# Patient Record
Sex: FEMALE | Race: WHITE | NOT HISPANIC OR LATINO | Employment: OTHER | URBAN - METROPOLITAN AREA
[De-identification: names, ages, dates, MRNs, and addresses within clinical notes are randomized per-mention and may not be internally consistent; named-entity substitution may affect disease eponyms.]

---

## 2017-01-31 ENCOUNTER — LAB (OUTPATIENT)
Dept: LAB | Facility: HOSPITAL | Age: 72
End: 2017-01-31

## 2017-01-31 ENCOUNTER — OFFICE VISIT (OUTPATIENT)
Dept: CARDIOLOGY | Facility: CLINIC | Age: 72
End: 2017-01-31

## 2017-01-31 VITALS
WEIGHT: 293 LBS | SYSTOLIC BLOOD PRESSURE: 132 MMHG | HEART RATE: 101 BPM | DIASTOLIC BLOOD PRESSURE: 90 MMHG | BODY MASS INDEX: 48.82 KG/M2 | HEIGHT: 65 IN

## 2017-01-31 DIAGNOSIS — I48.19 PERSISTENT ATRIAL FIBRILLATION (HCC): Primary | ICD-10-CM

## 2017-01-31 DIAGNOSIS — I48.19 PERSISTENT ATRIAL FIBRILLATION (HCC): ICD-10-CM

## 2017-01-31 LAB
ANION GAP SERPL CALCULATED.3IONS-SCNC: 16.2 MMOL/L
BUN BLD-MCNC: 17 MG/DL (ref 8–23)
BUN/CREAT SERPL: 17.2 (ref 7–25)
CALCIUM SPEC-SCNC: 9 MG/DL (ref 8.6–10.5)
CHLORIDE SERPL-SCNC: 101 MMOL/L (ref 98–107)
CO2 SERPL-SCNC: 23.8 MMOL/L (ref 22–29)
CREAT BLD-MCNC: 0.99 MG/DL (ref 0.57–1)
DEPRECATED RDW RBC AUTO: 47.7 FL (ref 37–54)
ERYTHROCYTE [DISTWIDTH] IN BLOOD BY AUTOMATED COUNT: 14.1 % (ref 11.7–13)
GFR SERPL CREATININE-BSD FRML MDRD: 55 ML/MIN/1.73
GLUCOSE BLD-MCNC: 87 MG/DL (ref 65–99)
HCT VFR BLD AUTO: 45.8 % (ref 35.6–45.5)
HGB BLD-MCNC: 14.6 G/DL (ref 11.9–15.5)
MCH RBC QN AUTO: 30 PG (ref 26.9–32)
MCHC RBC AUTO-ENTMCNC: 31.9 G/DL (ref 32.4–36.3)
MCV RBC AUTO: 94 FL (ref 80.5–98.2)
PLATELET # BLD AUTO: 270 10*3/MM3 (ref 140–500)
PMV BLD AUTO: 10.9 FL (ref 6–12)
POTASSIUM BLD-SCNC: 3.8 MMOL/L (ref 3.5–5.2)
RBC # BLD AUTO: 4.87 10*6/MM3 (ref 3.9–5.2)
SODIUM BLD-SCNC: 141 MMOL/L (ref 136–145)
WBC NRBC COR # BLD: 10.67 10*3/MM3 (ref 4.5–10.7)

## 2017-01-31 PROCEDURE — 36415 COLL VENOUS BLD VENIPUNCTURE: CPT

## 2017-01-31 PROCEDURE — 99214 OFFICE O/P EST MOD 30 MIN: CPT | Performed by: INTERNAL MEDICINE

## 2017-01-31 PROCEDURE — 80048 BASIC METABOLIC PNL TOTAL CA: CPT

## 2017-01-31 PROCEDURE — 93000 ELECTROCARDIOGRAM COMPLETE: CPT | Performed by: INTERNAL MEDICINE

## 2017-01-31 PROCEDURE — 85027 COMPLETE CBC AUTOMATED: CPT

## 2017-01-31 NOTE — PROGRESS NOTES
"Date of Office Visit: 2017  Encounter Provider: Moose Salcedo MD  Place of Service: [facility] CARDIOLOGY  Patient Name: Unique Talavera  : 1945    Subjective:   Encounter Date:2017    Chief Complaint: shortness of breath  History of Present Illness    Unique Talavera is a 71 y.o. female is a patient seen in follow up for heart failure. She presented initially with decompensated CHF and AF. She has been treated for systolic CHF (last EF 35%) with high dose BBs, she failed to tolerate ACE inhibitors with symptomatic hypotension. She feels that she is swelling more, but her dyspnea is unchanged. She is not checking regular weights. Unique Talavera denies CP, PND, and orthopnea.    Review of Systems: Per HPI otherwise 14 point ROS reviewed and negative.    Family History   Problem Relation Age of Onset   • Heart disease Mother    • Stroke Father        Social History     Social History   • Marital status:      Spouse name: N/A   • Number of children: N/A   • Years of education: N/A     Occupational History   • Not on file.     Social History Main Topics   • Smoking status: Former Smoker   • Smokeless tobacco: Not on file   • Alcohol use Yes      Comment: occassionally    • Drug use: No   • Sexual activity: Not on file     Other Topics Concern   • Not on file     Social History Narrative       Objective:     Vitals:    17 1323   BP: 132/90   Pulse: 101   Weight: 295 lb (134 kg)   Height: 65\" (165.1 cm)       Physical Exam   Constitutional: She is oriented to person, place, and time. She appears well-developed and well-nourished.   Eyes: Conjunctivae are normal. Pupils are equal, round, and reactive to light.   Neck: Neck supple. No JVD present. No thyromegaly present.   Cardiovascular: Normal rate and normal heart sounds.  An irregularly irregular rhythm present.   No murmur heard.  Pulmonary/Chest: Effort normal and breath sounds normal.   Abdominal: Soft. " Bowel sounds are normal. She exhibits no distension. There is no tenderness.   Musculoskeletal: She exhibits no edema.   Neurological: She is alert and oriented to person, place, and time.   Skin: Skin is warm. No rash noted. No erythema.   Psychiatric: She has a normal mood and affect. Her behavior is normal. Judgment normal.   Vitals reviewed.        ECG 12 Lead  Date/Time: 1/31/2017 2:43 PM  Performed by: NORY ANAYA  Authorized by: NORY ANAYA   Interpreted by ED physician  Comparison: compared with previous ECG   Similar to previous ECG  Rhythm: atrial fibrillation          Assessment:      Diagnosis Plan   1. Persistent atrial fibrillation  Basic Metabolic Panel    CBC (No Diff)     Plan:     In summary Unique Talavera presents for atrial fibrillation that is permanent; her CHADS2-VASc risk factors are congestive heart failure, age >65 and female and she is on therapeutic anticoagulation with rivaroxaban. I am pursuing a rate control strategy with BB. Rate control is at goal. She appears to be close to euvolemic today. She is not checking daily weights and I went over this with her, edema is going to be unreliable with her habitus. I am checking renal function today. I have strongly recommended cardiac rehab as well as she is getting close to being bed-bound.    Thank you for allowing me to participate in the care of Unique Talavera. I plan to see her back in follow up in 6 months. Feel free to contact me directly with any further questions or concerns.

## 2017-01-31 NOTE — LETTER
2017     Elvis Diaz MD  [address]  [facility] [address]    Patient: Unique Talavera   YOB: 1945   Date of Visit: 2017       Dear Dr. Emily MD:    Thank you for referring Unique Talavera to me for evaluation. Below are the relevant portions of my assessment and plan of care.    If you have questions, please do not hesitate to call me. I look forward to following Unique along with you.         Sincerely,        Moose Salcedo MD        CC: No Recipients  Moose Salcedo MD  2017  2:47 PM  Signed  Date of Office Visit: 2017  Encounter Provider: Moose Salcedo MD  Place of Service: [facility] CARDIOLOGY  Patient Name: Unique Talavera  : 1945    Subjective:   Encounter Date:2017    Chief Complaint: shortness of breath  History of Present Illness    Unique Talavera is a 71 y.o. female is a patient seen in follow up for heart failure. She presented initially with decompensated CHF and AF. She has been treated for systolic CHF (last EF 35%) with high dose BBs, she failed to tolerate ACE inhibitors with symptomatic hypotension. She feels that she is swelling more, but her dyspnea is unchanged. She is not checking regular weights. Unique Talavera denies CP, PND, and orthopnea.    Review of Systems: Per HPI otherwise 14 point ROS reviewed and negative.    Family History   Problem Relation Age of Onset   • Heart disease Mother    • Stroke Father        Social History     Social History   • Marital status:      Spouse name: N/A   • Number of children: N/A   • Years of education: N/A     Occupational History   • Not on file.     Social History Main Topics   • Smoking status: Former Smoker   • Smokeless tobacco: Not on file   • Alcohol use Yes      Comment: occassionally    • Drug use: No   • Sexual activity: Not on file     Other Topics Concern   • Not on file     Social History Narrative       Objective:     Vitals:    "01/31/17 1323   BP: 132/90   Pulse: 101   Weight: 295 lb (134 kg)   Height: 65\" (165.1 cm)       Physical Exam   Constitutional: She is oriented to person, place, and time. She appears well-developed and well-nourished.   Eyes: Conjunctivae are normal. Pupils are equal, round, and reactive to light.   Neck: Neck supple. No JVD present. No thyromegaly present.   Cardiovascular: Normal rate and normal heart sounds.  An irregularly irregular rhythm present.   No murmur heard.  Pulmonary/Chest: Effort normal and breath sounds normal.   Abdominal: Soft. Bowel sounds are normal. She exhibits no distension. There is no tenderness.   Musculoskeletal: She exhibits no edema.   Neurological: She is alert and oriented to person, place, and time.   Skin: Skin is warm. No rash noted. No erythema.   Psychiatric: She has a normal mood and affect. Her behavior is normal. Judgment normal.   Vitals reviewed.        ECG 12 Lead  Date/Time: 1/31/2017 2:43 PM  Performed by: NORY ANAYA  Authorized by: NORY ANAYA   Interpreted by ED physician  Comparison: compared with previous ECG   Similar to previous ECG  Rhythm: atrial fibrillation          Assessment:      Diagnosis Plan   1. Persistent atrial fibrillation  Basic Metabolic Panel    CBC (No Diff)     Plan:     In summary Unique Talavera presents for atrial fibrillation that is permanent; her CHADS2-VASc risk factors are congestive heart failure, age >65 and female and she is on therapeutic anticoagulation with rivaroxaban. I am pursuing a rate control strategy with BB. Rate control is at goal. She appears to be close to euvolemic today. She is not checking daily weights and I went over this with her, edema is going to be unreliable with her habitus. I am checking renal function today. I have strongly recommended cardiac rehab as well as she is getting close to being bed-bound.    Thank you for allowing me to participate in the care of Unique Talavera. I plan to see her back in " follow up in 6 months. Feel free to contact me directly with any further questions or concerns.

## 2017-01-31 NOTE — MR AVS SNAPSHOT
Unique Talavera   1/31/2017 1:20 PM   Office Visit    Dept Phone:  [phone]   Encounter #:  [id number]    Provider:  Moose Salcedo MD   Department:  [facility] CARDIOLOGY                Your Full Care Plan              Your Updated Medication List          This list is accurate as of: 1/31/17  2:22 PM.  Always use your most recent med list.                * furosemide 40 MG tablet   Commonly known as:  LASIX   Take 1 tablet by mouth Daily. Take twice daily on Monday, Wednesday and Friday       * furosemide 40 MG tablet   Commonly known as:  LASIX   TAKE ONE TABLET BY MOUTH DAILY       metoprolol succinate  MG 24 hr tablet   Commonly known as:  TOPROL-XL   Take 1 tablet by mouth 2 (two) times a day.       rivaroxaban 20 MG tablet   Commonly known as:  XARELTO   Take 1 tablet by mouth daily with dinner.       * Notice:  This list has 2 medication(s) that are the same as other medications prescribed for you. Read the directions carefully, and ask your doctor or other care provider to review them with you.            You Were Diagnosed With        Codes Comments    Persistent atrial fibrillation    -  Primary ICD-10-CM: I48.1  ICD-9-CM: 427.31       Instructions     None    Patient Instructions History      Upcoming Appointments     Visit Type Date Time Department    FOLLOW UP 1/31/2017  1:20 PM MARY STEWART [place]      [employer] Signup     Our records indicate that you have an active [employer] account.    You can view your After Visit Summary by going to [employer] and logging in with your [employer] username and password.  If you don't have a [employer] username and password but a parent or guardian has access to your record, the parent or guardian should login with their own [employer] username and password and access your record to view the After Visit Summary.    If you have questions, you can email [email or web address] or call  "[phone] to talk to our MyChart staff.  Remember, MyChart is NOT to be used for urgent needs.  For medical emergencies, dial 911.               Other Info from Your Visit           Allergies     No Known Allergies      Reason for Visit     Atrial Fibrillation 6 mnth follow up      Vital Signs     Blood Pressure Pulse Height Weight Body Mass Index Smoking Status    132/90 101 65\" (165.1 cm) 295 lb (134 kg) 49.09 kg/m2 Former Smoker      Problems and Diagnoses Noted     Atrial fibrillation (irregular heartbeat)        "

## 2017-02-02 ENCOUNTER — TELEPHONE (OUTPATIENT)
Dept: CARDIOLOGY | Facility: CLINIC | Age: 72
End: 2017-02-02

## 2017-02-02 NOTE — TELEPHONE ENCOUNTER
Pt called because she would like you to call her and go over her Echo results with her again.........Dona

## 2017-02-06 NOTE — TELEPHONE ENCOUNTER
I called pt but she has a lot of questions as to why her EF went from 15% to 35% that she would like to discuss. Also she had a BMP and CBC should would like the results of. Can you please call and speak with pt. Thanks......Dona

## 2017-02-06 NOTE — TELEPHONE ENCOUNTER
Her echo showed an ef of 35% but that was in august   She needs appt to see me this spring and i will reassess her

## 2017-02-06 NOTE — TELEPHONE ENCOUNTER
DEBI PT------ DEBI did not call pt yet to go over Echo. He did it at her appt the other day but she did not write it down........Dona

## 2017-02-07 DIAGNOSIS — I48.19 PERSISTENT ATRIAL FIBRILLATION (HCC): ICD-10-CM

## 2017-02-07 DIAGNOSIS — I50.41 ACUTE COMBINED SYSTOLIC AND DIASTOLIC HEART FAILURE (HCC): Primary | ICD-10-CM

## 2017-02-07 NOTE — TELEPHONE ENCOUNTER
Pt needs to be set up to see Kayla BUENO. She has a lot of questions for Conrad and he is booked till may. Can please get this scheduled. Thanks.....Dona

## 2017-02-07 NOTE — TELEPHONE ENCOUNTER
There's only on echo in the chart  Just get her an appt in the late spring w me or earlier w KV

## 2017-02-07 NOTE — TELEPHONE ENCOUNTER
PT has been scheduled for 8/4/17 pt wants to know if she will be having another before the 6month f/u? Due to the past echo showed a 35% increase from the 15% from the echo before.    Ricki

## 2017-02-07 NOTE — TELEPHONE ENCOUNTER
Called Pt and she stated that she does not need an appointment until July for her 6mth follow up. She would just like to have a phone call to have some questions answered.    Ricki

## 2017-02-16 ENCOUNTER — TELEPHONE (OUTPATIENT)
Dept: CARDIOLOGY | Facility: CLINIC | Age: 72
End: 2017-02-16

## 2017-02-16 NOTE — TELEPHONE ENCOUNTER
Pt is scheduled to have dental implant surgery Thursday 2/23. She takes xarelto and needs to know if she should stop it and if so how many days prior...Susan

## 2017-05-22 RX ORDER — FUROSEMIDE 40 MG/1
TABLET ORAL
Qty: 30 TABLET | Refills: 3 | Status: SHIPPED | OUTPATIENT
Start: 2017-05-22 | End: 2017-09-17 | Stop reason: SDUPTHER

## 2017-06-12 RX ORDER — RIVAROXABAN 20 MG/1
TABLET, FILM COATED ORAL
Qty: 30 TABLET | Refills: 10 | Status: SHIPPED | OUTPATIENT
Start: 2017-06-12 | End: 2017-06-12 | Stop reason: SDUPTHER

## 2017-08-04 ENCOUNTER — OFFICE VISIT (OUTPATIENT)
Dept: CARDIOLOGY | Facility: CLINIC | Age: 72
End: 2017-08-04

## 2017-08-04 ENCOUNTER — HOSPITAL ENCOUNTER (OUTPATIENT)
Dept: CARDIOLOGY | Facility: HOSPITAL | Age: 72
Discharge: HOME | End: 2017-08-04
Attending: INTERNAL MEDICINE | Admitting: INTERNAL MEDICINE

## 2017-08-04 ENCOUNTER — LAB (OUTPATIENT)
Dept: LAB | Facility: HOSPITAL | Age: 72
End: 2017-08-04

## 2017-08-04 ENCOUNTER — TELEPHONE (OUTPATIENT)
Dept: CARDIOLOGY | Facility: CLINIC | Age: 72
End: 2017-08-04

## 2017-08-04 VITALS
DIASTOLIC BLOOD PRESSURE: 88 MMHG | WEIGHT: 293 LBS | HEIGHT: 65 IN | SYSTOLIC BLOOD PRESSURE: 130 MMHG | BODY MASS INDEX: 48.82 KG/M2 | HEART RATE: 94 BPM

## 2017-08-04 VITALS
HEIGHT: 65 IN | SYSTOLIC BLOOD PRESSURE: 124 MMHG | WEIGHT: 293 LBS | DIASTOLIC BLOOD PRESSURE: 84 MMHG | HEART RATE: 84 BPM | BODY MASS INDEX: 48.82 KG/M2

## 2017-08-04 DIAGNOSIS — I42.8 NICM (NONISCHEMIC CARDIOMYOPATHY) (HCC): Primary | ICD-10-CM

## 2017-08-04 DIAGNOSIS — I50.41 ACUTE COMBINED SYSTOLIC AND DIASTOLIC HEART FAILURE (HCC): ICD-10-CM

## 2017-08-04 DIAGNOSIS — I48.19 PERSISTENT ATRIAL FIBRILLATION (HCC): ICD-10-CM

## 2017-08-04 DIAGNOSIS — E66.01 MORBID OBESITY, UNSPECIFIED OBESITY TYPE (HCC): ICD-10-CM

## 2017-08-04 DIAGNOSIS — I42.8 NICM (NONISCHEMIC CARDIOMYOPATHY) (HCC): ICD-10-CM

## 2017-08-04 LAB
ANION GAP SERPL CALCULATED.3IONS-SCNC: 14.2 MMOL/L
BH CV ECHO MEAS - ACS: 2.5 CM
BH CV ECHO MEAS - AI DEC SLOPE: 144.8 CM/SEC^2
BH CV ECHO MEAS - AI MAX PG: 39.1 MMHG
BH CV ECHO MEAS - AI MAX VEL: 312.7 CM/SEC
BH CV ECHO MEAS - AI P1/2T: 632.8 MSEC
BH CV ECHO MEAS - AO MAX PG (FULL): 7.4 MMHG
BH CV ECHO MEAS - AO MAX PG: 9.7 MMHG
BH CV ECHO MEAS - AO MEAN PG (FULL): 4 MMHG
BH CV ECHO MEAS - AO MEAN PG: 5.7 MMHG
BH CV ECHO MEAS - AO ROOT AREA (BSA CORRECTED): 1.3
BH CV ECHO MEAS - AO ROOT AREA: 6.8 CM^2
BH CV ECHO MEAS - AO ROOT DIAM: 2.9 CM
BH CV ECHO MEAS - AO V2 MAX: 156.1 CM/SEC
BH CV ECHO MEAS - AO V2 MEAN: 112.4 CM/SEC
BH CV ECHO MEAS - AO V2 VTI: 34.8 CM
BH CV ECHO MEAS - BSA(HAYCOCK): 2.6 M^2
BH CV ECHO MEAS - BSA: 2.3 M^2
BH CV ECHO MEAS - BZI_BMI: 49.1 KILOGRAMS/M^2
BH CV ECHO MEAS - BZI_METRIC_HEIGHT: 165.1 CM
BH CV ECHO MEAS - BZI_METRIC_WEIGHT: 133.8 KG
BH CV ECHO MEAS - CONTRAST EF (2CH): 46.8 ML/M^2
BH CV ECHO MEAS - CONTRAST EF 4CH: 36.9 ML/M^2
BH CV ECHO MEAS - EDV(MOD-SP2): 126 ML
BH CV ECHO MEAS - EDV(MOD-SP4): 103 ML
BH CV ECHO MEAS - EDV(TEICH): 146.5 ML
BH CV ECHO MEAS - EF(CUBED): 50.7 %
BH CV ECHO MEAS - EF(MOD-SP2): 46.8 %
BH CV ECHO MEAS - EF(MOD-SP4): 36.9 %
BH CV ECHO MEAS - EF(TEICH): 42.3 %
BH CV ECHO MEAS - ESV(MOD-SP2): 67 ML
BH CV ECHO MEAS - ESV(MOD-SP4): 65 ML
BH CV ECHO MEAS - ESV(TEICH): 84.6 ML
BH CV ECHO MEAS - FS: 21 %
BH CV ECHO MEAS - IVS/LVPW: 0.86
BH CV ECHO MEAS - IVSD: 0.83 CM
BH CV ECHO MEAS - LAT PEAK E' VEL: 10 CM/SEC
BH CV ECHO MEAS - LV DIASTOLIC VOL/BSA (35-75): 44.1 ML/M^2
BH CV ECHO MEAS - LV MASS(C)D: 184.7 GRAMS
BH CV ECHO MEAS - LV MASS(C)DI: 79.1 GRAMS/M^2
BH CV ECHO MEAS - LV MAX PG: 2.4 MMHG
BH CV ECHO MEAS - LV MEAN PG: 1.7 MMHG
BH CV ECHO MEAS - LV SYSTOLIC VOL/BSA (12-30): 27.9 ML/M^2
BH CV ECHO MEAS - LV V1 MAX: 77.1 CM/SEC
BH CV ECHO MEAS - LV V1 MEAN: 63.1 CM/SEC
BH CV ECHO MEAS - LV V1 VTI: 15.8 CM
BH CV ECHO MEAS - LVIDD: 5.5 CM
BH CV ECHO MEAS - LVIDS: 4.3 CM
BH CV ECHO MEAS - LVLD AP2: 7.1 CM
BH CV ECHO MEAS - LVLD AP4: 7.3 CM
BH CV ECHO MEAS - LVLS AP2: 6.2 CM
BH CV ECHO MEAS - LVLS AP4: 6.6 CM
BH CV ECHO MEAS - LVPWD: 0.97 CM
BH CV ECHO MEAS - MED PEAK E' VEL: 9 CM/SEC
BH CV ECHO MEAS - MR MAX PG: 73.5 MMHG
BH CV ECHO MEAS - MR MAX VEL: 428.6 CM/SEC
BH CV ECHO MEAS - MV DEC SLOPE: 572.5 CM/SEC^2
BH CV ECHO MEAS - MV DEC TIME: 0.17 SEC
BH CV ECHO MEAS - MV E MAX VEL: 96.7 CM/SEC
BH CV ECHO MEAS - MV MAX PG: 4.7 MMHG
BH CV ECHO MEAS - MV MEAN PG: 2.1 MMHG
BH CV ECHO MEAS - MV P1/2T MAX VEL: 99.4 CM/SEC
BH CV ECHO MEAS - MV P1/2T: 50.9 MSEC
BH CV ECHO MEAS - MV V2 MAX: 108.6 CM/SEC
BH CV ECHO MEAS - MV V2 MEAN: 66 CM/SEC
BH CV ECHO MEAS - MV V2 VTI: 21.4 CM
BH CV ECHO MEAS - MVA P1/2T LCG: 2.2 CM^2
BH CV ECHO MEAS - MVA(P1/2T): 4.3 CM^2
BH CV ECHO MEAS - PA ACC TIME: 0.11 SEC
BH CV ECHO MEAS - PA MAX PG (FULL): 2.9 MMHG
BH CV ECHO MEAS - PA MAX PG: 3.9 MMHG
BH CV ECHO MEAS - PA PR(ACCEL): 31.5 MMHG
BH CV ECHO MEAS - PA V2 MAX: 98.8 CM/SEC
BH CV ECHO MEAS - RAP SYSTOLE: 8 MMHG
BH CV ECHO MEAS - RV MAX PG: 1 MMHG
BH CV ECHO MEAS - RV MEAN PG: 0.43 MMHG
BH CV ECHO MEAS - RV V1 MAX: 50.4 CM/SEC
BH CV ECHO MEAS - RV V1 MEAN: 27.8 CM/SEC
BH CV ECHO MEAS - RV V1 VTI: 7.1 CM
BH CV ECHO MEAS - RVSP: 37 MMHG
BH CV ECHO MEAS - SI(AO): 102 ML/M^2
BH CV ECHO MEAS - SI(CUBED): 35.9 ML/M^2
BH CV ECHO MEAS - SI(MOD-SP2): 25.3 ML/M^2
BH CV ECHO MEAS - SI(MOD-SP4): 16.3 ML/M^2
BH CV ECHO MEAS - SI(TEICH): 26.5 ML/M^2
BH CV ECHO MEAS - SUP REN AO DIAM: 2.2 CM
BH CV ECHO MEAS - SV(AO): 238.1 ML
BH CV ECHO MEAS - SV(CUBED): 83.7 ML
BH CV ECHO MEAS - SV(MOD-SP2): 59 ML
BH CV ECHO MEAS - SV(MOD-SP4): 38 ML
BH CV ECHO MEAS - SV(TEICH): 61.9 ML
BH CV ECHO MEAS - TAPSE (>1.6): 1.4 CM2
BH CV ECHO MEAS - TR MAX VEL: 270.1 CM/SEC
BH CV XLRA - RV BASE: 3.6 CM
BH CV XLRA - TDI S': 9 CM/SEC
BUN BLD-MCNC: 21 MG/DL (ref 8–23)
BUN/CREAT SERPL: 17.8 (ref 7–25)
CALCIUM SPEC-SCNC: 9.8 MG/DL (ref 8.6–10.5)
CHLORIDE SERPL-SCNC: 103 MMOL/L (ref 98–107)
CO2 SERPL-SCNC: 24.8 MMOL/L (ref 22–29)
CREAT BLD-MCNC: 1.18 MG/DL (ref 0.57–1)
E/E' RATIO: 10
GFR SERPL CREATININE-BSD FRML MDRD: 45 ML/MIN/1.73
GLUCOSE BLD-MCNC: 97 MG/DL (ref 65–99)
LEFT ATRIUM VOLUME INDEX: 46 ML/M2
POTASSIUM BLD-SCNC: 4.4 MMOL/L (ref 3.5–5.2)
SODIUM BLD-SCNC: 142 MMOL/L (ref 136–145)

## 2017-08-04 PROCEDURE — 93000 ELECTROCARDIOGRAM COMPLETE: CPT | Performed by: INTERNAL MEDICINE

## 2017-08-04 PROCEDURE — C8929 TTE W OR WO FOL WCON,DOPPLER: HCPCS

## 2017-08-04 PROCEDURE — 99213 OFFICE O/P EST LOW 20 MIN: CPT | Performed by: INTERNAL MEDICINE

## 2017-08-04 PROCEDURE — 93306 TTE W/DOPPLER COMPLETE: CPT | Performed by: INTERNAL MEDICINE

## 2017-08-04 PROCEDURE — 25010000002 PERFLUTREN (DEFINITY) 8.476 MG IN SODIUM CHLORIDE 10 ML INJECTION: Performed by: INTERNAL MEDICINE

## 2017-08-04 PROCEDURE — 36415 COLL VENOUS BLD VENIPUNCTURE: CPT

## 2017-08-04 PROCEDURE — 80048 BASIC METABOLIC PNL TOTAL CA: CPT

## 2017-08-04 RX ADMIN — PERFLUTREN 1.5 ML: 6.52 INJECTION, SUSPENSION INTRAVENOUS at 09:49

## 2017-08-04 NOTE — PROGRESS NOTES
"Date of Office Visit: 2017  Encounter Provider: Lloyd Martines MD  Place of Service: [facility] CARDIOLOGY  Patient Name: Unique Talavera  : 1945    Subjective:     Encounter Date:2017      Patient ID: Unique Talavera is a 71 y.o. female who has a cc of perm AF and LV dysfunction and obesity.     She feels rotten. \"Holding rotten\"       No anginal chest pain, Fatigue -- no desire to do anything. Effort to exist.     Lives by herself.     Lots of  Castell[place] -- even in the house.   No soa,   No fainting,  No orthostasis.   Lots of edema.   Exercise tolerance: poor; walks w a cane-- weakness.     There have been no hospital admission since the last visit.     There have been no bleeding events.       Past Medical History:   Diagnosis Date   • Afib    • Arthritis    • Atrial fibrillation status post cardioversion 04/15/2016   • Cardiomyopathy    • Chronic systolic congestive heart failure    • Depression    • Hypertension    • Leukocytosis    • Obesity    • Vertigo        Social History     Social History   • Marital status:      Spouse name: N/A   • Number of children: N/A   • Years of education: N/A     Occupational History   • Not on file.     Social History Main Topics   • Smoking status: Former Smoker   • Smokeless tobacco: Not on file   • Alcohol use Yes      Comment: occassionally    • Drug use: No   • Sexual activity: Not on file     Other Topics Concern   • Not on file     Social History Narrative       Review of Systems   Constitution: Negative for fever and night sweats.   HENT: Negative for ear pain and stridor.    Eyes: Negative for discharge and visual halos.   Cardiovascular: Negative for cyanosis.   Respiratory: Negative for hemoptysis and sputum production.    Hematologic/Lymphatic: Negative for adenopathy.   Skin: Negative for nail changes and unusual hair distribution.   Musculoskeletal: Positive for joint pain and muscle weakness. Negative for gout and " "joint swelling.   Gastrointestinal: Negative for bowel incontinence and flatus.   Genitourinary: Negative for dysuria and flank pain.   Neurological: Negative for seizures and tremors.   Psychiatric/Behavioral: Negative for altered mental status. The patient is not nervous/anxious.             Objective:     Vitals:    08/04/17 1000   BP: 130/88   Pulse: 94   Weight: 295 lb (134 kg)   Height: 65\" (165.1 cm)         Physical Exam   Constitutional: She is oriented to person, place, and time.   HENT:   Head: Normocephalic and atraumatic.   Eyes: Right eye exhibits no discharge. Left eye exhibits no discharge.   Neck: No JVD present. No thyromegaly present.   Cardiovascular: Normal rate.  An irregularly irregular rhythm present. Exam reveals no gallop and no friction rub.    No murmur heard.  Pulmonary/Chest: Effort normal and breath sounds normal. She has no rales.   Abdominal: Soft. Bowel sounds are normal. There is no tenderness.   Musculoskeletal: Normal range of motion. She exhibits no edema or deformity.   Neurological: She is alert and oriented to person, place, and time. She exhibits normal muscle tone.   Skin: Skin is warm and dry. No erythema.   Psychiatric: She has a normal mood and affect. Her behavior is normal. Thought content normal.         ECG 12 Lead  Date/Time: 8/4/2017 10:20 AM  Performed by: VICENTE GREEN  Authorized by: VICENTE GREEN   Comparison: compared with previous ECG   Similar to previous ECG  Rhythm: atrial fibrillation  Clinical impression: abnormal ECG            Lab Review:       Assessment:          Diagnosis Plan   1. NICM (nonischemic cardiomyopathy)     2. Persistent atrial fibrillation     3. Morbid obesity, unspecified obesity type            Plan:       Her exam shows no edema or HF  Her ECG shows good rate control   Her echo shows EF 36% and certainly no worse and    We disc weight loss as this is the key to improve her chief complaint.     Will check labs                 "         I spent 20 minutes or more w pt and chart.

## 2017-08-04 NOTE — TELEPHONE ENCOUNTER
----- Message from Lloyd Martines MD sent at 8/4/2017  1:23 PM EDT -----  Please call the patient regarding her normal lab tests

## 2017-09-18 RX ORDER — FUROSEMIDE 40 MG/1
TABLET ORAL
Qty: 30 TABLET | Refills: 2 | Status: SHIPPED | OUTPATIENT
Start: 2017-09-18 | End: 2017-12-17 | Stop reason: SDUPTHER

## 2017-09-21 RX ORDER — FUROSEMIDE 40 MG/1
40 TABLET ORAL DAILY
Qty: 45 TABLET | Refills: 3 | Status: SHIPPED | OUTPATIENT
Start: 2017-09-21 | End: 2018-03-05

## 2017-09-22 DIAGNOSIS — I50.41 ACUTE COMBINED SYSTOLIC AND DIASTOLIC HEART FAILURE (HCC): ICD-10-CM

## 2017-09-22 DIAGNOSIS — E66.01 MORBID OBESITY, UNSPECIFIED OBESITY TYPE (HCC): ICD-10-CM

## 2017-09-22 RX ORDER — METOPROLOL SUCCINATE 100 MG/1
100 TABLET, EXTENDED RELEASE ORAL 2 TIMES DAILY
Qty: 180 TABLET | Refills: 3 | Status: SHIPPED | OUTPATIENT
Start: 2017-09-22 | End: 2018-09-17 | Stop reason: SDUPTHER

## 2017-12-18 RX ORDER — FUROSEMIDE 40 MG/1
TABLET ORAL
Qty: 30 TABLET | Refills: 1 | Status: SHIPPED | OUTPATIENT
Start: 2017-12-18 | End: 2018-02-14 | Stop reason: SDUPTHER

## 2018-02-14 RX ORDER — FUROSEMIDE 40 MG/1
TABLET ORAL
Qty: 30 TABLET | Refills: 0 | Status: SHIPPED | OUTPATIENT
Start: 2018-02-14 | End: 2018-03-05

## 2018-03-05 ENCOUNTER — OFFICE VISIT (OUTPATIENT)
Dept: CARDIOLOGY | Facility: CLINIC | Age: 73
End: 2018-03-05

## 2018-03-05 VITALS — SYSTOLIC BLOOD PRESSURE: 142 MMHG | DIASTOLIC BLOOD PRESSURE: 88 MMHG | HEART RATE: 83 BPM | HEIGHT: 65 IN

## 2018-03-05 DIAGNOSIS — I48.21 PERMANENT ATRIAL FIBRILLATION (HCC): ICD-10-CM

## 2018-03-05 DIAGNOSIS — I42.8 NICM (NONISCHEMIC CARDIOMYOPATHY) (HCC): Primary | ICD-10-CM

## 2018-03-05 DIAGNOSIS — R42 DIZZINESS: ICD-10-CM

## 2018-03-05 DIAGNOSIS — E66.01 MORBID OBESITY (HCC): ICD-10-CM

## 2018-03-05 PROCEDURE — 99214 OFFICE O/P EST MOD 30 MIN: CPT | Performed by: NURSE PRACTITIONER

## 2018-03-05 PROCEDURE — 93000 ELECTROCARDIOGRAM COMPLETE: CPT | Performed by: NURSE PRACTITIONER

## 2018-03-05 RX ORDER — FUROSEMIDE 40 MG/1
40 TABLET ORAL DAILY
COMMUNITY
End: 2018-11-26

## 2018-03-05 NOTE — PROGRESS NOTES
"Date of Office Visit: 2018  Encounter Provider: SADAF Quintero  Place of Service: [facility] CARDIOLOGY  Patient Name: Unique Talavera  :1945    Chief Complaint   Patient presents with   • Atrial Fibrillation   :     HPI: Unique Talavera is a 72 y.o. female who is a patient of Dr. Khan with a history of permanent AF, NICM, HTN and morbid obesity. She was last seen by him in 2017. She had an echo at that time which showed moderately decreased LV systolic function, EF 36.9%, moderately dilated LV with global hypokinesis, moderately dilated LA with mild MR and AI. She presents today for routine follow up.    She is accompanied by her son today. She says she feels okay. Unchanged from her last visit. She does not have chest pain, shortness of breath at rest, palpitations, PND, orthopnea or syncope. She says she has times that she feels \"disconnected.\" She complains of some intermittent dizziness with position changes.     She did not want to be weighed today. She says there is just not sense. She says she is not depressed but she just isn't going to fight anymore. She has fought her weight all of her life.       Past Medical History:   Diagnosis Date   • Afib    • Arthritis    • Atrial fibrillation status post cardioversion 04/15/2016   • Cardiomyopathy    • Chronic systolic congestive heart failure    • Depression    • Hypertension    • Leukocytosis    • Obesity    • Vertigo        Past Surgical History:   Procedure Laterality Date   • CARDIAC CATHETERIZATION N/A 2016    Procedure: Left Heart Cath;  Surgeon: Jostin Arce MD;  Location: [facility] CATH INVASIVE LOCATION;  Service:    • CARDIAC CATHETERIZATION N/A 2016    Procedure: Right Heart Cath;  Surgeon: Jostin Arce MD;  Location: [facility] CATH INVASIVE LOCATION;  Service:    •  SECTION     •  SECTION     • CORONARY ANGIOPLASTY     • KNEE SURGERY     • WRIST SURGERY   "       Social History     Social History   • Marital status:      Spouse name: N/A   • Number of children: N/A   • Years of education: N/A     Occupational History   • Not on file.     Social History Main Topics   • Smoking status: Former Smoker   • Smokeless tobacco: Not on file   • Alcohol use Yes      Comment: occassionally    • Drug use: No   • Sexual activity: Not on file     Other Topics Concern   • Not on file     Social History Narrative       Family History   Problem Relation Age of Onset   • Heart disease Mother    • Stroke Father        Review of Systems   Constitution: Positive for malaise/fatigue. Negative for chills, fever, weight gain and weight loss.   HENT: Negative for ear pain, hearing loss, nosebleeds and sore throat.    Eyes: Negative for double vision, pain and visual disturbance.   Cardiovascular: Negative for chest pain, dyspnea on exertion, irregular heartbeat, leg swelling, near-syncope, orthopnea, palpitations, paroxysmal nocturnal dyspnea and syncope.   Respiratory: Negative for cough, shortness of breath, sleep disturbances due to breathing, snoring and wheezing.    Endocrine: Negative for cold intolerance, heat intolerance and polyuria.   Hematologic/Lymphatic: Negative.    Skin: Negative for itching and rash.   Musculoskeletal: Negative for joint pain, joint swelling and myalgias.   Gastrointestinal: Negative for abdominal pain, diarrhea, melena, nausea and vomiting.   Genitourinary: Negative for frequency, hematuria and hesitancy.   Neurological: Positive for dizziness. Negative for excessive daytime sleepiness, headaches, light-headedness, numbness, paresthesias and seizures.   Psychiatric/Behavioral: Negative for altered mental status and depression.   Allergic/Immunologic: Negative.    All other systems reviewed and are negative.      No Known Allergies      Current Outpatient Prescriptions:   •  furosemide (LASIX) 40 MG tablet, Take 40 mg by mouth Daily., Disp: , Rfl:   •   "metoprolol succinate XL (TOPROL-XL) 100 MG 24 hr tablet, Take 1 tablet by mouth 2 (Two) Times a Day., Disp: 180 tablet, Rfl: 3  •  rivaroxaban (XARELTO) 20 MG tablet, Take 1 tablet by mouth Daily With Dinner., Disp: 30 tablet, Rfl: 2     Objective:     Vitals:    03/05/18 0846   BP: 142/88   Pulse: 83   Height: 165.1 cm (65\")     There is no height or weight on file to calculate BMI.    PHYSICAL EXAM:    Vitals Reviewed.   General Appearance: No acute distress, obese female.   Eyes: Conjunctiva and lids: No erythema, swelling, or discharge. Sclera non-icteric.   HENT: Atraumatic, normocephalic. External eyes, ears, and nose normal.   Respiratory: No signs of respiratory distress. Respiration rhythm and depth normal.   Clear to auscultation. No rales, crackles, rhonchi, or wheezing auscultated.   Cardiovascular:  Jugular Venous Pressure: Normal  Heart Rate and Rhythm: Irregularly, irregular.  Heart Sounds: Normal S1 and S2. No S3 or S4 noted.  Murmurs: No murmurs noted. No rubs, thrills, or gallops.   Arterial Pulses:  Posterior tibialis and dorsalis pedis pulses normal.   Lower Extremities: No edema noted.  Gastrointestinal:  Abdomen soft, non-distended, non-tender.   Musculoskeletal: Normal movement of extremities  Skin and Nails: General appearance normal. No pallor, cyanosis, diaphoresis. Skin temperature normal. No clubbing of fingernails.   Psychiatric: Patient alert and oriented to person, place, and time. Speech and behavior appropriate. Normal mood and affect.       ECG 12 Lead  Date/Time: 3/5/2018 8:58 AM  Performed by: SHANEKA NANCE  Authorized by: SHANEKA NANCE   Comparison: compared with previous ECG from 8/4/2017  Similar to previous ECG  Rhythm: atrial fibrillation  BPM: 83  Clinical impression: abnormal ECG              Assessment:       Diagnosis Plan   1. NICM (nonischemic cardiomyopathy)  ECG 12 Lead   2. Permanent atrial fibrillation  ECG 12 Lead   3. Morbid obesity     4. Dizziness          "   Plan:       1. NICM, stable. No HF by exam today.     2. Atrial Fibrillation and Atrial Flutter  Assessment  • The patient has permanent atrial fibrillation  • The patient's CHADS2-VASc score is 3  • A [id number] score of 2 or more is considered a high risk for a thromboembolic event  • Rivaroxaban prescribed  • Permanent AF, rate controlled. Continue BB and R-ban.    3. Morbid obesity. Discussed weight with her in detail. She requested not to be weighed today. Discussed the impact on her heart and overall health and that she could feel better. It's a marathon not a sprint.     4. Dizziness, unchanged, orthostatic related to position changes.    5. Hx of depression-she says she is not depressed. I told her I thought she was suffering from some depression. Recommended that she follow up with her PCP, she is going to think about it.     Return in 6 months to see Dr. Martines.     As always, it has been a pleasure to participate in your patient's care.      Sincerely,         SADAF Maravilla

## 2018-03-14 RX ORDER — FUROSEMIDE 40 MG/1
TABLET ORAL
Qty: 30 TABLET | Refills: 0 | Status: SHIPPED | OUTPATIENT
Start: 2018-03-14 | End: 2018-04-10 | Stop reason: SDUPTHER

## 2018-04-10 RX ORDER — FUROSEMIDE 40 MG/1
TABLET ORAL
Qty: 30 TABLET | Refills: 0 | Status: SHIPPED | OUTPATIENT
Start: 2018-04-10 | End: 2018-05-07 | Stop reason: SDUPTHER

## 2018-05-08 RX ORDER — FUROSEMIDE 40 MG/1
TABLET ORAL
Qty: 30 TABLET | Refills: 5 | Status: SHIPPED | OUTPATIENT
Start: 2018-05-08 | End: 2018-11-05 | Stop reason: SDUPTHER

## 2018-09-17 DIAGNOSIS — E66.01 MORBID OBESITY, UNSPECIFIED OBESITY TYPE (HCC): ICD-10-CM

## 2018-09-17 DIAGNOSIS — I50.41 ACUTE COMBINED SYSTOLIC AND DIASTOLIC HEART FAILURE (HCC): ICD-10-CM

## 2018-09-17 RX ORDER — METOPROLOL SUCCINATE 100 MG/1
TABLET, EXTENDED RELEASE ORAL
Qty: 180 TABLET | Refills: 0 | Status: SHIPPED | OUTPATIENT
Start: 2018-09-17 | End: 2018-12-14 | Stop reason: SDUPTHER

## 2018-10-15 ENCOUNTER — OFFICE VISIT (OUTPATIENT)
Dept: CARDIOLOGY | Facility: CLINIC | Age: 73
End: 2018-10-15

## 2018-10-15 VITALS
SYSTOLIC BLOOD PRESSURE: 142 MMHG | WEIGHT: 293 LBS | HEIGHT: 65 IN | DIASTOLIC BLOOD PRESSURE: 82 MMHG | HEART RATE: 97 BPM | BODY MASS INDEX: 48.82 KG/M2

## 2018-10-15 DIAGNOSIS — I48.21 PERMANENT ATRIAL FIBRILLATION (HCC): Primary | ICD-10-CM

## 2018-10-15 DIAGNOSIS — I42.8 NICM (NONISCHEMIC CARDIOMYOPATHY) (HCC): ICD-10-CM

## 2018-10-15 DIAGNOSIS — E66.01 MORBID OBESITY (HCC): ICD-10-CM

## 2018-10-15 PROCEDURE — 93000 ELECTROCARDIOGRAM COMPLETE: CPT | Performed by: INTERNAL MEDICINE

## 2018-10-15 PROCEDURE — 99213 OFFICE O/P EST LOW 20 MIN: CPT | Performed by: INTERNAL MEDICINE

## 2018-10-15 NOTE — PROGRESS NOTES
Date of Office Visit: 10/15/2018  Encounter Provider: Lloyd Martines MD  Place of Service: [facility] CARDIOLOGY  Patient Name: Unique Talavera  : 1945    Subjective:     Encounter Date:10/15/2018      Patient ID: Unique Talavera is a 73 y.o. female who has a cc of NICM and morbid obesity and persistent AF     Since the last 6 months, she reports no change in symptoms       No anginal chest pain,   Severe krishnan, walks with a cane.   No soa,   No fainting,  No orthostasis.   No edema.   Exercise tolerance: extremely poor     There have been no hospital admission since the last visit.     There have been no bleeding events.       Past Medical History:   Diagnosis Date   • Afib (CMS/[facility])    • Arthritis    • Atrial fibrillation status post cardioversion (CMS/[facility]) 04/15/2016   • Cardiomyopathy (CMS/HCC)    • Chronic systolic congestive heart failure (CMS/HCC)    • Depression    • Hypertension    • Leukocytosis    • Obesity    • Vertigo        Social History     Social History   • Marital status:      Spouse name: N/A   • Number of children: N/A   • Years of education: N/A     Occupational History   • Not on file.     Social History Main Topics   • Smoking status: Former Smoker   • Smokeless tobacco: Not on file   • Alcohol use Yes      Comment: occassionally    • Drug use: No   • Sexual activity: Not on file     Other Topics Concern   • Not on file     Social History Narrative   • No narrative on file       Review of Systems   Constitution: Negative for fever and night sweats.   HENT: Negative for ear pain and stridor.    Eyes: Negative for discharge and visual halos.   Cardiovascular: Negative for cyanosis.   Respiratory: Negative for hemoptysis and sputum production.    Hematologic/Lymphatic: Negative for adenopathy.   Skin: Negative for nail changes and unusual hair distribution.   Musculoskeletal: Positive for back pain, joint pain, myalgias and neck pain. Negative for gout and  "joint swelling.   Gastrointestinal: Negative for bowel incontinence and flatus.   Genitourinary: Negative for dysuria and flank pain.   Neurological: Negative for seizures and tremors.   Psychiatric/Behavioral: Negative for altered mental status. The patient is not nervous/anxious.             Objective:     Vitals:    10/15/18 0936   BP: 142/82   Pulse: 97   Weight: 134 kg (295 lb)   Height: 165.1 cm (65\")         Physical Exam   Constitutional: She is oriented to person, place, and time.   HENT:   Head: Normocephalic and atraumatic.   Eyes: Right eye exhibits no discharge. Left eye exhibits no discharge.   Neck: No JVD present. No thyromegaly present.   Cardiovascular: Normal rate.  An irregularly irregular rhythm present. Exam reveals no gallop and no friction rub.    No murmur heard.  Pulmonary/Chest: Effort normal and breath sounds normal. She has no rales.   Abdominal: Soft. Bowel sounds are normal. There is no tenderness.   Musculoskeletal: Normal range of motion. She exhibits no edema or deformity.   Neurological: She is alert and oriented to person, place, and time. She exhibits normal muscle tone.   Skin: Skin is warm and dry. No erythema.   Psychiatric: She has a normal mood and affect. Her behavior is normal. Thought content normal.         ECG 12 Lead  Date/Time: 10/15/2018 10:04 AM  Performed by: VICENTE GREEN  Authorized by: VICENTE GREEN   Comparison: compared with previous ECG   Rhythm: atrial fibrillation  Comments: NSST-T abn   LBBB aberration w AF   Single PVC             Lab Review:       Assessment:          Diagnosis Plan   1. Permanent atrial fibrillation (CMS/HCC)     2. NICM (nonischemic cardiomyopathy) (CMS/HCC)     3. Morbid obesity (CMS/HCC)            Plan:       Atrial Fibrillation and Atrial Flutter  Assessment  • The patient has permanent atrial fibrillation  • This is non-valvular in etiology  • The patient's CHADS2-VASc score is 3  • A [id number] score of 2 or more is " considered a high risk for a thromboembolic event    Plan  • Continue in atrial fibrillation with rate control  • Continue rivaroxaban for antithrombotic therapy, bleeding issues discussed  • Continue beta blocker for rate control    Obv the main problem -- is her morbid obesity  I was jamarcus about this   She needs to lose weight

## 2018-11-05 RX ORDER — FUROSEMIDE 40 MG/1
TABLET ORAL
Qty: 30 TABLET | Refills: 4 | Status: SHIPPED | OUTPATIENT
Start: 2018-11-05 | End: 2019-04-06 | Stop reason: SDUPTHER

## 2018-12-14 DIAGNOSIS — I50.41 ACUTE COMBINED SYSTOLIC AND DIASTOLIC HEART FAILURE (HCC): ICD-10-CM

## 2018-12-14 DIAGNOSIS — E66.01 MORBID OBESITY, UNSPECIFIED OBESITY TYPE (HCC): ICD-10-CM

## 2018-12-14 RX ORDER — METOPROLOL SUCCINATE 100 MG/1
TABLET, EXTENDED RELEASE ORAL
Qty: 180 TABLET | Refills: 0 | Status: SHIPPED | OUTPATIENT
Start: 2018-12-14 | End: 2019-03-13 | Stop reason: SDUPTHER

## 2019-03-13 DIAGNOSIS — I50.41 ACUTE COMBINED SYSTOLIC AND DIASTOLIC HEART FAILURE (HCC): ICD-10-CM

## 2019-03-13 DIAGNOSIS — E66.01 MORBID OBESITY, UNSPECIFIED OBESITY TYPE (HCC): ICD-10-CM

## 2019-03-13 RX ORDER — METOPROLOL SUCCINATE 100 MG/1
TABLET, EXTENDED RELEASE ORAL
Qty: 180 TABLET | Refills: 0 | Status: SHIPPED | OUTPATIENT
Start: 2019-03-13 | End: 2019-07-10 | Stop reason: SDUPTHER

## 2019-04-08 RX ORDER — FUROSEMIDE 40 MG/1
TABLET ORAL
Qty: 30 TABLET | Refills: 3 | Status: SHIPPED | OUTPATIENT
Start: 2019-04-08 | End: 2019-08-12 | Stop reason: SDUPTHER

## 2019-05-15 ENCOUNTER — OFFICE VISIT (OUTPATIENT)
Dept: CARDIOLOGY | Facility: CLINIC | Age: 74
End: 2019-05-15

## 2019-05-15 ENCOUNTER — HOSPITAL ENCOUNTER (OUTPATIENT)
Dept: CARDIOLOGY | Facility: HOSPITAL | Age: 74
Discharge: HOME | End: 2019-05-15
Admitting: NURSE PRACTITIONER

## 2019-05-15 VITALS
BODY MASS INDEX: 41.6 KG/M2 | DIASTOLIC BLOOD PRESSURE: 88 MMHG | HEART RATE: 95 BPM | SYSTOLIC BLOOD PRESSURE: 134 MMHG | HEIGHT: 65 IN

## 2019-05-15 DIAGNOSIS — I48.19 PERSISTENT ATRIAL FIBRILLATION (HCC): Primary | ICD-10-CM

## 2019-05-15 DIAGNOSIS — I42.8 NICM (NONISCHEMIC CARDIOMYOPATHY) (HCC): ICD-10-CM

## 2019-05-15 DIAGNOSIS — R40.0 DAYTIME SLEEPINESS: ICD-10-CM

## 2019-05-15 DIAGNOSIS — E66.01 MORBID OBESITY (HCC): ICD-10-CM

## 2019-05-15 DIAGNOSIS — I48.19 PERSISTENT ATRIAL FIBRILLATION (HCC): ICD-10-CM

## 2019-05-15 LAB
ALBUMIN SERPL-MCNC: 4.1 G/DL (ref 3.5–5.2)
ALBUMIN/GLOB SERPL: 1 G/DL
ALP SERPL-CCNC: 107 U/L (ref 39–117)
ALT SERPL W P-5'-P-CCNC: 16 U/L (ref 1–33)
ANION GAP SERPL CALCULATED.3IONS-SCNC: 12.1 MMOL/L
AST SERPL-CCNC: 17 U/L (ref 1–32)
BASOPHILS # BLD AUTO: 0.06 10*3/MM3 (ref 0–0.2)
BASOPHILS NFR BLD AUTO: 0.6 % (ref 0–1.5)
BILIRUB SERPL-MCNC: 0.5 MG/DL (ref 0.2–1.2)
BUN BLD-MCNC: 21 MG/DL (ref 8–23)
BUN/CREAT SERPL: 21.9 (ref 7–25)
CALCIUM SPEC-SCNC: 9.5 MG/DL (ref 8.6–10.5)
CHLORIDE SERPL-SCNC: 99 MMOL/L (ref 98–107)
CO2 SERPL-SCNC: 24.9 MMOL/L (ref 22–29)
CREAT BLD-MCNC: 0.96 MG/DL (ref 0.57–1)
DEPRECATED RDW RBC AUTO: 51.2 FL (ref 37–54)
EOSINOPHIL # BLD AUTO: 0.15 10*3/MM3 (ref 0–0.4)
EOSINOPHIL NFR BLD AUTO: 1.6 % (ref 0.3–6.2)
ERYTHROCYTE [DISTWIDTH] IN BLOOD BY AUTOMATED COUNT: 14.5 % (ref 12.3–15.4)
GFR SERPL CREATININE-BSD FRML MDRD: 57 ML/MIN/1.73
GLOBULIN UR ELPH-MCNC: 4 GM/DL
GLUCOSE BLD-MCNC: 104 MG/DL (ref 65–99)
HCT VFR BLD AUTO: 46.9 % (ref 34–46.6)
HGB BLD-MCNC: 14.2 G/DL (ref 12–15.9)
IMM GRANULOCYTES # BLD AUTO: 0.06 10*3/MM3 (ref 0–0.05)
IMM GRANULOCYTES NFR BLD AUTO: 0.6 % (ref 0–0.5)
LYMPHOCYTES # BLD AUTO: 1.91 10*3/MM3 (ref 0.7–3.1)
LYMPHOCYTES NFR BLD AUTO: 20 % (ref 19.6–45.3)
MCH RBC QN AUTO: 29 PG (ref 26.6–33)
MCHC RBC AUTO-ENTMCNC: 30.3 G/DL (ref 31.5–35.7)
MCV RBC AUTO: 95.7 FL (ref 79–97)
MONOCYTES # BLD AUTO: 0.66 10*3/MM3 (ref 0.1–0.9)
MONOCYTES NFR BLD AUTO: 6.9 % (ref 5–12)
NEUTROPHILS # BLD AUTO: 6.72 10*3/MM3 (ref 1.7–7)
NEUTROPHILS NFR BLD AUTO: 70.3 % (ref 42.7–76)
NRBC BLD AUTO-RTO: 0 /100 WBC (ref 0–0.2)
PLATELET # BLD AUTO: 248 10*3/MM3 (ref 140–450)
PMV BLD AUTO: 10.1 FL (ref 6–12)
POTASSIUM BLD-SCNC: 4.5 MMOL/L (ref 3.5–5.2)
PROT SERPL-MCNC: 8.1 G/DL (ref 6–8.5)
RBC # BLD AUTO: 4.9 10*6/MM3 (ref 3.77–5.28)
SODIUM BLD-SCNC: 136 MMOL/L (ref 136–145)
WBC NRBC COR # BLD: 9.56 10*3/MM3 (ref 3.4–10.8)

## 2019-05-15 PROCEDURE — 93000 ELECTROCARDIOGRAM COMPLETE: CPT | Performed by: NURSE PRACTITIONER

## 2019-05-15 PROCEDURE — 36415 COLL VENOUS BLD VENIPUNCTURE: CPT

## 2019-05-15 PROCEDURE — 80053 COMPREHEN METABOLIC PANEL: CPT | Performed by: NURSE PRACTITIONER

## 2019-05-15 PROCEDURE — 99214 OFFICE O/P EST MOD 30 MIN: CPT | Performed by: NURSE PRACTITIONER

## 2019-05-15 PROCEDURE — 85025 COMPLETE CBC W/AUTO DIFF WBC: CPT | Performed by: NURSE PRACTITIONER

## 2019-05-15 NOTE — PROGRESS NOTES
Date of Office Visit: 05/15/2019  Encounter Provider: SADAF Quintero  Place of Service: [facility] CARDIOLOGY  Patient Name: Unique Talavera  :1945    Chief Complaint   Patient presents with   • Atrial Fibrillation   :     HPI: Unique Talavera is a 73 y.o. female who is a patient of Dr. Khan (previously a patient of Dr. rome) with a history of nonischemic cardiomyopathy, permanent A. fib, hypertension and morbid obesity.  She presents today for routine follow-up.    Today she reports that she is doing okay.  She denies any chest pain, significant dyspnea, PND or orthopnea.  She does feel her A. fib whenever her heart rate gets up.  She said this really only happens whenever she occasionally forgets to take a dose of her metoprolol.  She also notes that she has dependent edema which of course improves whenever her feet are elevated.  She did have one episode of dizziness a week or so ago when she was working outside and bending over and standing up.  She she did fall that day but she went to the chiropractor and he adjusted her and she now feels better.  She did not have any other injuries that caused bruising or bleeding.    She is on rivaroxaban for anticoagulation and has not had any bleeding issues.    She is morbidly obese and has been for years, she refused to be weighed today.    The last lab work that I have available was from 2017 she does not think that she has had any drawn since that time as she really does not see her PCP very often.    She did ask about having a sleep study.  She said this was recommended several years ago but she did not want to do it but she has talked to some friends of hers who have had really good luck with CPAP and feel better so she would like to at least be evaluated.       Past Medical History:   Diagnosis Date   • Afib (CMS/[facility])    • Arthritis    • Atrial fibrillation status post cardioversion (CMS/[facility]) 04/15/2016   •  Cardiomyopathy (CMS/HCC)    • Chronic systolic congestive heart failure (CMS/HCC)    • Depression    • Dizziness    • Hypertension    • Leukocytosis    • NICM (nonischemic cardiomyopathy) (CMS/HCC)    • Obesity    • Permanent atrial fibrillation (CMS/HCC)    • Vertigo        Past Surgical History:   Procedure Laterality Date   • CARDIAC CATHETERIZATION N/A 2016    Procedure: Left Heart Cath;  Surgeon: Jostin Arce MD;  Location:  [facility];  Service:    • CARDIAC CATHETERIZATION N/A 2016    Procedure: Right Heart Cath;  Surgeon: Jostin Arce MD;  Location:  [facility];  Service:    •  SECTION     •  SECTION     • CORONARY ANGIOPLASTY     • KNEE SURGERY     • WRIST SURGERY         Social History     Socioeconomic History   • Marital status:      Spouse name: Not on file   • Number of children: Not on file   • Years of education: Not on file   • Highest education level: Not on file   Tobacco Use   • Smoking status: Former Smoker   Substance and Sexual Activity   • Alcohol use: Yes     Comment: occassionally    • Drug use: No       Family History   Problem Relation Age of Onset   • Heart disease Mother    • Stroke Father        Review of Systems   Constitution: Negative for chills, fever and malaise/fatigue.   Cardiovascular: Negative for chest pain, dyspnea on exertion, leg swelling, near-syncope, orthopnea, palpitations, paroxysmal nocturnal dyspnea and syncope.   Respiratory: Negative for cough and shortness of breath.    Hematologic/Lymphatic: Negative.    Musculoskeletal: Positive for arthritis and falls (once working outside and bending over-went to chiropractor-better now). Negative for joint pain, joint swelling and myalgias.   Gastrointestinal: Negative for abdominal pain, diarrhea, melena, nausea and vomiting.   Genitourinary: Negative for frequency and hematuria.   Neurological: Negative for light-headedness, numbness,  "paresthesias and seizures.   Allergic/Immunologic: Negative.    All other systems reviewed and are negative.      No Known Allergies      Current Outpatient Medications:   •  furosemide (LASIX) 40 MG tablet, TAKE ONE TABLET BY MOUTH DAILY, Disp: 30 tablet, Rfl: 3  •  metoprolol succinate XL (TOPROL-XL) 100 MG 24 hr tablet, TAKE ONE TABLET BY MOUTH TWICE A DAY, Disp: 180 tablet, Rfl: 0  •  rivaroxaban (XARELTO) 20 MG tablet, Take 1 tablet by mouth Daily With Dinner., Disp: 90 tablet, Rfl: 3      Objective:     Vitals:    05/15/19 0949   BP: 134/88   Pulse: 95   Height: 165.1 cm (65\")     Body mass index is 41.6 kg/m².    PHYSICAL EXAM:    Vitals Reviewed.   General Appearance: No acute distress, morbidly obese.  Eyes: Conjunctiva and lids: No erythema, swelling, or discharge. Sclera non-icteric.   HENT: Atraumatic, normocephalic. External eyes, ears, and nose normal.   Respiratory: No signs of respiratory distress. Respiration rhythm and depth normal.   Clear to auscultation. No rales, crackles, rhonchi, or wheezing auscultated.   Cardiovascular:  Heart Rate and Rhythm: Normal, Heart Sounds: Normal S1 and S2.   Murmurs: No murmurs noted. No rubs, thrills, or gallops.   Lower Extremities: Obese but no significant edema this morning.  Gastrointestinal:  Abdomen obese, soft, nontender  Musculoskeletal: Moves all extremities, ambulates with a cane.s  Skin: Warm and dry  Psychiatric: Patient alert and oriented to person, place, and time. Speech and behavior appropriate. Normal mood and affect.       ECG 12 Lead  Date/Time: 5/15/2019 9:57 AM  Performed by: Nazanin Paige APRN  Authorized by: Nazanin Paige APRN   Comparison: compared with previous ECG from 10/15/2018  Similar to previous ECG  Rhythm: atrial fibrillation  BPM: 95  QRS axis: normal    Clinical impression: abnormal EKG              Assessment:       Diagnosis Plan   1. Persistent atrial fibrillation (CMS/HCC)  Ambulatory Referral to Sleep Medicine    " Comprehensive Metabolic Panel    CBC & Differential    CBC Auto Differential   2. NICM (nonischemic cardiomyopathy) (CMS/[facility])  Comprehensive Metabolic Panel    CBC & Differential    CBC Auto Differential   3. Morbid obesity (CMS/HCC)  Ambulatory Referral to Sleep Medicine   4. Daytime sleepiness  Ambulatory Referral to Sleep Medicine          Plan:       1. Atrial Fibrillation and Atrial Flutter  Assessment  • The patient has permanent atrial fibrillation  • The patient's CHADS2-VASc score is 4  • A [id number] score of 2 or more is considered a high risk for a thromboembolic event  • Rivaroxaban prescribed  • Permanent A. fib, heart rate controlled.    She is on rivaroxaban for anticoagulation.  She has not had any labs for quite some time and is agreeable to have them done in the office today.  We will check a CMP and CBC and call her with results when available.    2. NICM, no heart failure by exam today.  Last echo was in August 2017, her EF was 36.9% at that time.  She is on a diuretic and metoprolol succinate.  She is not on an ACE inhibitor or ARB, I did discuss potentially adding a new medication with her history of cardiomyopathy however she does not want to do this.  She says as long as she is doing okay she really does not want to take another medication and risk having side effects.    3.  Morbidly obese.  Her son was with her today at her visit.  We discussed importance of weight loss not just for her heart but for her overall health.  She says she has tried every diet but she has not been successful.  She becomes very agitated and anxious.  I just tried to encourage her that even small changes would be beneficial.    4.  Daytime sleepiness.  She does say also when she had her cataract surgery recently that when she fell asleep they did note that her oxygen saturations dropped.  She has requested a referral to sleep medicine and I agree with this and have made a referral.    We will call her with labs  when available and have her follow-up with Dr. Martines in 6 months.    As always, it has been a pleasure to participate in your patient's care.      Sincerely,         SADAF Maravilla

## 2019-06-03 ENCOUNTER — APPOINTMENT (OUTPATIENT)
Dept: SLEEP MEDICINE | Facility: HOSPITAL | Age: 74
End: 2019-06-03

## 2019-07-10 DIAGNOSIS — E66.01 MORBID OBESITY, UNSPECIFIED OBESITY TYPE (HCC): ICD-10-CM

## 2019-07-10 DIAGNOSIS — I50.41 ACUTE COMBINED SYSTOLIC AND DIASTOLIC HEART FAILURE (HCC): ICD-10-CM

## 2019-07-10 RX ORDER — METOPROLOL SUCCINATE 100 MG/1
TABLET, EXTENDED RELEASE ORAL
Qty: 60 TABLET | Refills: 0 | Status: SHIPPED | OUTPATIENT
Start: 2019-07-10 | End: 2019-08-12 | Stop reason: SDUPTHER

## 2019-07-16 ENCOUNTER — TELEPHONE (OUTPATIENT)
Dept: CARDIOLOGY | Facility: CLINIC | Age: 74
End: 2019-07-16

## 2019-07-16 NOTE — TELEPHONE ENCOUNTER
Pt is scheduled for a foot surgery with Dr. Nannette Valenzuela at [facility]. She is wanting to know how long to hold xarelto prior to procedure. Pt has NO history of stroke or valve replacement...Susan

## 2019-08-12 DIAGNOSIS — I50.41 ACUTE COMBINED SYSTOLIC AND DIASTOLIC HEART FAILURE (HCC): ICD-10-CM

## 2019-08-12 DIAGNOSIS — E66.01 MORBID OBESITY, UNSPECIFIED OBESITY TYPE (HCC): ICD-10-CM

## 2019-08-12 RX ORDER — METOPROLOL SUCCINATE 100 MG/1
TABLET, EXTENDED RELEASE ORAL
Qty: 60 TABLET | Refills: 0 | Status: SHIPPED | OUTPATIENT
Start: 2019-08-12 | End: 2019-09-11 | Stop reason: SDUPTHER

## 2019-08-12 RX ORDER — FUROSEMIDE 40 MG/1
TABLET ORAL
Qty: 30 TABLET | Refills: 2 | Status: ON HOLD | OUTPATIENT
Start: 2019-08-12 | End: 2019-10-29 | Stop reason: SDUPTHER

## 2019-09-11 DIAGNOSIS — I50.41 ACUTE COMBINED SYSTOLIC AND DIASTOLIC HEART FAILURE (HCC): ICD-10-CM

## 2019-09-11 DIAGNOSIS — E66.01 MORBID OBESITY, UNSPECIFIED OBESITY TYPE (HCC): ICD-10-CM

## 2019-09-11 RX ORDER — METOPROLOL SUCCINATE 100 MG/1
TABLET, EXTENDED RELEASE ORAL
Qty: 60 TABLET | Refills: 0 | Status: SHIPPED | OUTPATIENT
Start: 2019-09-11 | End: 2019-10-12 | Stop reason: SDUPTHER

## 2019-10-12 DIAGNOSIS — E66.01 MORBID OBESITY, UNSPECIFIED OBESITY TYPE (HCC): ICD-10-CM

## 2019-10-12 DIAGNOSIS — I50.41 ACUTE COMBINED SYSTOLIC AND DIASTOLIC HEART FAILURE (HCC): ICD-10-CM

## 2019-10-14 RX ORDER — METOPROLOL SUCCINATE 100 MG/1
TABLET, EXTENDED RELEASE ORAL
Qty: 60 TABLET | Refills: 0 | Status: SHIPPED | OUTPATIENT
Start: 2019-10-14 | End: 2019-11-12 | Stop reason: SDUPTHER

## 2019-10-25 ENCOUNTER — APPOINTMENT (OUTPATIENT)
Dept: GENERAL RADIOLOGY | Facility: HOSPITAL | Age: 74
End: 2019-10-25

## 2019-10-25 ENCOUNTER — HOSPITAL ENCOUNTER (INPATIENT)
Facility: HOSPITAL | Age: 74
LOS: 4 days | Discharge: HOME | End: 2019-10-29
Attending: EMERGENCY MEDICINE | Admitting: INTERNAL MEDICINE

## 2019-10-25 ENCOUNTER — TELEPHONE (OUTPATIENT)
Dept: CARDIOLOGY | Facility: CLINIC | Age: 74
End: 2019-10-25

## 2019-10-25 DIAGNOSIS — R06.00 DYSPNEA, UNSPECIFIED TYPE: ICD-10-CM

## 2019-10-25 DIAGNOSIS — I48.91 ATRIAL FIBRILLATION, UNSPECIFIED TYPE (HCC): ICD-10-CM

## 2019-10-25 DIAGNOSIS — Z86.79 HISTORY OF ATRIAL FIBRILLATION: ICD-10-CM

## 2019-10-25 DIAGNOSIS — Z86.79 HISTORY OF CHF (CONGESTIVE HEART FAILURE): ICD-10-CM

## 2019-10-25 DIAGNOSIS — I50.9 ACUTE ON CHRONIC CONGESTIVE HEART FAILURE, UNSPECIFIED HEART FAILURE TYPE (HCC): Primary | ICD-10-CM

## 2019-10-25 LAB
ALBUMIN SERPL-MCNC: 4.1 G/DL (ref 3.5–5.2)
ALBUMIN/GLOB SERPL: 1.1 G/DL
ALP SERPL-CCNC: 110 U/L (ref 39–117)
ALT SERPL W P-5'-P-CCNC: 17 U/L (ref 1–33)
ANION GAP SERPL CALCULATED.3IONS-SCNC: 10.2 MMOL/L (ref 5–15)
AST SERPL-CCNC: 18 U/L (ref 1–32)
BASOPHILS # BLD AUTO: 0.06 10*3/MM3 (ref 0–0.2)
BASOPHILS NFR BLD AUTO: 0.5 % (ref 0–1.5)
BILIRUB SERPL-MCNC: 1.1 MG/DL (ref 0.2–1.2)
BUN BLD-MCNC: 16 MG/DL (ref 8–23)
BUN/CREAT SERPL: 19.3 (ref 7–25)
CALCIUM SPEC-SCNC: 8.9 MG/DL (ref 8.6–10.5)
CHLORIDE SERPL-SCNC: 103 MMOL/L (ref 98–107)
CO2 SERPL-SCNC: 25.8 MMOL/L (ref 22–29)
CREAT BLD-MCNC: 0.83 MG/DL (ref 0.57–1)
DEPRECATED RDW RBC AUTO: 42.1 FL (ref 37–54)
EOSINOPHIL # BLD AUTO: 0.11 10*3/MM3 (ref 0–0.4)
EOSINOPHIL NFR BLD AUTO: 0.9 % (ref 0.3–6.2)
ERYTHROCYTE [DISTWIDTH] IN BLOOD BY AUTOMATED COUNT: 13.3 % (ref 12.3–15.4)
GFR SERPL CREATININE-BSD FRML MDRD: 67 ML/MIN/1.73
GLOBULIN UR ELPH-MCNC: 3.8 GM/DL
GLUCOSE BLD-MCNC: 126 MG/DL (ref 65–99)
HCT VFR BLD AUTO: 39.4 % (ref 34–46.6)
HGB BLD-MCNC: 13 G/DL (ref 12–15.9)
IMM GRANULOCYTES # BLD AUTO: 0.06 10*3/MM3 (ref 0–0.05)
IMM GRANULOCYTES NFR BLD AUTO: 0.5 % (ref 0–0.5)
LYMPHOCYTES # BLD AUTO: 1.25 10*3/MM3 (ref 0.7–3.1)
LYMPHOCYTES NFR BLD AUTO: 10.6 % (ref 19.6–45.3)
MAGNESIUM SERPL-MCNC: 2 MG/DL (ref 1.6–2.4)
MCH RBC QN AUTO: 28.9 PG (ref 26.6–33)
MCHC RBC AUTO-ENTMCNC: 33 G/DL (ref 31.5–35.7)
MCV RBC AUTO: 87.6 FL (ref 79–97)
MONOCYTES # BLD AUTO: 0.84 10*3/MM3 (ref 0.1–0.9)
MONOCYTES NFR BLD AUTO: 7.1 % (ref 5–12)
NEUTROPHILS # BLD AUTO: 9.43 10*3/MM3 (ref 1.7–7)
NEUTROPHILS NFR BLD AUTO: 80.4 % (ref 42.7–76)
NRBC BLD AUTO-RTO: 0 /100 WBC (ref 0–0.2)
NT-PROBNP SERPL-MCNC: 2959 PG/ML (ref 5–1800)
PLATELET # BLD AUTO: 258 10*3/MM3 (ref 140–450)
PMV BLD AUTO: 10.4 FL (ref 6–12)
POTASSIUM BLD-SCNC: 3.7 MMOL/L (ref 3.5–5.2)
PROT SERPL-MCNC: 7.9 G/DL (ref 6–8.5)
RBC # BLD AUTO: 4.5 10*6/MM3 (ref 3.77–5.28)
SODIUM BLD-SCNC: 139 MMOL/L (ref 136–145)
TROPONIN T SERPL-MCNC: <0.01 NG/ML (ref 0–0.03)
WBC NRBC COR # BLD: 11.75 10*3/MM3 (ref 3.4–10.8)

## 2019-10-25 PROCEDURE — 93005 ELECTROCARDIOGRAM TRACING: CPT | Performed by: EMERGENCY MEDICINE

## 2019-10-25 PROCEDURE — 84484 ASSAY OF TROPONIN QUANT: CPT | Performed by: EMERGENCY MEDICINE

## 2019-10-25 PROCEDURE — 25010000002 FUROSEMIDE PER 20 MG: Performed by: NURSE PRACTITIONER

## 2019-10-25 PROCEDURE — 99284 EMERGENCY DEPT VISIT MOD MDM: CPT

## 2019-10-25 PROCEDURE — 93010 ELECTROCARDIOGRAM REPORT: CPT | Performed by: INTERNAL MEDICINE

## 2019-10-25 PROCEDURE — 80053 COMPREHEN METABOLIC PANEL: CPT | Performed by: EMERGENCY MEDICINE

## 2019-10-25 PROCEDURE — 99222 1ST HOSP IP/OBS MODERATE 55: CPT | Performed by: NURSE PRACTITIONER

## 2019-10-25 PROCEDURE — 83735 ASSAY OF MAGNESIUM: CPT | Performed by: EMERGENCY MEDICINE

## 2019-10-25 PROCEDURE — 71045 X-RAY EXAM CHEST 1 VIEW: CPT

## 2019-10-25 PROCEDURE — 83880 ASSAY OF NATRIURETIC PEPTIDE: CPT | Performed by: EMERGENCY MEDICINE

## 2019-10-25 PROCEDURE — 85025 COMPLETE CBC W/AUTO DIFF WBC: CPT | Performed by: EMERGENCY MEDICINE

## 2019-10-25 PROCEDURE — 25010000002 FUROSEMIDE PER 20 MG: Performed by: EMERGENCY MEDICINE

## 2019-10-25 RX ORDER — SODIUM CHLORIDE 0.9 % (FLUSH) 0.9 %
10 SYRINGE (ML) INJECTION AS NEEDED
Status: DISCONTINUED | OUTPATIENT
Start: 2019-10-25 | End: 2019-10-29 | Stop reason: HOSPADM

## 2019-10-25 RX ORDER — SODIUM CHLORIDE 0.9 % (FLUSH) 0.9 %
10 SYRINGE (ML) INJECTION EVERY 12 HOURS SCHEDULED
Status: DISCONTINUED | OUTPATIENT
Start: 2019-10-25 | End: 2019-10-29 | Stop reason: HOSPADM

## 2019-10-25 RX ORDER — METOPROLOL SUCCINATE 100 MG/1
100 TABLET, EXTENDED RELEASE ORAL 2 TIMES DAILY
Status: DISCONTINUED | OUTPATIENT
Start: 2019-10-25 | End: 2019-10-29 | Stop reason: HOSPADM

## 2019-10-25 RX ORDER — IBUPROFEN 800 MG/1
800 TABLET ORAL EVERY 4 HOURS PRN
Status: DISCONTINUED | OUTPATIENT
Start: 2019-10-25 | End: 2019-10-27

## 2019-10-25 RX ORDER — FUROSEMIDE 10 MG/ML
40 INJECTION INTRAMUSCULAR; INTRAVENOUS EVERY 12 HOURS
Status: DISCONTINUED | OUTPATIENT
Start: 2019-10-25 | End: 2019-10-25 | Stop reason: SDUPTHER

## 2019-10-25 RX ORDER — IBUPROFEN 200 MG
800 TABLET ORAL EVERY 4 HOURS PRN
COMMUNITY
End: 2019-10-29 | Stop reason: HOSPADM

## 2019-10-25 RX ORDER — FUROSEMIDE 10 MG/ML
80 INJECTION INTRAMUSCULAR; INTRAVENOUS ONCE
Status: COMPLETED | OUTPATIENT
Start: 2019-10-25 | End: 2019-10-25

## 2019-10-25 RX ORDER — FUROSEMIDE 10 MG/ML
40 INJECTION INTRAMUSCULAR; INTRAVENOUS EVERY 12 HOURS
Status: COMPLETED | OUTPATIENT
Start: 2019-10-25 | End: 2019-10-26

## 2019-10-25 RX ORDER — FUROSEMIDE 40 MG/1
40 TABLET ORAL DAILY
Status: DISCONTINUED | OUTPATIENT
Start: 2019-10-26 | End: 2019-10-25

## 2019-10-25 RX ORDER — METOPROLOL SUCCINATE 100 MG/1
100 TABLET, EXTENDED RELEASE ORAL EVERY 12 HOURS SCHEDULED
Status: DISCONTINUED | OUTPATIENT
Start: 2019-10-25 | End: 2019-10-25 | Stop reason: SDUPTHER

## 2019-10-25 RX ADMIN — FUROSEMIDE 80 MG: 10 INJECTION, SOLUTION INTRAMUSCULAR; INTRAVENOUS at 14:26

## 2019-10-25 RX ADMIN — RIVAROXABAN 20 MG: 20 TABLET, FILM COATED ORAL at 20:34

## 2019-10-25 RX ADMIN — SODIUM CHLORIDE, PRESERVATIVE FREE 10 ML: 5 INJECTION INTRAVENOUS at 20:35

## 2019-10-25 RX ADMIN — IBUPROFEN 800 MG: 800 TABLET, FILM COATED ORAL at 22:44

## 2019-10-25 RX ADMIN — METOPROLOL SUCCINATE 100 MG: 100 TABLET, FILM COATED, EXTENDED RELEASE ORAL at 20:33

## 2019-10-25 RX ADMIN — METOPROLOL TARTRATE 5 MG: 5 INJECTION, SOLUTION INTRAVENOUS at 13:26

## 2019-10-25 RX ADMIN — FUROSEMIDE 40 MG: 40 INJECTION, SOLUTION INTRAMUSCULAR; INTRAVENOUS at 20:44

## 2019-10-25 NOTE — TELEPHONE ENCOUNTER
"10/25/19  10:43 AM  Unique Talavera  1945  Home Phone [phone]       Unique Talavera is a patient of Dr Martines.  She is calling in this morning very SOA, fatigued, and just feeling bad.  She explains she has edema to her feet and ankles, but thinks it is from a recent surgery.  She also added that she has \"gained a lot of wt\" lately but doesn't know how much and thinks it could be related to inactivity.      Patient has a hx of CHF, I explained the importance of daily wt to her.  She said she is very SOA (it is audible) , can not tolerated any activity, and was wheezing through the night.  She adds that she slept in the recliner, and has not been sleeping well for the last 3 weeks.    Patient has a hx of afib- she said that her heart is not racing now, but she stated she had \"a fast heart rate for the last 3weeks.\"  But she doesn't know how fast, nor does she check her hr or bp at home.    Sending the patient to the ER for further evaluation of her symptoms  Thanks  Griselda Spaulding RN  Triage nurse      "

## 2019-10-26 PROBLEM — I50.23 ACUTE ON CHRONIC SYSTOLIC CONGESTIVE HEART FAILURE (HCC): Status: ACTIVE | Noted: 2019-10-25

## 2019-10-26 LAB
ANION GAP SERPL CALCULATED.3IONS-SCNC: 11.4 MMOL/L (ref 5–15)
BUN BLD-MCNC: 19 MG/DL (ref 8–23)
BUN/CREAT SERPL: 19.2 (ref 7–25)
CALCIUM SPEC-SCNC: 8.3 MG/DL (ref 8.6–10.5)
CHLORIDE SERPL-SCNC: 99 MMOL/L (ref 98–107)
CO2 SERPL-SCNC: 29.6 MMOL/L (ref 22–29)
CREAT BLD-MCNC: 0.99 MG/DL (ref 0.57–1)
DEPRECATED RDW RBC AUTO: 43.4 FL (ref 37–54)
ERYTHROCYTE [DISTWIDTH] IN BLOOD BY AUTOMATED COUNT: 13.5 % (ref 12.3–15.4)
GFR SERPL CREATININE-BSD FRML MDRD: 55 ML/MIN/1.73
GLUCOSE BLD-MCNC: 95 MG/DL (ref 65–99)
HCT VFR BLD AUTO: 36.9 % (ref 34–46.6)
HGB BLD-MCNC: 12 G/DL (ref 12–15.9)
MCH RBC QN AUTO: 28.8 PG (ref 26.6–33)
MCHC RBC AUTO-ENTMCNC: 32.5 G/DL (ref 31.5–35.7)
MCV RBC AUTO: 88.5 FL (ref 79–97)
PLATELET # BLD AUTO: 219 10*3/MM3 (ref 140–450)
PMV BLD AUTO: 10 FL (ref 6–12)
POTASSIUM BLD-SCNC: 3.3 MMOL/L (ref 3.5–5.2)
RBC # BLD AUTO: 4.17 10*6/MM3 (ref 3.77–5.28)
SODIUM BLD-SCNC: 140 MMOL/L (ref 136–145)
WBC NRBC COR # BLD: 10.79 10*3/MM3 (ref 3.4–10.8)

## 2019-10-26 PROCEDURE — 93010 ELECTROCARDIOGRAM REPORT: CPT | Performed by: INTERNAL MEDICINE

## 2019-10-26 PROCEDURE — 25010000002 FUROSEMIDE PER 20 MG: Performed by: NURSE PRACTITIONER

## 2019-10-26 PROCEDURE — 25010000002 FUROSEMIDE PER 20 MG: Performed by: INTERNAL MEDICINE

## 2019-10-26 PROCEDURE — 93005 ELECTROCARDIOGRAM TRACING: CPT | Performed by: INTERNAL MEDICINE

## 2019-10-26 PROCEDURE — 85027 COMPLETE CBC AUTOMATED: CPT | Performed by: INTERNAL MEDICINE

## 2019-10-26 PROCEDURE — 99232 SBSQ HOSP IP/OBS MODERATE 35: CPT | Performed by: INTERNAL MEDICINE

## 2019-10-26 PROCEDURE — 80048 BASIC METABOLIC PNL TOTAL CA: CPT | Performed by: INTERNAL MEDICINE

## 2019-10-26 RX ORDER — POTASSIUM CHLORIDE 750 MG/1
40 CAPSULE, EXTENDED RELEASE ORAL AS NEEDED
Status: DISCONTINUED | OUTPATIENT
Start: 2019-10-26 | End: 2019-10-29 | Stop reason: HOSPADM

## 2019-10-26 RX ORDER — POTASSIUM CHLORIDE 1.5 G/1.77G
40 POWDER, FOR SOLUTION ORAL AS NEEDED
Status: DISCONTINUED | OUTPATIENT
Start: 2019-10-26 | End: 2019-10-29 | Stop reason: HOSPADM

## 2019-10-26 RX ORDER — FUROSEMIDE 10 MG/ML
40 INJECTION INTRAMUSCULAR; INTRAVENOUS EVERY 12 HOURS
Status: COMPLETED | OUTPATIENT
Start: 2019-10-26 | End: 2019-10-27

## 2019-10-26 RX ORDER — POTASSIUM CHLORIDE 7.45 MG/ML
10 INJECTION INTRAVENOUS
Status: DISCONTINUED | OUTPATIENT
Start: 2019-10-26 | End: 2019-10-29 | Stop reason: HOSPADM

## 2019-10-26 RX ADMIN — SODIUM CHLORIDE, PRESERVATIVE FREE 10 ML: 5 INJECTION INTRAVENOUS at 20:34

## 2019-10-26 RX ADMIN — METOPROLOL SUCCINATE 100 MG: 100 TABLET, FILM COATED, EXTENDED RELEASE ORAL at 20:34

## 2019-10-26 RX ADMIN — POTASSIUM CHLORIDE 40 MEQ: 750 CAPSULE, EXTENDED RELEASE ORAL at 14:32

## 2019-10-26 RX ADMIN — RIVAROXABAN 20 MG: 20 TABLET, FILM COATED ORAL at 17:02

## 2019-10-26 RX ADMIN — FUROSEMIDE 40 MG: 40 INJECTION, SOLUTION INTRAMUSCULAR; INTRAVENOUS at 08:47

## 2019-10-26 RX ADMIN — SODIUM CHLORIDE, PRESERVATIVE FREE 10 ML: 5 INJECTION INTRAVENOUS at 08:48

## 2019-10-26 RX ADMIN — METOPROLOL SUCCINATE 100 MG: 100 TABLET, FILM COATED, EXTENDED RELEASE ORAL at 08:46

## 2019-10-26 RX ADMIN — FUROSEMIDE 40 MG: 40 INJECTION, SOLUTION INTRAMUSCULAR; INTRAVENOUS at 16:25

## 2019-10-27 PROBLEM — Z66 DNR (DO NOT RESUSCITATE): Status: ACTIVE | Noted: 2019-10-27

## 2019-10-27 LAB
ANION GAP SERPL CALCULATED.3IONS-SCNC: 10.8 MMOL/L (ref 5–15)
BUN BLD-MCNC: 27 MG/DL (ref 8–23)
BUN/CREAT SERPL: 24.8 (ref 7–25)
CALCIUM SPEC-SCNC: 8.2 MG/DL (ref 8.6–10.5)
CHLORIDE SERPL-SCNC: 101 MMOL/L (ref 98–107)
CO2 SERPL-SCNC: 29.2 MMOL/L (ref 22–29)
CREAT BLD-MCNC: 1.09 MG/DL (ref 0.57–1)
GFR SERPL CREATININE-BSD FRML MDRD: 49 ML/MIN/1.73
GLUCOSE BLD-MCNC: 98 MG/DL (ref 65–99)
POTASSIUM BLD-SCNC: 3.8 MMOL/L (ref 3.5–5.2)
SODIUM BLD-SCNC: 141 MMOL/L (ref 136–145)

## 2019-10-27 PROCEDURE — 97162 PT EVAL MOD COMPLEX 30 MIN: CPT

## 2019-10-27 PROCEDURE — 25010000002 FUROSEMIDE PER 20 MG: Performed by: INTERNAL MEDICINE

## 2019-10-27 PROCEDURE — 99232 SBSQ HOSP IP/OBS MODERATE 35: CPT | Performed by: INTERNAL MEDICINE

## 2019-10-27 PROCEDURE — 97110 THERAPEUTIC EXERCISES: CPT

## 2019-10-27 PROCEDURE — 80048 BASIC METABOLIC PNL TOTAL CA: CPT | Performed by: INTERNAL MEDICINE

## 2019-10-27 RX ORDER — ACETAMINOPHEN 325 MG/1
650 TABLET ORAL EVERY 6 HOURS PRN
Status: DISCONTINUED | OUTPATIENT
Start: 2019-10-27 | End: 2019-10-29 | Stop reason: HOSPADM

## 2019-10-27 RX ORDER — FUROSEMIDE 10 MG/ML
40 INJECTION INTRAMUSCULAR; INTRAVENOUS
Status: DISCONTINUED | OUTPATIENT
Start: 2019-10-27 | End: 2019-10-28

## 2019-10-27 RX ADMIN — FUROSEMIDE 40 MG: 40 INJECTION, SOLUTION INTRAMUSCULAR; INTRAVENOUS at 05:45

## 2019-10-27 RX ADMIN — SODIUM CHLORIDE, PRESERVATIVE FREE 10 ML: 5 INJECTION INTRAVENOUS at 08:41

## 2019-10-27 RX ADMIN — FUROSEMIDE 40 MG: 40 INJECTION, SOLUTION INTRAMUSCULAR; INTRAVENOUS at 17:48

## 2019-10-27 RX ADMIN — METOPROLOL SUCCINATE 100 MG: 100 TABLET, FILM COATED, EXTENDED RELEASE ORAL at 08:41

## 2019-10-27 RX ADMIN — METOPROLOL SUCCINATE 100 MG: 100 TABLET, FILM COATED, EXTENDED RELEASE ORAL at 20:47

## 2019-10-27 RX ADMIN — SODIUM CHLORIDE, PRESERVATIVE FREE 10 ML: 5 INJECTION INTRAVENOUS at 20:48

## 2019-10-27 RX ADMIN — RIVAROXABAN 20 MG: 20 TABLET, FILM COATED ORAL at 17:48

## 2019-10-28 LAB
ANION GAP SERPL CALCULATED.3IONS-SCNC: 12.5 MMOL/L (ref 5–15)
BUN BLD-MCNC: 29 MG/DL (ref 8–23)
BUN/CREAT SERPL: 28.4 (ref 7–25)
CALCIUM SPEC-SCNC: 8.6 MG/DL (ref 8.6–10.5)
CHLORIDE SERPL-SCNC: 97 MMOL/L (ref 98–107)
CO2 SERPL-SCNC: 30.5 MMOL/L (ref 22–29)
CREAT BLD-MCNC: 1.02 MG/DL (ref 0.57–1)
GFR SERPL CREATININE-BSD FRML MDRD: 53 ML/MIN/1.73
GLUCOSE BLD-MCNC: 98 MG/DL (ref 65–99)
MAGNESIUM SERPL-MCNC: 2.2 MG/DL (ref 1.6–2.4)
POTASSIUM BLD-SCNC: 3.9 MMOL/L (ref 3.5–5.2)
SODIUM BLD-SCNC: 140 MMOL/L (ref 136–145)

## 2019-10-28 PROCEDURE — 99232 SBSQ HOSP IP/OBS MODERATE 35: CPT | Performed by: NURSE PRACTITIONER

## 2019-10-28 PROCEDURE — 25010000002 FUROSEMIDE PER 20 MG: Performed by: INTERNAL MEDICINE

## 2019-10-28 PROCEDURE — 83735 ASSAY OF MAGNESIUM: CPT | Performed by: INTERNAL MEDICINE

## 2019-10-28 PROCEDURE — 80048 BASIC METABOLIC PNL TOTAL CA: CPT | Performed by: INTERNAL MEDICINE

## 2019-10-28 RX ORDER — FUROSEMIDE 40 MG/1
40 TABLET ORAL
Status: DISCONTINUED | OUTPATIENT
Start: 2019-10-28 | End: 2019-10-29 | Stop reason: HOSPADM

## 2019-10-28 RX ADMIN — FUROSEMIDE 40 MG: 40 INJECTION, SOLUTION INTRAMUSCULAR; INTRAVENOUS at 08:53

## 2019-10-28 RX ADMIN — METOPROLOL SUCCINATE 100 MG: 100 TABLET, FILM COATED, EXTENDED RELEASE ORAL at 20:11

## 2019-10-28 RX ADMIN — SODIUM CHLORIDE, PRESERVATIVE FREE 10 ML: 5 INJECTION INTRAVENOUS at 08:57

## 2019-10-28 RX ADMIN — METOPROLOL SUCCINATE 100 MG: 100 TABLET, FILM COATED, EXTENDED RELEASE ORAL at 08:53

## 2019-10-28 RX ADMIN — FUROSEMIDE 40 MG: 40 TABLET ORAL at 17:01

## 2019-10-28 RX ADMIN — RIVAROXABAN 20 MG: 20 TABLET, FILM COATED ORAL at 17:01

## 2019-10-28 RX ADMIN — SODIUM CHLORIDE, PRESERVATIVE FREE 10 ML: 5 INJECTION INTRAVENOUS at 20:12

## 2019-10-29 VITALS
DIASTOLIC BLOOD PRESSURE: 92 MMHG | HEIGHT: 65 IN | HEART RATE: 75 BPM | SYSTOLIC BLOOD PRESSURE: 134 MMHG | BODY MASS INDEX: 48.82 KG/M2 | TEMPERATURE: 97.6 F | WEIGHT: 293 LBS | RESPIRATION RATE: 18 BRPM | OXYGEN SATURATION: 90 %

## 2019-10-29 LAB
ANION GAP SERPL CALCULATED.3IONS-SCNC: 9.4 MMOL/L (ref 5–15)
BUN BLD-MCNC: 28 MG/DL (ref 8–23)
BUN/CREAT SERPL: 28 (ref 7–25)
CALCIUM SPEC-SCNC: 8.4 MG/DL (ref 8.6–10.5)
CHLORIDE SERPL-SCNC: 100 MMOL/L (ref 98–107)
CO2 SERPL-SCNC: 30.6 MMOL/L (ref 22–29)
CREAT BLD-MCNC: 1 MG/DL (ref 0.57–1)
GFR SERPL CREATININE-BSD FRML MDRD: 54 ML/MIN/1.73
GLUCOSE BLD-MCNC: 106 MG/DL (ref 65–99)
MAGNESIUM SERPL-MCNC: 2.6 MG/DL (ref 1.6–2.4)
POTASSIUM BLD-SCNC: 4 MMOL/L (ref 3.5–5.2)
SODIUM BLD-SCNC: 140 MMOL/L (ref 136–145)

## 2019-10-29 PROCEDURE — 99232 SBSQ HOSP IP/OBS MODERATE 35: CPT | Performed by: NURSE PRACTITIONER

## 2019-10-29 PROCEDURE — 83735 ASSAY OF MAGNESIUM: CPT | Performed by: INTERNAL MEDICINE

## 2019-10-29 PROCEDURE — 80048 BASIC METABOLIC PNL TOTAL CA: CPT | Performed by: INTERNAL MEDICINE

## 2019-10-29 RX ORDER — POTASSIUM CHLORIDE 1500 MG/1
20 TABLET, FILM COATED, EXTENDED RELEASE ORAL DAILY
Qty: 30 TABLET | Refills: 0 | Status: SHIPPED | OUTPATIENT
Start: 2019-10-29 | End: 2021-09-08 | Stop reason: SDUPTHER

## 2019-10-29 RX ORDER — FUROSEMIDE 40 MG/1
40 TABLET ORAL 2 TIMES DAILY
Qty: 60 TABLET | Refills: 1 | Status: SHIPPED | OUTPATIENT
Start: 2019-10-29 | End: 2020-02-17 | Stop reason: SDUPTHER

## 2019-10-29 RX ADMIN — METOPROLOL SUCCINATE 100 MG: 100 TABLET, FILM COATED, EXTENDED RELEASE ORAL at 09:34

## 2019-10-29 RX ADMIN — FUROSEMIDE 40 MG: 40 TABLET ORAL at 09:34

## 2019-10-29 RX ADMIN — SODIUM CHLORIDE, PRESERVATIVE FREE 10 ML: 5 INJECTION INTRAVENOUS at 09:35

## 2019-10-30 ENCOUNTER — READMISSION MANAGEMENT (OUTPATIENT)
Dept: CALL CENTER | Facility: HOSPITAL | Age: 74
End: 2019-10-30

## 2019-10-31 ENCOUNTER — READMISSION MANAGEMENT (OUTPATIENT)
Dept: CALL CENTER | Facility: HOSPITAL | Age: 74
End: 2019-10-31

## 2019-10-31 NOTE — OUTREACH NOTE
Prep Survey      Responses   Facility patient discharged from?  [facility]   Is patient eligible?  Yes   Discharge diagnosis  Acute on chronic systolic congestive heart failure    Does the patient have one of the following disease processes/diagnoses(primary or secondary)?  CHF   Does the patient have Home health ordered?  No   Is there a DME ordered?  No   Prep survey completed?  Yes          Amelia Pulido RN

## 2019-10-31 NOTE — OUTREACH NOTE
CHF Week 1 Survey      Responses   Facility patient discharged from?  [facility]   Does the patient have one of the following disease processes/diagnoses(primary or secondary)?  CHF   Is there a successful TCM telephone encounter documented?  No   CHF Week 1 attempt successful?  Yes   Call start time  1523   Call end time  1526   Discharge diagnosis  Acute on chronic systolic congestive heart failure    Meds reviewed with patient/caregiver?  Yes   Is the patient having any side effects they believe may be caused by any medication additions or changes?  No   Does the patient have all medications ordered at discharge?  Yes   Is the patient taking all medications as directed (includes completed medication regime)?  Yes   Does the patient have a primary care provider?   Yes   Does the patient have an appointment with their PCP within 7 days of discharge?  Yes   Comments regarding PCP  Dr Diaz/ Has an appt on 11/04/2019    Has the patient kept scheduled appointments due by today?  N/A   Has home health visited the patient within 72 hours of discharge?  N/A   Psychosocial issues?  No   Comments  Patient is doing well. She denies fluid retention or SOB.    Did the patient receive a copy of their discharge instructions?  Yes   Nursing interventions  Reviewed instructions with patient   What is the patient's perception of their health status since discharge?  Improving   Nursing interventions  Nurse provided patient education   Is the patient weighing daily?  No   Does the patient have scales?  Yes   Daily weight interventions  Education provided on importance of daily weight   Is the patient able to teach back Heart Failure diet management?  Yes   Is the patient able to teach back Heart Failure Zones?  Yes   Is the patient able to teach back signs and symptoms of worsening condition? (i.e. weight gain, shortness of air, etc.)  Yes   Is the patient/caregiver able to teach back the hierarchy of who to call/visit for  symptoms/problems? PCP, Specialist, Home health nurse, Urgent Care, ED, 911  Yes    CHF Week 1 call completed?  Yes          Sanchez Lopez RN

## 2019-11-07 ENCOUNTER — READMISSION MANAGEMENT (OUTPATIENT)
Dept: CALL CENTER | Facility: HOSPITAL | Age: 74
End: 2019-11-07

## 2019-11-07 NOTE — OUTREACH NOTE
CHF Week 2 Survey      Responses   Facility patient discharged from?  [place]   Does the patient have one of the following disease processes/diagnoses(primary or secondary)?  CHF   Week 2 attempt successful?  Yes   Call start time  1354   Revoke  Decline to participate   Call end time  1352          Rachel Calloway, RN

## 2019-11-12 DIAGNOSIS — E66.01 MORBID OBESITY, UNSPECIFIED OBESITY TYPE (HCC): ICD-10-CM

## 2019-11-12 DIAGNOSIS — I50.41 ACUTE COMBINED SYSTOLIC AND DIASTOLIC HEART FAILURE (HCC): ICD-10-CM

## 2019-11-12 RX ORDER — FUROSEMIDE 40 MG/1
TABLET ORAL
Qty: 30 TABLET | Refills: 1 | Status: SHIPPED | OUTPATIENT
Start: 2019-11-12 | End: 2020-01-17

## 2019-11-12 RX ORDER — METOPROLOL SUCCINATE 100 MG/1
TABLET, EXTENDED RELEASE ORAL
Qty: 60 TABLET | Refills: 0 | Status: SHIPPED | OUTPATIENT
Start: 2019-11-12 | End: 2019-12-16 | Stop reason: SDUPTHER

## 2019-12-04 ENCOUNTER — OFFICE VISIT (OUTPATIENT)
Dept: CARDIOLOGY | Facility: CLINIC | Age: 74
End: 2019-12-04

## 2019-12-04 VITALS
HEART RATE: 88 BPM | HEIGHT: 65 IN | BODY MASS INDEX: 48.82 KG/M2 | SYSTOLIC BLOOD PRESSURE: 126 MMHG | DIASTOLIC BLOOD PRESSURE: 82 MMHG | WEIGHT: 293 LBS

## 2019-12-04 DIAGNOSIS — I48.19 PERSISTENT ATRIAL FIBRILLATION (HCC): ICD-10-CM

## 2019-12-04 DIAGNOSIS — I50.23 ACUTE ON CHRONIC SYSTOLIC CONGESTIVE HEART FAILURE (HCC): ICD-10-CM

## 2019-12-04 DIAGNOSIS — E66.01 MORBID OBESITY (HCC): ICD-10-CM

## 2019-12-04 DIAGNOSIS — I42.8 NICM (NONISCHEMIC CARDIOMYOPATHY) (HCC): ICD-10-CM

## 2019-12-04 PROCEDURE — 99214 OFFICE O/P EST MOD 30 MIN: CPT | Performed by: INTERNAL MEDICINE

## 2019-12-04 PROCEDURE — 93000 ELECTROCARDIOGRAM COMPLETE: CPT | Performed by: INTERNAL MEDICINE

## 2019-12-04 NOTE — PROGRESS NOTES
Date of Office Visit: 2019  Encounter Provider: Lloyd Martines MD  Place of Service: [facility] CARDIOLOGY  Patient Name: Unique Talavera  : 1945    Subjective:     Encounter Date:2019      Patient ID: Unique Talavera is a 74 y.o. female who has a cc of  NICM and perm af and EF of 37% -- she was in the hosp with fluid excess in Oct.     She is doing better with her diet and salt intake and has lost weight    She feels better.     She is taking lots of NSAIDs      No anginal chest pain,   Moderat krishnan,   No soa,   No fainting,  No orthostasis.   No edema.   Exercise tolerance: limited --     There have been no hospital admission since the last visit.     There have been no bleeding events.       Past Medical History:   Diagnosis Date   • Afib (CMS/[facility])    • Arthritis    • Atrial fibrillation status post cardioversion (CMS/[facility]) 04/15/2016   • Cardiomyopathy (CMS/[facility])    • Chronic systolic congestive heart failure (CMS/[facility])    • Coronary artery disease    • Depression    • Dizziness    • Hypertension    • Leukocytosis    • NICM (nonischemic cardiomyopathy) (CMS/[facility])    • Obesity    • Permanent atrial fibrillation    • Vertigo        Social History     Socioeconomic History   • Marital status:      Spouse name: Not on file   • Number of children: Not on file   • Years of education: Not on file   • Highest education level: Not on file   Tobacco Use   • Smoking status: Former Smoker   Substance and Sexual Activity   • Alcohol use: Yes     Comment: occassionally    • Drug use: No       Review of Systems   Constitution: Negative for fever and night sweats.   HENT: Negative for ear pain and stridor.    Eyes: Negative for discharge and visual halos.   Cardiovascular: Negative for cyanosis.   Respiratory: Negative for hemoptysis and sputum production.    Hematologic/Lymphatic: Negative for adenopathy.   Skin: Negative for nail changes and unusual hair distribution.  "  Musculoskeletal: Positive for arthritis, back pain and joint pain. Negative for gout and joint swelling.   Gastrointestinal: Negative for bowel incontinence and flatus.   Genitourinary: Negative for dysuria and flank pain.   Neurological: Negative for seizures and tremors.   Psychiatric/Behavioral: Negative for altered mental status. The patient is not nervous/anxious.             Objective:     Vitals:    12/04/19 0847   BP: 126/82   BP Location: Right arm   Patient Position: Sitting   Cuff Size: Large Adult   Pulse: 88   Weight: (!) 140 kg (309 lb)   Height: 165.1 cm (65\")         Physical Exam   Constitutional: She is oriented to person, place, and time.   HENT:   Head: Normocephalic and atraumatic.   Eyes: Right eye exhibits no discharge. Left eye exhibits no discharge.   Neck: No JVD present. No thyromegaly present.   Cardiovascular: Normal rate. An irregularly irregular rhythm present. Exam reveals no gallop and no friction rub.   No murmur heard.  Pulmonary/Chest: Effort normal and breath sounds normal. She has no rales.   Abdominal: Soft. Bowel sounds are normal. There is no tenderness.   Musculoskeletal: Normal range of motion. She exhibits no edema or deformity.   Neurological: She is alert and oriented to person, place, and time. She exhibits normal muscle tone.   Skin: Skin is warm and dry. No erythema.   Psychiatric: She has a normal mood and affect. Her behavior is normal. Thought content normal.         ECG 12 Lead  Date/Time: 12/4/2019 9:41 AM  Performed by: Lloyd Martines MD  Authorized by: Lloyd Martines MD   Comparison: compared with previous ECG   Similar to previous ECG  Rhythm: atrial fibrillation    Clinical impression: abnormal EKG            Lab Review:       Assessment:          Diagnosis Plan   1. Persistent atrial fibrillation     2. NICM (nonischemic cardiomyopathy) (CMS/HCC)     3. Acute on chronic systolic congestive heart failure (CMS/HCC)     4. Morbid obesity (CMS/HCC)          "   Plan:       She is taking a ton of NSAIDs -- I rec stopping that NOW.    Her exam is ok -- no heart failure.     Obv she should be on an ACE or ARB but she would have to stop the NSAIDs.--    We have disc this before. She will call me in a few weeks to let me know if she could off the NSIADs and then we will plan to try the ACE-ARB     She will try the acetaminophen and see if she can get by.  The arthritis pain is her biggest problem.     For the AF -- she is rate controlled on BB and she is r-ban.

## 2019-12-16 DIAGNOSIS — I50.41 ACUTE COMBINED SYSTOLIC AND DIASTOLIC HEART FAILURE (HCC): ICD-10-CM

## 2019-12-16 DIAGNOSIS — E66.01 MORBID OBESITY, UNSPECIFIED OBESITY TYPE (HCC): ICD-10-CM

## 2019-12-16 RX ORDER — METOPROLOL SUCCINATE 100 MG/1
TABLET, EXTENDED RELEASE ORAL
Qty: 60 TABLET | Refills: 0 | Status: SHIPPED | OUTPATIENT
Start: 2019-12-16 | End: 2020-01-13

## 2020-01-13 DIAGNOSIS — I50.41 ACUTE COMBINED SYSTOLIC AND DIASTOLIC HEART FAILURE (HCC): ICD-10-CM

## 2020-01-13 DIAGNOSIS — E66.01 MORBID OBESITY, UNSPECIFIED OBESITY TYPE (HCC): ICD-10-CM

## 2020-01-13 RX ORDER — METOPROLOL SUCCINATE 100 MG/1
TABLET, EXTENDED RELEASE ORAL
Qty: 60 TABLET | Refills: 0 | Status: SHIPPED | OUTPATIENT
Start: 2020-01-13 | End: 2020-01-17 | Stop reason: SDUPTHER

## 2020-01-14 ENCOUNTER — TELEPHONE (OUTPATIENT)
Dept: CARDIOLOGY | Facility: CLINIC | Age: 75
End: 2020-01-14

## 2020-01-14 NOTE — TELEPHONE ENCOUNTER
I spoke to patient and she said she is not able to come in for a b/p check.  She said it is hard for her to get a ride.  I asked patient if she has a b/p cuff and she said no.

## 2020-01-14 NOTE — TELEPHONE ENCOUNTER
Ok -- I can' start her on ACE or ARB if she can't get labs or a BP.     Tell her this: it's good to be off the ibuprofen. Perhaps she could get to dr mckeon --who could try a low dose of lisonopril

## 2020-01-14 NOTE — TELEPHONE ENCOUNTER
Phone# [phone]    Patient called, informing me that she is now off of Ibuprofen.  Patient said she was supposed to call once she stopped Ibuprofen to discuss going on an ACE or ARB.  Patient has a lot of questions regarding why you want to put her on this medication and if it is necessary.  What medication would you like to prescribe for pt?  I can have a triage nurse call patient back if you would like.  Karen

## 2020-01-15 NOTE — TELEPHONE ENCOUNTER
"Spoke with patient and she isn't able to get to Dr. Campos's office either.  She asked if you could send the medication, as you asked her to call back when she was off the ibuprofen.  She also would like to know the reason that you want her on this medication, as she doesn't think that she has \"high blood pressure.\"    If you would like to discuss with patient she can be reached at [phone].    Thanks!    "

## 2020-01-17 DIAGNOSIS — E66.01 MORBID OBESITY, UNSPECIFIED OBESITY TYPE (HCC): ICD-10-CM

## 2020-01-17 DIAGNOSIS — I50.41 ACUTE COMBINED SYSTOLIC AND DIASTOLIC HEART FAILURE (HCC): ICD-10-CM

## 2020-01-17 RX ORDER — FUROSEMIDE 40 MG/1
40 TABLET ORAL DAILY
Qty: 30 TABLET | Refills: 0 | Status: SHIPPED | OUTPATIENT
Start: 2020-01-17 | End: 2020-02-17 | Stop reason: SDUPTHER

## 2020-01-17 RX ORDER — METOPROLOL SUCCINATE 100 MG/1
100 TABLET, EXTENDED RELEASE ORAL 2 TIMES DAILY
Qty: 60 TABLET | Refills: 0 | Status: SHIPPED | OUTPATIENT
Start: 2020-01-17 | End: 2020-01-28 | Stop reason: SDUPTHER

## 2020-01-17 RX ORDER — FUROSEMIDE 40 MG/1
TABLET ORAL
Qty: 30 TABLET | Refills: 0 | Status: SHIPPED | OUTPATIENT
Start: 2020-01-17 | End: 2020-01-17 | Stop reason: SDUPTHER

## 2020-01-23 ENCOUNTER — TELEPHONE (OUTPATIENT)
Dept: CARDIOLOGY | Facility: CLINIC | Age: 75
End: 2020-01-23

## 2020-01-23 NOTE — TELEPHONE ENCOUNTER
# [phone]    Patient left a voicemail that she needs a 90 day RX for Metoprolol 100mg and to please call her.  I called pt and left a voicemail for her to call back.  Karen

## 2020-01-27 NOTE — TELEPHONE ENCOUNTER
I tried to call patient and it sounded like someone picked up the phone, then we were disconnected.  I will try to call patient back tomorrow.  Karen

## 2020-01-28 DIAGNOSIS — I50.41 ACUTE COMBINED SYSTOLIC AND DIASTOLIC HEART FAILURE (HCC): ICD-10-CM

## 2020-01-28 DIAGNOSIS — E66.01 MORBID OBESITY, UNSPECIFIED OBESITY TYPE (HCC): ICD-10-CM

## 2020-01-28 RX ORDER — METOPROLOL SUCCINATE 100 MG/1
100 TABLET, EXTENDED RELEASE ORAL 2 TIMES DAILY
Qty: 180 TABLET | Refills: 1 | Status: SHIPPED | OUTPATIENT
Start: 2020-01-28 | End: 2020-07-13

## 2020-02-17 RX ORDER — FUROSEMIDE 40 MG/1
TABLET ORAL
Qty: 90 TABLET | Refills: 1 | Status: SHIPPED | OUTPATIENT
Start: 2020-02-17 | End: 2020-11-11

## 2020-06-10 ENCOUNTER — OFFICE VISIT (OUTPATIENT)
Dept: CARDIOLOGY | Facility: CLINIC | Age: 75
End: 2020-06-10

## 2020-06-10 VITALS — WEIGHT: 293 LBS | BODY MASS INDEX: 48.82 KG/M2 | HEIGHT: 65 IN

## 2020-06-10 DIAGNOSIS — E66.01 MORBID OBESITY (HCC): ICD-10-CM

## 2020-06-10 DIAGNOSIS — I48.19 PERSISTENT ATRIAL FIBRILLATION (HCC): ICD-10-CM

## 2020-06-10 DIAGNOSIS — I42.8 NICM (NONISCHEMIC CARDIOMYOPATHY) (HCC): Primary | ICD-10-CM

## 2020-06-10 PROCEDURE — 99442 PR PHYS/QHP TELEPHONE EVALUATION 11-20 MIN: CPT | Performed by: NURSE PRACTITIONER

## 2020-06-10 NOTE — PROGRESS NOTES
"Date of Office Visit: 06/10/2020  Encounter Provider: SADAF Quintero  Place of Service: [facility] CARDIOLOGY  Patient Name: Unique Talavera  :1945    Chief Complaint   Patient presents with   • Atrial Fibrillation   • Cardiomyopathy   • Follow-up   :     HPI: Unique Talavera is a 74 y.o. female who is a patient followed by Dr. Martines,  She has NICM, permanent A. Fib, HTN and morbid obesity.     She saw Dr. Martines in 2019.    She was scheduled to come into office today for a routine follow up visit but secondary to COVID 19 pandemic we are doing a telehealth visit per patient's preference.    This patient has consented to a telehealth visit via audio/telephone. The visit was scheduled as a telehealth telephone visit to comply with patient safety concerns in accordance with CDC recommendations.  All vitals recorded within this visit are reported by the patient.  I spent  13 minutes in total including but not limited to the 8 minutes spent in direct conversation with this patient.     Today she reports that she is doing about the same as she was when she came into the office in December.    She denies any chest pain or significant dyspnea.    She reports her weight is being the same as it was when she was here, have discussed with her about getting a blood pressure cuff in the past to check her blood pressure she has not done this.    She asked about whether she could take extra furosemide, we have discussed this in the past that if she felt like she had extra fluid that she could take an extra dose and when she does that she takes her potassium.  Otherwise she takes a \"natural substance\" that has potassium in it.    She still takes a fair amount of ibuprofen for her chronic back pain.  This is been discussed as well with her being on rivaroxaban and her cardiovascular history.    When I asked her why she wanted to know if she could take extra diuretic she says will " just sometimes she feels like she needs it she does not really admit to having edema or increasing shortness of breath or weight gain.  I again discussed the reasons and symptoms to watch for that would warrant taking extra diuretic.    She reports that she is compliant with her rivaroxaban and has not had any bleeding issues.    She says that she saw Dr. Campos not too long ago and he thought she was doing okay, he did not do any lab work.       Past Medical History:   Diagnosis Date   • Acute on chronic congestive heart failure (CMS/[facility])    • Afib (CMS/[facility])    • Arthritis    • Atrial fibrillation status post cardioversion (CMS/[facility]) 04/15/2016   • Cardiomyopathy (CMS/[facility])    • Chronic systolic congestive heart failure (CMS/[facility])    • Coronary artery disease    • Daytime sleepiness    • Depression    • Dizziness    • Hypertension    • Leukocytosis    • NICM (nonischemic cardiomyopathy) (CMS/[facility])    • Obesity     morbid   • Permanent atrial fibrillation (CMS/[facility])    • Persistent atrial fibrillation (CMS/[facility])    • Vertigo        Past Surgical History:   Procedure Laterality Date   • CARDIAC CATHETERIZATION N/A 2016    Procedure: Left Heart Cath;  Surgeon: Jostin Arce MD;  Location: [facility] INVASIVE LOCATION;  Service:    • CARDIAC CATHETERIZATION N/A 2016    Procedure: Right Heart Cath;  Surgeon: Jostin Arce MD;  Location: [facility] INVASIVE LOCATION;  Service:    •  SECTION     •  SECTION     • KNEE SURGERY     • WRIST SURGERY         Social History     Socioeconomic History   • Marital status:      Spouse name: Not on file   • Number of children: Not on file   • Years of education: Not on file   • Highest education level: Not on file   Tobacco Use   • Smoking status: Former Smoker   Substance and Sexual Activity   • Alcohol use: Yes     Comment: occassionally /caffeine use   • Drug use: No       Family History   Problem Relation Age of Onset   • Heart  "disease Mother    • Stroke Father        Review of Systems   Constitution: Negative for chills, fever and malaise/fatigue.   Cardiovascular: Negative for chest pain, dyspnea on exertion, leg swelling, near-syncope, orthopnea, palpitations, paroxysmal nocturnal dyspnea and syncope.   Respiratory: Negative for cough and shortness of breath.    Hematologic/Lymphatic: Negative.    Musculoskeletal: Negative for joint pain, joint swelling and myalgias.   Gastrointestinal: Negative for abdominal pain, diarrhea, melena, nausea and vomiting.   Genitourinary: Negative for frequency and hematuria.   Neurological: Negative for light-headedness, numbness, paresthesias and seizures.   Allergic/Immunologic: Negative.    All other systems reviewed and are negative.      No Known Allergies      Current Outpatient Medications:   •  furosemide (LASIX) 40 MG tablet, TAKE ONE TABLET BY MOUTH DAILY, Disp: 90 tablet, Rfl: 1  •  metoprolol succinate XL (TOPROL-XL) 100 MG 24 hr tablet, Take 1 tablet by mouth 2 (Two) Times a Day., Disp: 180 tablet, Rfl: 1  •  potassium chloride (K-TAB) 20 MEQ tablet controlled-release ER tablet, Take 1 tablet by mouth Daily., Disp: 30 tablet, Rfl: 0  •  rivaroxaban (XARELTO) 20 MG tablet, Take 1 tablet by mouth Daily With Dinner., Disp: 28 tablet, Rfl: 0      Objective:     Vitals:    06/10/20 0919   Weight: (!) 140 kg (309 lb)   Height: 165.1 cm (65\")     Body mass index is 51.42 kg/m².    PHYSICAL EXAM: Telehealth audio visit.     Vitals Reviewed---no b/p only weight   Respiratory: No signs of respiratory distress via audio visit.     Psychiatric: Patient alert and oriented to person, place, and time. Speech and behavior appropriate. Normal mood and affect.           Assessment:       Diagnosis Plan   1. NICM (nonischemic cardiomyopathy) (CMS/HCC)     2. Persistent atrial fibrillation (CMS/HCC)     3. Morbid obesity (CMS/HCC)            Plan:       1. NICM, she reports no change in her overall health since " when she was in the office in December.    2.  Persistent A. fib, she is on metoprolol and anticoagulation.  She does not report any palpitations or feelings of her heart rate, her heart rate has been controlled in the past although she does not measure this or her blood pressure at home.    3.  Morbid obesity, we have discussed the importance of weight loss and taking care of herself many times in the past.    Follow-up with us in the office in 6 months.    As always, it has been a pleasure to participate in your patient's care.      Sincerely,         SADAF Maravilla

## 2020-07-13 DIAGNOSIS — I50.41 ACUTE COMBINED SYSTOLIC AND DIASTOLIC HEART FAILURE (HCC): ICD-10-CM

## 2020-07-13 DIAGNOSIS — E66.01 MORBID OBESITY, UNSPECIFIED OBESITY TYPE (HCC): ICD-10-CM

## 2020-07-13 RX ORDER — METOPROLOL SUCCINATE 100 MG/1
TABLET, EXTENDED RELEASE ORAL
Qty: 180 TABLET | Refills: 0 | Status: SHIPPED | OUTPATIENT
Start: 2020-07-13 | End: 2020-10-16

## 2020-10-16 DIAGNOSIS — I50.41 ACUTE COMBINED SYSTOLIC AND DIASTOLIC HEART FAILURE (HCC): ICD-10-CM

## 2020-10-16 DIAGNOSIS — E66.01 MORBID OBESITY, UNSPECIFIED OBESITY TYPE (HCC): ICD-10-CM

## 2020-10-16 RX ORDER — METOPROLOL SUCCINATE 100 MG/1
TABLET, EXTENDED RELEASE ORAL
Qty: 180 TABLET | Refills: 3 | Status: SHIPPED | OUTPATIENT
Start: 2020-10-16 | End: 2021-11-02 | Stop reason: SDUPTHER

## 2020-11-11 RX ORDER — FUROSEMIDE 40 MG/1
TABLET ORAL
Qty: 90 TABLET | Refills: 0 | Status: SHIPPED | OUTPATIENT
Start: 2020-11-11 | End: 2021-02-12

## 2021-02-03 ENCOUNTER — HOSPITAL ENCOUNTER (OUTPATIENT)
Dept: CARDIOLOGY | Facility: HOSPITAL | Age: 76
Discharge: HOME | End: 2021-02-03
Admitting: NURSE PRACTITIONER

## 2021-02-03 ENCOUNTER — OFFICE VISIT (OUTPATIENT)
Dept: CARDIOLOGY | Facility: CLINIC | Age: 76
End: 2021-02-03

## 2021-02-03 VITALS
WEIGHT: 293 LBS | BODY MASS INDEX: 48.82 KG/M2 | HEART RATE: 90 BPM | DIASTOLIC BLOOD PRESSURE: 86 MMHG | RESPIRATION RATE: 22 BRPM | HEIGHT: 65 IN | SYSTOLIC BLOOD PRESSURE: 134 MMHG

## 2021-02-03 DIAGNOSIS — I10 ESSENTIAL HYPERTENSION: ICD-10-CM

## 2021-02-03 DIAGNOSIS — E66.01 MORBID OBESITY (HCC): ICD-10-CM

## 2021-02-03 DIAGNOSIS — I42.8 NICM (NONISCHEMIC CARDIOMYOPATHY) (HCC): ICD-10-CM

## 2021-02-03 DIAGNOSIS — I48.19 PERSISTENT ATRIAL FIBRILLATION (HCC): ICD-10-CM

## 2021-02-03 LAB
ALBUMIN SERPL-MCNC: 3.9 G/DL (ref 3.5–5.2)
ALBUMIN/GLOB SERPL: 1.1 G/DL
ALP SERPL-CCNC: 96 U/L (ref 39–117)
ALT SERPL W P-5'-P-CCNC: 12 U/L (ref 1–33)
ANION GAP SERPL CALCULATED.3IONS-SCNC: 11.9 MMOL/L (ref 5–15)
AST SERPL-CCNC: 24 U/L (ref 1–32)
BASOPHILS # BLD AUTO: 0.07 10*3/MM3 (ref 0–0.2)
BASOPHILS NFR BLD AUTO: 0.9 % (ref 0–1.5)
BILIRUB SERPL-MCNC: 0.4 MG/DL (ref 0–1.2)
BUN SERPL-MCNC: 22 MG/DL (ref 8–23)
BUN/CREAT SERPL: 23.2 (ref 7–25)
CALCIUM SPEC-SCNC: 9.2 MG/DL (ref 8.6–10.5)
CHLORIDE SERPL-SCNC: 104 MMOL/L (ref 98–107)
CO2 SERPL-SCNC: 22.1 MMOL/L (ref 22–29)
CREAT SERPL-MCNC: 0.95 MG/DL (ref 0.57–1)
DEPRECATED RDW RBC AUTO: 43.4 FL (ref 37–54)
EOSINOPHIL # BLD AUTO: 0.16 10*3/MM3 (ref 0–0.4)
EOSINOPHIL NFR BLD AUTO: 2 % (ref 0.3–6.2)
ERYTHROCYTE [DISTWIDTH] IN BLOOD BY AUTOMATED COUNT: 12.7 % (ref 12.3–15.4)
GFR SERPL CREATININE-BSD FRML MDRD: 57 ML/MIN/1.73
GLOBULIN UR ELPH-MCNC: 3.4 GM/DL
GLUCOSE SERPL-MCNC: 94 MG/DL (ref 65–99)
HCT VFR BLD AUTO: 45 % (ref 34–46.6)
HGB BLD-MCNC: 14.1 G/DL (ref 12–15.9)
IMM GRANULOCYTES # BLD AUTO: 0.05 10*3/MM3 (ref 0–0.05)
IMM GRANULOCYTES NFR BLD AUTO: 0.6 % (ref 0–0.5)
LYMPHOCYTES # BLD AUTO: 1.47 10*3/MM3 (ref 0.7–3.1)
LYMPHOCYTES NFR BLD AUTO: 18.6 % (ref 19.6–45.3)
MCH RBC QN AUTO: 29.1 PG (ref 26.6–33)
MCHC RBC AUTO-ENTMCNC: 31.3 G/DL (ref 31.5–35.7)
MCV RBC AUTO: 93 FL (ref 79–97)
MONOCYTES # BLD AUTO: 0.66 10*3/MM3 (ref 0.1–0.9)
MONOCYTES NFR BLD AUTO: 8.4 % (ref 5–12)
NEUTROPHILS NFR BLD AUTO: 5.49 10*3/MM3 (ref 1.7–7)
NEUTROPHILS NFR BLD AUTO: 69.5 % (ref 42.7–76)
NRBC BLD AUTO-RTO: 0 /100 WBC (ref 0–0.2)
PLATELET # BLD AUTO: 227 10*3/MM3 (ref 140–450)
PMV BLD AUTO: 10.5 FL (ref 6–12)
POTASSIUM SERPL-SCNC: 4.8 MMOL/L (ref 3.5–5.2)
PROT SERPL-MCNC: 7.3 G/DL (ref 6–8.5)
RBC # BLD AUTO: 4.84 10*6/MM3 (ref 3.77–5.28)
SODIUM SERPL-SCNC: 138 MMOL/L (ref 136–145)
WBC # BLD AUTO: 7.9 10*3/MM3 (ref 3.4–10.8)

## 2021-02-03 PROCEDURE — 36415 COLL VENOUS BLD VENIPUNCTURE: CPT

## 2021-02-03 PROCEDURE — 99213 OFFICE O/P EST LOW 20 MIN: CPT | Performed by: NURSE PRACTITIONER

## 2021-02-03 PROCEDURE — 80053 COMPREHEN METABOLIC PANEL: CPT | Performed by: NURSE PRACTITIONER

## 2021-02-03 PROCEDURE — 85025 COMPLETE CBC W/AUTO DIFF WBC: CPT | Performed by: NURSE PRACTITIONER

## 2021-02-03 PROCEDURE — 93000 ELECTROCARDIOGRAM COMPLETE: CPT | Performed by: NURSE PRACTITIONER

## 2021-02-03 NOTE — PROGRESS NOTES
Date of Office Visit: 2021  Encounter Provider: SADAF Quintero  Place of Service: [facility] CARDIOLOGY  Patient Name: Unique Talavera  :1945    Chief Complaint   Patient presents with   • Atrial Fibrillation     6 month follow up PAF/Cardiomyopathy   :     HPI: Unique Talavera is a 75 y.o. female who is a patient followed by Dr. Martines.  She has NICM, had A. fib, hypertension and morbid obesity.    She presents today for routine follow-up.    We did a telehealth visit in 2020 secondary to Covid.    Overall she says she is doing okay---she has not had any hospitalizations.     She denies chest pain, has chronic dyspnea with exertion--initially said it was worse that it had been but after discussing and talking about adjusting---adding new medications she says really unchanged, no PND or orthopnea. She is always very hesitant to try anything new---she worries about side effects.     She had some intermittent LE edema---we have discussed taking extra furosemide which she does from time to time.     She recently has been trying to watch her sodium intake---she was reading on one of her meals and it had 1100 mg in one meal---her family goes to the grocery for her so she eats a lot of processed foods.     She has not lost any weight---says I just have no luck with that.     She can feel her irregular heart beat sometimes---says occasionally feels rapid but not sustained.     Her primary complaint is sleeping issues---says she sometimes does not go to bed until really late and then wakes up multiple times---she does sometimes nap during the day---we discussed trying to adopt better sleeping habits.     She had not had any lab work in over a year---last ones in our system 10/2019---she is agreeable to getting labs today if they can be drawn in our office---we are able to do that so will check CBC and CMP.    She is on rivaroxaban for AC--no bleeding issues.     She is not  very active---walks with cane and came her in ---she really does not leave her house--she does go outside when the weather is nice.     She does have chronic back pain---has decrease her NSAID amount and is now taking CBD---which she says helps---we have discussed in the past and discussed again---the interaction with NSAIDS and AC.        Past Medical History:   Diagnosis Date   • Acute on chronic congestive heart failure (CMS/[facility])    • Afib (CMS/[facility])    • Arthritis    • Atrial fibrillation status post cardioversion (CMS/[facility]) 04/15/2016   • Cardiomyopathy (CMS/[facility])    • Chronic systolic congestive heart failure (CMS/[facility])    • Coronary artery disease    • Daytime sleepiness    • Depression    • Dizziness    • Hypertension    • Leukocytosis    • NICM (nonischemic cardiomyopathy) (CMS/[facility])    • Obesity     morbid   • Permanent atrial fibrillation (CMS/[facility])    • Persistent atrial fibrillation (CMS/[facility])    • Vertigo        Past Surgical History:   Procedure Laterality Date   • CARDIAC CATHETERIZATION N/A 2016    Procedure: Left Heart Cath;  Surgeon: Jostin Arce MD;  Location: [facility] INVASIVE LOCATION;  Service:    • CARDIAC CATHETERIZATION N/A 2016    Procedure: Right Heart Cath;  Surgeon: Jostin Arce MD;  Location: [facility] INVASIVE LOCATION;  Service:    •  SECTION     •  SECTION     • KNEE SURGERY     • WRIST SURGERY         Social History     Socioeconomic History   • Marital status:      Spouse name: Not on file   • Number of children: Not on file   • Years of education: Not on file   • Highest education level: Not on file   Tobacco Use   • Smoking status: Former Smoker   Substance and Sexual Activity   • Alcohol use: Yes     Comment: occassionally /caffeine use   • Drug use: No       Family History   Problem Relation Age of Onset   • Heart disease Mother    • Stroke Father        Review of Systems   Constitution: Negative for chills, fever and  malaise/fatigue.   Cardiovascular: Positive for dyspnea on exertion (chronic) and leg swelling (intermittent). Negative for chest pain, near-syncope, orthopnea, palpitations, paroxysmal nocturnal dyspnea and syncope.   Respiratory: Negative for cough and shortness of breath.    Hematologic/Lymphatic: Negative.    Musculoskeletal: Positive for arthritis and back pain. Negative for joint pain, joint swelling and myalgias.   Gastrointestinal: Negative for abdominal pain, diarrhea, melena, nausea and vomiting.   Genitourinary: Negative for frequency and hematuria.   Neurological: Negative for light-headedness, numbness, paresthesias and seizures.   Psychiatric/Behavioral: The patient has insomnia.    Allergic/Immunologic: Negative.    All other systems reviewed and are negative.      No Known Allergies      Current Outpatient Medications:   •  furosemide (LASIX) 40 MG tablet, TAKE ONE TABLET BY MOUTH DAILY, Disp: 90 tablet, Rfl: 0  •  metoprolol succinate XL (TOPROL-XL) 100 MG 24 hr tablet, TAKE ONE TABLET BY MOUTH TWICE A DAY, Disp: 180 tablet, Rfl: 3  •  potassium chloride (K-TAB) 20 MEQ tablet controlled-release ER tablet, Take 1 tablet by mouth Daily., Disp: 30 tablet, Rfl: 0  •  rivaroxaban (XARELTO) 20 MG tablet, Take 1 tablet by mouth Daily With Dinner., Disp: 28 tablet, Rfl: 0      Objective:     There were no vitals filed for this visit.  There is no height or weight on file to calculate BMI.    PHYSICAL EXAM:    Vitals Reviewed.   General Appearance: No acute distress, obese female.  Eyes: Conjunctiva and lids: No erythema, swelling, or discharge. Sclera non-icteric.   HENT: Atraumatic, normocephalic. External eyes, ears, and nose normal.   Respiratory: No signs of respiratory distress. Respiration rhythm and depth normal.   Clear to auscultation. No rales, crackles, rhonchi, or wheezing auscultated.   Cardiovascular:  Heart Rate and Rhythm: Irregularly, irregular.  Heart Sounds: S1 and S2.   Murmurs: No  murmurs noted. No rubs, thrills, or gallops.   Lower Extremities: No edema noted.  Gastrointestinal:  Abdomen obese, nontender.  Musculoskeletal: Moves all extremities.   Skin and Nails: General appearance normal. No pallor, cyanosis, diaphoresis. Skin temperature normal. No clubbing of fingernails.   Psychiatric: Patient alert and oriented to person, place, and time. Speech and behavior appropriate. Normal mood and affect.       ECG 12 Lead    Date/Time: 2/3/2021 9:31 AM  Performed by: Nazanin Paige APRN  Authorized by: Nazanin Paige APRN   Comparison: compared with previous ECG   Similar to previous ECG  Rhythm: atrial fibrillation  BPM: 104                Assessment:       Diagnosis Plan   1. NICM (nonischemic cardiomyopathy) (CMS/HCC)     2. Persistent atrial fibrillation (CMS/HCC)     3. Morbid obesity (CMS/HCC)     4. Essential hypertension            Plan:       1. NICM---she is on metoprolol and diuretic---we have discussed adding ACE/ARB---she in not open to medication changes at this time. Volume status is stable and she is euvolemic by exam today. She is a DNR and really just wants to maintain at this time. I am actually surprised that she has not had any hospitalizations since 10/2019. We discussed watching sodium/dietary intake.     2.  Permanent A. Fib--overall I think her HR is okay---we talked about checking a monitor but she wants to avoid that---she occasionally feels like her HR is elevated but it does not last long---just brief---she does not check b/p or HR at home. Metoprolol and r-ban.     3.  Morbid obesity---we discussed importance of weight loss and some regular activity.     4. HTN, controlled.     She did agree to have labs done today since we were able to draw them in the office---CBC/CMP done, will call with results when available. Will have her follow up with Dr. Martines in 6 months.     As always, it has been a pleasure to participate in your patient's care.      Sincerely,          Nazanin Paige, APRN

## 2021-02-12 RX ORDER — FUROSEMIDE 40 MG/1
TABLET ORAL
Qty: 90 TABLET | Refills: 0 | Status: SHIPPED | OUTPATIENT
Start: 2021-02-12 | End: 2021-05-03

## 2021-03-02 DIAGNOSIS — Z23 IMMUNIZATION DUE: ICD-10-CM

## 2021-05-03 RX ORDER — FUROSEMIDE 40 MG/1
TABLET ORAL
Qty: 90 TABLET | Refills: 0 | Status: SHIPPED | OUTPATIENT
Start: 2021-05-03 | End: 2021-07-29

## 2021-07-29 RX ORDER — FUROSEMIDE 40 MG/1
TABLET ORAL
Qty: 90 TABLET | Refills: 0 | Status: SHIPPED | OUTPATIENT
Start: 2021-07-29 | End: 2021-09-08 | Stop reason: SDUPTHER

## 2021-09-08 ENCOUNTER — OFFICE VISIT (OUTPATIENT)
Dept: CARDIOLOGY | Facility: CLINIC | Age: 76
End: 2021-09-08

## 2021-09-08 VITALS — BODY MASS INDEX: 48.82 KG/M2 | HEIGHT: 65 IN | WEIGHT: 293 LBS

## 2021-09-08 DIAGNOSIS — I42.8 NICM (NONISCHEMIC CARDIOMYOPATHY) (HCC): Primary | ICD-10-CM

## 2021-09-08 DIAGNOSIS — E66.01 MORBID OBESITY (HCC): ICD-10-CM

## 2021-09-08 DIAGNOSIS — I48.19 PERSISTENT ATRIAL FIBRILLATION (HCC): ICD-10-CM

## 2021-09-08 DIAGNOSIS — I10 ESSENTIAL HYPERTENSION: ICD-10-CM

## 2021-09-08 PROCEDURE — 99441 PR PHYS/QHP TELEPHONE EVALUATION 5-10 MIN: CPT | Performed by: NURSE PRACTITIONER

## 2021-09-08 RX ORDER — FUROSEMIDE 40 MG/1
TABLET ORAL
Qty: 180 TABLET | Refills: 3 | Status: SHIPPED | OUTPATIENT
Start: 2021-09-08 | End: 2021-10-29

## 2021-09-08 RX ORDER — POTASSIUM CHLORIDE 1500 MG/1
TABLET, FILM COATED, EXTENDED RELEASE ORAL
Qty: 135 TABLET | Refills: 3 | Status: SHIPPED | OUTPATIENT
Start: 2021-09-08 | End: 2022-02-14

## 2021-09-08 NOTE — PROGRESS NOTES
Date of Office Visit: 2021  Encounter Provider: SADAF Quintero  Place of Service: [facility] CARDIOLOGY  Patient Name: Unique Talavera  :1945    Chief Complaint   Patient presents with   • NICM     6 month f/u    • persistent AFIB   • permanent AFIB   :     HPI: Unique Talavera is a 76 y.o. female who followed by Dr. Martines.  She has NICM, afib, HTN and morbid obesity/    She was scheduled to come into office today for a routine follow up visit but secondary to COVID 19 and mobility issues, she requested a telehealth visit via telephone.      This patient has consented to a telehealth visit via audio/telephone. The visit was scheduled as a telehealth telephone visit to comply with patient safety concerns in accordance with CDC recommendations.  All vitals recorded within this visit are reported by the patient.  I spent 10 minutes in total including but not limited to the 6minutes spent in direct conversation with this patient.     She reports that overall she is doing well.  She has not had any recent hospitalizations.  She denies chest pain, she has chronic dyspnea with exertion but says this is unchanged.  She says that she does still have intermittent lower extremity edema and takes an extra dose of Lasix and a half of a potassium a couple of days a week.    She reports that she continues to work on weight loss.    She has some occasional palpitations but overall really does not feel her heart racing.    She remains on rivaroxaban for anticoagulation, she denies any bleeding issues.    She continues to have chronic back pain but says she takes 800 mg ibuprofen daily and this seems to work okay, we have discussed the interaction with her anticoagulation in the past and we discussed again today, she monitors for bleeding.     Past Medical History:   Diagnosis Date   • Acute on chronic congestive heart failure (CMS/HCC)    • Afib (CMS/HCC)    • Arthritis    • Atrial  fibrillation status post cardioversion (CMS/[facility]) 04/15/2016   • Cardiomyopathy (CMS/[facility])    • Chronic systolic congestive heart failure (CMS/[facility])    • Coronary artery disease    • Daytime sleepiness    • Depression    • Dizziness    • Hypertension    • Leukocytosis    • NICM (nonischemic cardiomyopathy) (CMS/[facility])    • Obesity     morbid   • Permanent atrial fibrillation (CMS/[facility])    • Persistent atrial fibrillation (CMS/[facility])    • Vertigo        Past Surgical History:   Procedure Laterality Date   • CARDIAC CATHETERIZATION N/A 2016    Procedure: Left Heart Cath;  Surgeon: Jostin Arce MD;  Location: [facility] CATH INVASIVE LOCATION;  Service:    • CARDIAC CATHETERIZATION N/A 2016    Procedure: Right Heart Cath;  Surgeon: Jostin Arce MD;  Location:  [facility] CATH INVASIVE LOCATION;  Service:    •  SECTION     •  SECTION     • KNEE SURGERY     • WRIST SURGERY         Social History     Socioeconomic History   • Marital status:      Spouse name: Not on file   • Number of children: Not on file   • Years of education: Not on file   • Highest education level: Not on file   Tobacco Use   • Smoking status: Former Smoker   • Smokeless tobacco: Never Used   Vaping Use   • Vaping Use: Never used   Substance and Sexual Activity   • Alcohol use: Yes     Comment: occassionally /caffeine use   • Drug use: No   • Sexual activity: Defer       Family History   Problem Relation Age of Onset   • Heart disease Mother    • Stroke Father        Review of Systems   Constitutional: Negative for chills, fever and malaise/fatigue.   Cardiovascular: Positive for leg swelling and palpitations. Negative for chest pain, dyspnea on exertion, near-syncope, orthopnea, paroxysmal nocturnal dyspnea and syncope.   Respiratory: Negative for cough and shortness of breath.    Hematologic/Lymphatic: Negative.    Musculoskeletal: Positive for back pain. Negative for joint pain, joint swelling and myalgias.  "  Gastrointestinal: Negative for abdominal pain, diarrhea, melena, nausea and vomiting.   Genitourinary: Negative for frequency and hematuria.   Neurological: Negative for light-headedness, numbness, paresthesias and seizures.   Allergic/Immunologic: Negative.    All other systems reviewed and are negative.      No Known Allergies      Current Outpatient Medications:   •  furosemide (LASIX) 40 MG tablet, TAKE ONE TABLET BY MOUTH DAILY, Disp: 90 tablet, Rfl: 0  •  metoprolol succinate XL (TOPROL-XL) 100 MG 24 hr tablet, TAKE ONE TABLET BY MOUTH TWICE A DAY, Disp: 180 tablet, Rfl: 3  •  potassium chloride (K-TAB) 20 MEQ tablet controlled-release ER tablet, Take 1 tablet by mouth Daily., Disp: 30 tablet, Rfl: 0  •  rivaroxaban (XARELTO) 20 MG tablet, Take 1 tablet by mouth Daily With Dinner., Disp: 28 tablet, Rfl: 0      Objective:     Vitals:    09/08/21 0914   Weight: (!) 145 kg (319 lb)   Height: 165.1 cm (65\")     Body mass index is 53.08 kg/m².    PHYSICAL EXAM: Telehealth visit via telephone    Vitals Reviewed.  Did not have any blood pressure or heart rate reading.  Her weight was 319 and she says this is been stable.  Respiratory: She was not labored or in respiratory distress during our conversation over the phone, she denied any shortness of breath.  Psychiatric: She was alert, oriented x3 and her speech was clear and appropriate.      Assessment:       Diagnosis Plan   1. NICM (nonischemic cardiomyopathy) (CMS/HCC)     2. Persistent atrial fibrillation (CMS/HCC)     3. Morbid obesity (CMS/HCC)     4. Essential hypertension            Plan:       1.  Nonischemic cardiomyopathy, she takes an extra dose of Lasix a couple times a week, she has been doing this consistently, she says she still has some lower extremity edema but her breathing is doing fine.    2.  Persistent A. fib, she has some occasional palpitations but denies that she feels her heart racing although we did not get a heart rate today.  He " remains on rivaroxaban for anticoagulation and metoprolol.    3.  Morbid obesity, she says she continues to work on weight loss.    4.  Hypertension, she was not able to check her blood pressure today.    We will have her follow-up with Dr. Martines in the office in 6 months.    As always, it has been a pleasure to participate in your patient's care.      Sincerely,         SADAF Maravilla

## 2021-10-29 RX ORDER — FUROSEMIDE 40 MG/1
TABLET ORAL
Qty: 90 TABLET | Refills: 1 | Status: SHIPPED | OUTPATIENT
Start: 2021-10-29 | End: 2022-02-19 | Stop reason: HOSPADM

## 2021-11-02 DIAGNOSIS — I50.41 ACUTE COMBINED SYSTOLIC AND DIASTOLIC HEART FAILURE (HCC): ICD-10-CM

## 2021-11-02 DIAGNOSIS — E66.01 MORBID OBESITY, UNSPECIFIED OBESITY TYPE (HCC): ICD-10-CM

## 2021-11-02 RX ORDER — METOPROLOL SUCCINATE 100 MG/1
100 TABLET, EXTENDED RELEASE ORAL 2 TIMES DAILY
Qty: 180 TABLET | Refills: 3 | Status: SHIPPED | OUTPATIENT
Start: 2021-11-02 | End: 2022-11-02 | Stop reason: SDUPTHER

## 2022-02-14 ENCOUNTER — HOSPITAL ENCOUNTER (INPATIENT)
Facility: HOSPITAL | Age: 77
LOS: 5 days | Discharge: HOME | End: 2022-02-19
Attending: EMERGENCY MEDICINE | Admitting: INTERNAL MEDICINE

## 2022-02-14 ENCOUNTER — APPOINTMENT (OUTPATIENT)
Dept: GENERAL RADIOLOGY | Facility: HOSPITAL | Age: 77
End: 2022-02-14

## 2022-02-14 DIAGNOSIS — E66.01 MORBID OBESITY: ICD-10-CM

## 2022-02-14 DIAGNOSIS — R09.02 HYPOXIA: ICD-10-CM

## 2022-02-14 DIAGNOSIS — I48.91 ATRIAL FIBRILLATION, UNSPECIFIED TYPE: ICD-10-CM

## 2022-02-14 DIAGNOSIS — I50.22 CHRONIC SYSTOLIC CHF (CONGESTIVE HEART FAILURE): ICD-10-CM

## 2022-02-14 DIAGNOSIS — I50.9 ACUTE ON CHRONIC CONGESTIVE HEART FAILURE, UNSPECIFIED HEART FAILURE TYPE: Primary | ICD-10-CM

## 2022-02-14 LAB
ALBUMIN SERPL-MCNC: 4 G/DL (ref 3.5–5.2)
ALBUMIN/GLOB SERPL: 1 G/DL
ALP SERPL-CCNC: 112 U/L (ref 39–117)
ALT SERPL W P-5'-P-CCNC: 11 U/L (ref 1–33)
ANION GAP SERPL CALCULATED.3IONS-SCNC: 15 MMOL/L (ref 5–15)
AST SERPL-CCNC: 27 U/L (ref 1–32)
BASOPHILS # BLD AUTO: 0.06 10*3/MM3 (ref 0–0.2)
BASOPHILS NFR BLD AUTO: 0.7 % (ref 0–1.5)
BILIRUB SERPL-MCNC: 0.7 MG/DL (ref 0–1.2)
BUN SERPL-MCNC: 17 MG/DL (ref 8–23)
BUN/CREAT SERPL: 15.6 (ref 7–25)
CALCIUM SPEC-SCNC: 8.7 MG/DL (ref 8.6–10.5)
CHLORIDE SERPL-SCNC: 105 MMOL/L (ref 98–107)
CO2 SERPL-SCNC: 20 MMOL/L (ref 22–29)
CREAT SERPL-MCNC: 1.09 MG/DL (ref 0.57–1)
DEPRECATED RDW RBC AUTO: 44.3 FL (ref 37–54)
EOSINOPHIL # BLD AUTO: 0.11 10*3/MM3 (ref 0–0.4)
EOSINOPHIL NFR BLD AUTO: 1.2 % (ref 0.3–6.2)
ERYTHROCYTE [DISTWIDTH] IN BLOOD BY AUTOMATED COUNT: 13 % (ref 12.3–15.4)
GFR SERPL CREATININE-BSD FRML MDRD: 49 ML/MIN/1.73
GLOBULIN UR ELPH-MCNC: 3.9 GM/DL
GLUCOSE SERPL-MCNC: 101 MG/DL (ref 65–99)
HCT VFR BLD AUTO: 45 % (ref 34–46.6)
HGB BLD-MCNC: 14.5 G/DL (ref 12–15.9)
IMM GRANULOCYTES # BLD AUTO: 0.05 10*3/MM3 (ref 0–0.05)
IMM GRANULOCYTES NFR BLD AUTO: 0.6 % (ref 0–0.5)
LYMPHOCYTES # BLD AUTO: 1.45 10*3/MM3 (ref 0.7–3.1)
LYMPHOCYTES NFR BLD AUTO: 16 % (ref 19.6–45.3)
MCH RBC QN AUTO: 29.8 PG (ref 26.6–33)
MCHC RBC AUTO-ENTMCNC: 32.2 G/DL (ref 31.5–35.7)
MCV RBC AUTO: 92.6 FL (ref 79–97)
MONOCYTES # BLD AUTO: 0.68 10*3/MM3 (ref 0.1–0.9)
MONOCYTES NFR BLD AUTO: 7.5 % (ref 5–12)
NEUTROPHILS NFR BLD AUTO: 6.73 10*3/MM3 (ref 1.7–7)
NEUTROPHILS NFR BLD AUTO: 74 % (ref 42.7–76)
NRBC BLD AUTO-RTO: 0 /100 WBC (ref 0–0.2)
NT-PROBNP SERPL-MCNC: 2372 PG/ML (ref 0–1800)
PLATELET # BLD AUTO: 224 10*3/MM3 (ref 140–450)
PMV BLD AUTO: 10.4 FL (ref 6–12)
POTASSIUM SERPL-SCNC: 4.4 MMOL/L (ref 3.5–5.2)
PROT SERPL-MCNC: 7.9 G/DL (ref 6–8.5)
QT INTERVAL: 417 MS
RBC # BLD AUTO: 4.86 10*6/MM3 (ref 3.77–5.28)
SARS-COV-2 RNA PNL SPEC NAA+PROBE: NOT DETECTED
SODIUM SERPL-SCNC: 140 MMOL/L (ref 136–145)
TROPONIN T SERPL-MCNC: <0.01 NG/ML (ref 0–0.03)
TROPONIN T SERPL-MCNC: <0.01 NG/ML (ref 0–0.03)
WBC NRBC COR # BLD: 9.08 10*3/MM3 (ref 3.4–10.8)

## 2022-02-14 PROCEDURE — 93005 ELECTROCARDIOGRAM TRACING: CPT | Performed by: INTERNAL MEDICINE

## 2022-02-14 PROCEDURE — 93005 ELECTROCARDIOGRAM TRACING: CPT | Performed by: EMERGENCY MEDICINE

## 2022-02-14 PROCEDURE — 83880 ASSAY OF NATRIURETIC PEPTIDE: CPT | Performed by: PHYSICIAN ASSISTANT

## 2022-02-14 PROCEDURE — 25010000002 FUROSEMIDE PER 20 MG: Performed by: PHYSICIAN ASSISTANT

## 2022-02-14 PROCEDURE — 93010 ELECTROCARDIOGRAM REPORT: CPT | Performed by: INTERNAL MEDICINE

## 2022-02-14 PROCEDURE — 84484 ASSAY OF TROPONIN QUANT: CPT | Performed by: INTERNAL MEDICINE

## 2022-02-14 PROCEDURE — 71045 X-RAY EXAM CHEST 1 VIEW: CPT

## 2022-02-14 PROCEDURE — 80053 COMPREHEN METABOLIC PANEL: CPT | Performed by: PHYSICIAN ASSISTANT

## 2022-02-14 PROCEDURE — 84484 ASSAY OF TROPONIN QUANT: CPT | Performed by: PHYSICIAN ASSISTANT

## 2022-02-14 PROCEDURE — 99284 EMERGENCY DEPT VISIT MOD MDM: CPT

## 2022-02-14 PROCEDURE — 93005 ELECTROCARDIOGRAM TRACING: CPT | Performed by: PHYSICIAN ASSISTANT

## 2022-02-14 PROCEDURE — 87635 SARS-COV-2 COVID-19 AMP PRB: CPT | Performed by: PHYSICIAN ASSISTANT

## 2022-02-14 PROCEDURE — 85025 COMPLETE CBC W/AUTO DIFF WBC: CPT | Performed by: PHYSICIAN ASSISTANT

## 2022-02-14 PROCEDURE — 25010000002 FUROSEMIDE PER 20 MG: Performed by: INTERNAL MEDICINE

## 2022-02-14 RX ORDER — ONDANSETRON 2 MG/ML
4 INJECTION INTRAMUSCULAR; INTRAVENOUS EVERY 6 HOURS PRN
Status: DISCONTINUED | OUTPATIENT
Start: 2022-02-14 | End: 2022-02-19 | Stop reason: HOSPADM

## 2022-02-14 RX ORDER — FUROSEMIDE 10 MG/ML
40 INJECTION INTRAMUSCULAR; INTRAVENOUS EVERY 12 HOURS
Status: DISCONTINUED | OUTPATIENT
Start: 2022-02-14 | End: 2022-02-17

## 2022-02-14 RX ORDER — ACETAMINOPHEN 325 MG/1
650 TABLET ORAL EVERY 4 HOURS PRN
Status: DISCONTINUED | OUTPATIENT
Start: 2022-02-14 | End: 2022-02-19 | Stop reason: HOSPADM

## 2022-02-14 RX ORDER — SODIUM CHLORIDE 0.9 % (FLUSH) 0.9 %
10 SYRINGE (ML) INJECTION AS NEEDED
Status: DISCONTINUED | OUTPATIENT
Start: 2022-02-14 | End: 2022-02-19 | Stop reason: HOSPADM

## 2022-02-14 RX ORDER — METOPROLOL SUCCINATE 100 MG/1
100 TABLET, EXTENDED RELEASE ORAL 2 TIMES DAILY
Status: DISCONTINUED | OUTPATIENT
Start: 2022-02-14 | End: 2022-02-19 | Stop reason: HOSPADM

## 2022-02-14 RX ORDER — FUROSEMIDE 10 MG/ML
80 INJECTION INTRAMUSCULAR; INTRAVENOUS ONCE
Status: COMPLETED | OUTPATIENT
Start: 2022-02-14 | End: 2022-02-14

## 2022-02-14 RX ORDER — UREA 10 %
3 LOTION (ML) TOPICAL NIGHTLY PRN
Status: DISCONTINUED | OUTPATIENT
Start: 2022-02-14 | End: 2022-02-19 | Stop reason: HOSPADM

## 2022-02-14 RX ORDER — NITROGLYCERIN 0.4 MG/1
0.4 TABLET SUBLINGUAL
Status: DISCONTINUED | OUTPATIENT
Start: 2022-02-14 | End: 2022-02-19 | Stop reason: HOSPADM

## 2022-02-14 RX ORDER — ONDANSETRON 4 MG/1
4 TABLET, FILM COATED ORAL EVERY 6 HOURS PRN
Status: DISCONTINUED | OUTPATIENT
Start: 2022-02-14 | End: 2022-02-19 | Stop reason: HOSPADM

## 2022-02-14 RX ADMIN — FUROSEMIDE 80 MG: 10 INJECTION, SOLUTION INTRAMUSCULAR; INTRAVENOUS at 11:04

## 2022-02-14 RX ADMIN — RIVAROXABAN 20 MG: 20 TABLET, FILM COATED ORAL at 21:37

## 2022-02-14 RX ADMIN — FUROSEMIDE 40 MG: 10 INJECTION, SOLUTION INTRAMUSCULAR; INTRAVENOUS at 21:37

## 2022-02-14 RX ADMIN — METOPROLOL SUCCINATE 100 MG: 100 TABLET, EXTENDED RELEASE ORAL at 21:37

## 2022-02-14 NOTE — ED TRIAGE NOTES
Pt complains of increased generalized edema for the last several months. States that she has had increased SOA over the last few days. Has a hx of CHF. Patient masked at arrival and triage staff wore all appropriate PPE during entire encounter with patient.

## 2022-02-14 NOTE — ED PROVIDER NOTES
EMERGENCY DEPARTMENT ENCOUNTER    Room Number:  E665/1  Date of encounter:  2/14/2022  PCP: Elvis Diaz MD  Historian: Patient      I used full protective equipment while examining this patient.  This includes face mask, gloves and protective eyewear.  I washed my hands before entering the room and immediately upon leaving the room      HPI:  Chief Complaint: Shortness of breath  A complete HPI/ROS/PMH/PSH/SH/FH are unobtainable due to: Nothing    Context: Unique Talavera is a 76 y.o. female who presents to the ED c/o 2-week history of progressively worsening, exertional shortness of breath.  Patient has a history of morbid obesity, atrial fibrillation, nonischemic cardiomyopathy, CHF.  Patient follows with Dr. Martines in cardiology.  Patient does admit to mild chest pain from time to time.  Patient has been compliant with her Lasix 40 mg once a day and states she occasionally takes second dose which has not been helping.  She states that she is very weak and cannot lift her legs off the bed.  She is unsure about any weight gain.  Upon entering the room patient's sats were 86% while walking.  Patient is on Xarelto for atrial fibrillation.    Review of Medical Records  I reviewed echocardiogram from 8/4/2017.  Patient had an EF of 37%.      PAST MEDICAL HISTORY  Active Ambulatory Problems     Diagnosis Date Noted   • Persistent atrial fibrillation (HCC) 04/11/2016   • Arthritis 04/22/2016   • Morbid obesity ([facility]) 04/22/2016   • NICM (nonischemic cardiomyopathy) ([facility]) 07/20/2016   • Vertigo 07/21/2016   • Daytime sleepiness 05/15/2019   • Acute on chronic systolic congestive heart failure ([facility]) 10/25/2019   • DNR (do not resuscitate) 10/27/2019   • Essential hypertension 02/03/2021     Resolved Ambulatory Problems     Diagnosis Date Noted   • Vertigo 07/20/2016   • Dizziness 07/20/2016   • CVA (cerebral infarction) 07/20/2016   • Noncompliance 07/20/2016   • Leukocytosis 07/20/2016     Past Medical History:    Diagnosis Date   • Acute on chronic congestive heart failure (HCC)    • Afib (HCC)    • Atrial fibrillation status post cardioversion (HCC) 04/15/2016   • Cardiomyopathy (HCC)    • Chronic systolic congestive heart failure (HCC)    • Coronary artery disease    • Depression    • Hypertension    • Obesity    • Permanent atrial fibrillation (HCC)          PAST SURGICAL HISTORY  Past Surgical History:   Procedure Laterality Date   • CARDIAC CATHETERIZATION N/A 2016    Procedure: Left Heart Cath;  Surgeon: Jostin Arce MD;  Location: [facility] CATH INVASIVE LOCATION;  Service:    • CARDIAC CATHETERIZATION N/A 2016    Procedure: Right Heart Cath;  Surgeon: Jostin Arce MD;  Location:  [facility] CATH INVASIVE LOCATION;  Service:    •  SECTION     •  SECTION     • KNEE SURGERY     • WRIST SURGERY           FAMILY HISTORY  Family History   Problem Relation Age of Onset   • Heart disease Mother    • Stroke Father          SOCIAL HISTORY  Social History     Socioeconomic History   • Marital status:    Tobacco Use   • Smoking status: Former Smoker   • Smokeless tobacco: Never Used   Vaping Use   • Vaping Use: Never used   Substance and Sexual Activity   • Alcohol use: Yes     Comment: occassionally /caffeine use   • Drug use: No   • Sexual activity: Defer         ALLERGIES  Patient has no known allergies.        REVIEW OF SYSTEMS  All systems reviewed and negative except for those discussed in HPI.       PHYSICAL EXAM    I have reviewed the triage vital signs and nursing notes.    ED Triage Vitals   Temp Heart Rate Resp BP SpO2   22 1017 22 1015 22 1015 22 1018 22 1015   97.6 °F (36.4 °C) 90 20 151/97 (!) 86 %      Temp src Heart Rate Source Patient Position BP Location FiO2 (%)   22 1017 -- 22 1018 -- --   Tympanic  Sitting         Physical Exam  GENERAL: Alert, chronically ill-appearing, obese, not distressed  HENT: head atraumatic, no  nuchal rigidity  EYES: no scleral icterus, EOMI  CV: regular rhythm, regular rate, no murmur  RESPIRATORY: Mildly tachypneic.  Rales present.  ABDOMEN: soft, nontender  MUSCULOSKELETAL: no deformity, FROM, 2+ pedal edema bilaterally  NEURO: alert, moves all extremities, follows commands  SKIN: warm, dry        LAB RESULTS  Recent Results (from the past 24 hour(s))   Comprehensive Metabolic Panel    Collection Time: 02/14/22 10:39 AM    Specimen: Blood   Result Value Ref Range    Glucose 101 (H) 65 - 99 mg/dL    BUN 17 8 - 23 mg/dL    Creatinine 1.09 (H) 0.57 - 1.00 mg/dL    Sodium 140 136 - 145 mmol/L    Potassium 4.4 3.5 - 5.2 mmol/L    Chloride 105 98 - 107 mmol/L    CO2 20.0 (L) 22.0 - 29.0 mmol/L    Calcium 8.7 8.6 - 10.5 mg/dL    Total Protein 7.9 6.0 - 8.5 g/dL    Albumin 4.00 3.50 - 5.20 g/dL    ALT (SGPT) 11 1 - 33 U/L    AST (SGOT) 27 1 - 32 U/L    Alkaline Phosphatase 112 39 - 117 U/L    Total Bilirubin 0.7 0.0 - 1.2 mg/dL    eGFR Non African Amer 49 (L) >60 mL/min/1.73    Globulin 3.9 gm/dL    A/G Ratio 1.0 g/dL    BUN/Creatinine Ratio 15.6 7.0 - 25.0    Anion Gap 15.0 5.0 - 15.0 mmol/L   BNP    Collection Time: 02/14/22 10:39 AM    Specimen: Blood   Result Value Ref Range    proBNP 2,372.0 (H) 0.0-1,800.0 pg/mL   Troponin    Collection Time: 02/14/22 10:39 AM    Specimen: Blood   Result Value Ref Range    Troponin T <0.010 0.000 - 0.030 ng/mL   CBC Auto Differential    Collection Time: 02/14/22 10:39 AM    Specimen: Blood   Result Value Ref Range    WBC 9.08 3.40 - 10.80 10*3/mm3    RBC 4.86 3.77 - 5.28 10*6/mm3    Hemoglobin 14.5 12.0 - 15.9 g/dL    Hematocrit 45.0 34.0 - 46.6 %    MCV 92.6 79.0 - 97.0 fL    MCH 29.8 26.6 - 33.0 pg    MCHC 32.2 31.5 - 35.7 g/dL    RDW 13.0 12.3 - 15.4 %    RDW-SD 44.3 37.0 - 54.0 fl    MPV 10.4 6.0 - 12.0 fL    Platelets 224 140 - 450 10*3/mm3    Neutrophil % 74.0 42.7 - 76.0 %    Lymphocyte % 16.0 (L) 19.6 - 45.3 %    Monocyte % 7.5 5.0 - 12.0 %    Eosinophil % 1.2 0.3  - 6.2 %    Basophil % 0.7 0.0 - 1.5 %    Immature Grans % 0.6 (H) 0.0 - 0.5 %    Neutrophils, Absolute 6.73 1.70 - 7.00 10*3/mm3    Lymphocytes, Absolute 1.45 0.70 - 3.10 10*3/mm3    Monocytes, Absolute 0.68 0.10 - 0.90 10*3/mm3    Eosinophils, Absolute 0.11 0.00 - 0.40 10*3/mm3    Basophils, Absolute 0.06 0.00 - 0.20 10*3/mm3    Immature Grans, Absolute 0.05 0.00 - 0.05 10*3/mm3    nRBC 0.0 0.0 - 0.2 /100 WBC   COVID-19,[facility]-HOU[facility] CEPHEID/DAMON NP SWAB IN TRANSPORT MEDIA 8-12 HR TAT - Swab, Nasopharynx    Collection Time: 02/14/22 10:54 AM    Specimen: Nasopharynx; Swab   Result Value Ref Range    COVID19 Not Detected Not Detected - Ref. Range   ECG 12 Lead    Collection Time: 02/14/22 11:28 AM   Result Value Ref Range    QT Interval 417 ms       Ordered the above labs and independently reviewed the results.        RADIOLOGY  XR Chest 1 View    Result Date: 2/14/2022  Portal chest x-ray  HISTORY: Generalized edema for several months. Worsening shortness of breath. Previous diagnosis of heart failure.  TECHNIQUE: A single portable chest x-ray images correlated with chest x-ray October 25, 2019.  FINDINGS: There is pronounced enlargement of the cardiac silhouette which appears similar to that observed on the prior x-ray. Central pulmonary vasculature is engorged. No interstitial edema is present. The lungs appear clear and the costophrenic sulci are dry. There is no pneumothorax.      Cardiomegaly with pulmonary vascular engorgement. No pulmonary interstitial edema or pneumonia is evident.  This report was finalized on 2/14/2022 11:08 AM by Dr. Lloyd Armstrong M.D.        I ordered the above noted radiological studies. Reviewed by me and discussed with radiologist.  See dictation for official radiology interpretation.      MEDICATIONS GIVEN IN ER    Medications   sodium chloride 0.9 % flush 10 mL (has no administration in time range)   nitroglycerin (NITROSTAT) SL tablet 0.4 mg (has no administration in time  range)   acetaminophen (TYLENOL) tablet 650 mg (has no administration in time range)   ondansetron (ZOFRAN) tablet 4 mg (has no administration in time range)     Or   ondansetron (ZOFRAN) injection 4 mg (has no administration in time range)   melatonin tablet 3 mg (has no administration in time range)   furosemide (LASIX) injection 40 mg (has no administration in time range)   metoprolol succinate XL (TOPROL-XL) 24 hr tablet 100 mg (has no administration in time range)   rivaroxaban (XARELTO) tablet 20 mg (has no administration in time range)   furosemide (LASIX) injection 80 mg (80 mg Intravenous Given 2/14/22 1104)         PROGRESS, DATA ANALYSIS, CONSULTS, AND MEDICAL DECISION MAKING    All labs have been independently reviewed by me.  All radiology studies have been reviewed by me and discussed with radiologist dictating the report.   EKG's independently viewed and interpreted by me.  Discussion below represents my analysis of pertinent findings related to patient's condition, differential diagnosis, treatment plan and final disposition.    I have discussed case with Dr. Vences, emergency room physician.  He has performed his own bedside examination and agrees with treatment plan.    ED Course as of 02/14/22 2029   Mon Feb 14, 2022   1037 Patient presents with progressive 2-week history of exertional shortness of breath.  Patient has known history of heart failure.  She denies any fevers or chills.  Differential diagnoses include not limited to CHF exacerbation, COPD, pneumonia, PE. [EE]   1101 WBC: 9.08 [EE]   1101 Hemoglobin: 14.5 [EE]   1201 COVID19: Not Detected [EE]   1201 Troponin T: <0.010 [EE]   1445 I called and spoke with Lizeth, nurse practitioner with local cardiology.  They agree on admission.  They request that medicine admit. [EE]   1538 I discussed patient with Dr. Martinez [facility].  She agrees to admit. [EE]      ED Course User Index  [EE] Ángel Ellis PA       AS OF 20:29 EST VITALS:    BP -  155/96  HR - 76  TEMP - 98.7 °F (37.1 °C) (Oral)  O2 SATS - 93%        DIAGNOSIS  Final diagnoses:   Acute on chronic congestive heart failure, unspecified heart failure type (HCC)   Hypoxia   Atrial fibrillation, unspecified type (HCC)         DISPOSITION  Admitted      Dictated utilizing Dragon dictation     Ángel Ellis PA  02/14/22 0035

## 2022-02-14 NOTE — ED NOTES
This RN wearing all appropriate PPE during patient encounter. Hand hygiene performed before and during entering room.       Marcela Acosta, RN  02/14/22 [id number]

## 2022-02-14 NOTE — ED PROVIDER NOTES
MD ATTESTATION NOTE    The LEONORA and I have discussed this patient's history, physical exam, and treatment plan.  I have reviewed the documentation and personally had a face to face interaction with the patient. I affirm the documentation and agree with the treatment and plan.  The attached note describes my personal findings.      I provided a substantive portion of the care of the patient.  I personally performed the physical exam in its entirety, and below are my findings.  For this patient encounter, the patient wore surgical mask, I wore full protective PPE including N95 and eye protection.      Brief HPI: Patient presents with weakness and progressive shortness of breath.  She does have history of cardiomyopathy and congestive heart failure.  She also has atrial fib.  She has not been febrile but was hypoxic on arrival    PHYSICAL EXAM  ED Triage Vitals   Temp Heart Rate Resp BP SpO2   02/14/22 1017 02/14/22 1015 02/14/22 1015 02/14/22 1018 02/14/22 1015   97.6 °F (36.4 °C) 90 20 151/97 (!) 86 %      Temp src Heart Rate Source Patient Position BP Location FiO2 (%)   02/14/22 1017 -- 02/14/22 1018 -- --   Tympanic  Sitting           GENERAL: Awake and alert, morbidly obese  HENT: nares patent  EYES: no scleral icterus  CV: Sinus rhythm  RESPIRATORY: Tachypneic, rales in the bases  ABDOMEN: soft  MUSCULOSKELETAL: no deformity, trace pedal edema bilaterally  NEURO: alert, moves all extremities, follows commands  PSYCH:  calm, cooperative  SKIN: warm, dry    Vital signs and nursing notes reviewed.        Plan: Patient appears to have exacerbation of CHF and will need admission for oxygen support and IV diuresis.  The last echo that I can see is from 2017 where she had an EF of approximately 38%.     Doron Vences MD  02/14/22 1301       Doron Vences MD  02/14/22 1301

## 2022-02-14 NOTE — ED NOTES
This RN wearing all appropriate PPE during patient encounter. Hand hygiene performed before and during entering room.       Marcela Acosta, RN  02/14/22 [id number]

## 2022-02-15 LAB
ANION GAP SERPL CALCULATED.3IONS-SCNC: 10 MMOL/L (ref 5–15)
ANION GAP SERPL CALCULATED.3IONS-SCNC: 7.9 MMOL/L (ref 5–15)
BUN SERPL-MCNC: 15 MG/DL (ref 8–23)
BUN SERPL-MCNC: 15 MG/DL (ref 8–23)
BUN/CREAT SERPL: 15.6 (ref 7–25)
BUN/CREAT SERPL: 17 (ref 7–25)
CALCIUM SPEC-SCNC: 8.1 MG/DL (ref 8.6–10.5)
CALCIUM SPEC-SCNC: 8.6 MG/DL (ref 8.6–10.5)
CHLORIDE SERPL-SCNC: 101 MMOL/L (ref 98–107)
CHLORIDE SERPL-SCNC: 103 MMOL/L (ref 98–107)
CO2 SERPL-SCNC: 28.1 MMOL/L (ref 22–29)
CO2 SERPL-SCNC: 30 MMOL/L (ref 22–29)
CREAT SERPL-MCNC: 0.88 MG/DL (ref 0.57–1)
CREAT SERPL-MCNC: 0.96 MG/DL (ref 0.57–1)
DEPRECATED RDW RBC AUTO: 44.7 FL (ref 37–54)
ERYTHROCYTE [DISTWIDTH] IN BLOOD BY AUTOMATED COUNT: 13.1 % (ref 12.3–15.4)
GFR SERPL CREATININE-BSD FRML MDRD: 56 ML/MIN/1.73
GFR SERPL CREATININE-BSD FRML MDRD: 62 ML/MIN/1.73
GLUCOSE SERPL-MCNC: 102 MG/DL (ref 65–99)
GLUCOSE SERPL-MCNC: 98 MG/DL (ref 65–99)
HCT VFR BLD AUTO: 42.4 % (ref 34–46.6)
HGB BLD-MCNC: 13.3 G/DL (ref 12–15.9)
MCH RBC QN AUTO: 29.5 PG (ref 26.6–33)
MCHC RBC AUTO-ENTMCNC: 31.4 G/DL (ref 31.5–35.7)
MCV RBC AUTO: 94 FL (ref 79–97)
PLATELET # BLD AUTO: 196 10*3/MM3 (ref 140–450)
PMV BLD AUTO: 10.5 FL (ref 6–12)
POTASSIUM SERPL-SCNC: 3.5 MMOL/L (ref 3.5–5.2)
POTASSIUM SERPL-SCNC: 3.8 MMOL/L (ref 3.5–5.2)
QT INTERVAL: 414 MS
RBC # BLD AUTO: 4.51 10*6/MM3 (ref 3.77–5.28)
SODIUM SERPL-SCNC: 137 MMOL/L (ref 136–145)
SODIUM SERPL-SCNC: 143 MMOL/L (ref 136–145)
WBC NRBC COR # BLD: 8.37 10*3/MM3 (ref 3.4–10.8)

## 2022-02-15 PROCEDURE — 80048 BASIC METABOLIC PNL TOTAL CA: CPT | Performed by: NURSE PRACTITIONER

## 2022-02-15 PROCEDURE — 25010000002 FUROSEMIDE PER 20 MG: Performed by: INTERNAL MEDICINE

## 2022-02-15 PROCEDURE — 36415 COLL VENOUS BLD VENIPUNCTURE: CPT | Performed by: INTERNAL MEDICINE

## 2022-02-15 PROCEDURE — 80048 BASIC METABOLIC PNL TOTAL CA: CPT | Performed by: INTERNAL MEDICINE

## 2022-02-15 PROCEDURE — 99222 1ST HOSP IP/OBS MODERATE 55: CPT | Performed by: NURSE PRACTITIONER

## 2022-02-15 PROCEDURE — 85027 COMPLETE CBC AUTOMATED: CPT | Performed by: INTERNAL MEDICINE

## 2022-02-15 RX ORDER — MAGNESIUM SULFATE HEPTAHYDRATE 40 MG/ML
2 INJECTION, SOLUTION INTRAVENOUS AS NEEDED
Status: DISCONTINUED | OUTPATIENT
Start: 2022-02-15 | End: 2022-02-19 | Stop reason: HOSPADM

## 2022-02-15 RX ORDER — MAGNESIUM SULFATE HEPTAHYDRATE 40 MG/ML
4 INJECTION, SOLUTION INTRAVENOUS AS NEEDED
Status: DISCONTINUED | OUTPATIENT
Start: 2022-02-15 | End: 2022-02-19 | Stop reason: HOSPADM

## 2022-02-15 RX ORDER — POTASSIUM CHLORIDE 750 MG/1
40 TABLET, FILM COATED, EXTENDED RELEASE ORAL AS NEEDED
Status: DISCONTINUED | OUTPATIENT
Start: 2022-02-15 | End: 2022-02-19 | Stop reason: HOSPADM

## 2022-02-15 RX ORDER — POTASSIUM CHLORIDE 1.5 G/1.77G
40 POWDER, FOR SOLUTION ORAL AS NEEDED
Status: DISCONTINUED | OUTPATIENT
Start: 2022-02-15 | End: 2022-02-19 | Stop reason: HOSPADM

## 2022-02-15 RX ADMIN — POTASSIUM CHLORIDE 40 MEQ: 750 TABLET, EXTENDED RELEASE ORAL at 08:48

## 2022-02-15 RX ADMIN — FUROSEMIDE 40 MG: 10 INJECTION, SOLUTION INTRAMUSCULAR; INTRAVENOUS at 07:57

## 2022-02-15 RX ADMIN — FUROSEMIDE 40 MG: 10 INJECTION, SOLUTION INTRAMUSCULAR; INTRAVENOUS at 21:17

## 2022-02-15 RX ADMIN — METOPROLOL SUCCINATE 100 MG: 100 TABLET, EXTENDED RELEASE ORAL at 07:57

## 2022-02-15 RX ADMIN — METOPROLOL SUCCINATE 100 MG: 100 TABLET, EXTENDED RELEASE ORAL at 21:18

## 2022-02-15 RX ADMIN — RIVAROXABAN 20 MG: 20 TABLET, FILM COATED ORAL at 17:45

## 2022-02-15 NOTE — PLAN OF CARE
Goal Outcome Evaluation:  Plan of Care Reviewed With: patient           Outcome Summary: Cont to amol with Lasix.  Pt tolerating well.  Pt got up with Assist standby to BR.  Bm today.  Refused PT/OT.  VSS.  Will cont to monitor.

## 2022-02-15 NOTE — H&P
HISTORY AND PHYSICAL   [facility]        Date of Admission: 2022  Patient Identification:  Name: Unique Talavera  Age: 76 y.o.  Sex: female  :  1945  MRN: [id number]                     Primary Care Physician: Elvis Diaz MD    Chief Complaint:  76 year old female who presented to the emergency room with worsening weakness and shortness of breath; she has also had intermittent chest pain; she was hypoxic on presentation; denies fever or chills; she has a history of chf, cad and a fib and is followed by dr moody;     History of Present Illness:   As above    Past Medical History:  Past Medical History:   Diagnosis Date   • Acute on chronic congestive heart failure (HCC)    • Afib (HCC)    • Arthritis    • Atrial fibrillation status post cardioversion (HCC) 04/15/2016   • Cardiomyopathy (HCC)    • Chronic systolic congestive heart failure (HCC)    • Coronary artery disease    • Daytime sleepiness    • Depression    • Dizziness    • Hypertension    • Leukocytosis    • NICM (nonischemic cardiomyopathy) (HCC)    • Obesity     morbid   • Permanent atrial fibrillation (HCC)    • Persistent atrial fibrillation (HCC)    • Vertigo      Past Surgical History:  Past Surgical History:   Procedure Laterality Date   • CARDIAC CATHETERIZATION N/A 2016    Procedure: Left Heart Cath;  Surgeon: Jostin Arce MD;  Location: [facility] INVASIVE LOCATION;  Service:    • CARDIAC CATHETERIZATION N/A 2016    Procedure: Right Heart Cath;  Surgeon: Jostin Arce MD;  Location: [facility] INVASIVE LOCATION;  Service:    •  SECTION     •  SECTION     • KNEE SURGERY     • WRIST SURGERY        Home Meds:  Medications Prior to Admission   Medication Sig Dispense Refill Last Dose   • furosemide (LASIX) 40 MG tablet TAKE ONE TABLET BY MOUTH DAILY 90 tablet 1 2022 at Unknown time   • metoprolol succinate XL (TOPROL-XL) 100 MG 24 hr tablet Take 1 tablet by mouth 2  (Two) Times a Day. 180 tablet 3 2022 at Unknown time   • rivaroxaban (Xarelto) 20 MG tablet Take 1 tablet by mouth Daily With Dinner. 28 tablet 0 2022 at Unknown time       Allergies:  No Known Allergies  Immunizations:  Immunization History   Administered Date(s) Administered   • COVID-19 (PFIZER) PURPLE CAP 2021     Social History:   Social History     Social History Narrative   • Not on file     Social History     Socioeconomic History   • Marital status:    Tobacco Use   • Smoking status: Former Smoker   • Smokeless tobacco: Never Used   Vaping Use   • Vaping Use: Never used   Substance and Sexual Activity   • Alcohol use: Yes     Comment: occassionally /caffeine use   • Drug use: No   • Sexual activity: Defer       Family History:  Family History   Problem Relation Age of Onset   • Heart disease Mother    • Stroke Father         Review of Systems  See history of present illness and past medical history.  Patient denies headache, dizziness, syncope, falls, trauma, change in vision, change in hearing, change in taste, changes in weight, changes in appetite, focal weakness, numbness, or paresthesia.  Patient denies  palpitations,  cough, sinus pressure, rhinorrhea, epistaxis, hemoptysis, nausea, vomiting,hematemesis, diarrhea, constipation or hematchezia.  Denies cold or heat intolerance, polydipsia, polyuria, polyphagia. Denies hematuria, pyuria, dysuria, hesitancy, frequency or urgency. Denies consumption of raw and under cooked meats foods or change in water source.  Denies fever, chills, sweats, night sweats.  Denies missing any routine medications. Remainder of ROS is negative.    Objective:  T Max 24 hrs: Temp (24hrs), Av.2 °F (36.8 °C), Min:97.6 °F (36.4 °C), Max:98.7 °F (37.1 °C)    Vitals Ranges:   Temp:  [97.6 °F (36.4 °C)-98.7 °F (37.1 °C)] 98.7 °F (37.1 °C)  Heart Rate:  [] 76  Resp:  [20] 20  BP: (151-174)/() 155/96      Exam:  /96 (BP Location: Right arm,  "Patient Position: Lying)   Pulse 76   Temp 98.7 °F (37.1 °C) (Oral)   Resp 20   Ht 165.1 cm (65\")   Wt (!) 147 kg (324 lb 15.3 oz)   SpO2 93%   BMI 54.08 kg/m²     General Appearance:    Alert, cooperative, no distress, appears stated age   Head:    Normocephalic, without obvious abnormality, atraumatic   Eyes:    PERRL, conjunctivae/corneas clear, EOM's intact, both eyes   Ears:    Normal external ear canals, both ears   Nose:   Nares normal, septum midline, mucosa normal, no drainage    or sinus tenderness   Throat:   Lips, mucosa, and tongue normal   Neck:   Supple, symmetrical, trachea midline, no adenopathy;     thyroid:  no enlargement/tenderness/nodules; no carotid    bruit or JVD   Back:     Symmetric, no curvature, ROM normal, no CVA tenderness   Lungs:     Decreased breath sounds bilaterally, respirations unlabored   Chest Wall:    No tenderness or deformity    Heart:    Regular rate and rhythm, S1 and S2 normal, no murmur, rub   or gallop   Abdomen:     Soft, nontender, bowel sounds active all four quadrants,     no masses, no hepatomegaly, no splenomegaly   Extremities:   Extremities normal, atraumatic, no cyanosis or edema   Pulses:   2+ and symmetric all extremities   Skin:   Skin color, texture, turgor normal, no rashes or lesions   Lymph nodes:   Cervical, supraclavicular, and axillary nodes normal   Neurologic:   CNII-XII intact, normal strength, sensation intact throughout      .    Data Review:  Labs in chart were reviewed.  WBC   Date Value Ref Range Status   02/14/2022 9.08 3.40 - 10.80 10*3/mm3 Final     Hemoglobin   Date Value Ref Range Status   02/14/2022 14.5 12.0 - 15.9 g/dL Final     Hematocrit   Date Value Ref Range Status   02/14/2022 45.0 34.0 - 46.6 % Final     Platelets   Date Value Ref Range Status   02/14/2022 224 140 - 450 10*3/mm3 Final     Sodium   Date Value Ref Range Status   02/14/2022 140 136 - 145 mmol/L Final     Potassium   Date Value Ref Range Status   02/14/2022 " 4.4 3.5 - 5.2 mmol/L Final     Comment:     Specimen hemolyzed.  Results may be affected.     Chloride   Date Value Ref Range Status   02/14/2022 105 98 - 107 mmol/L Final     CO2   Date Value Ref Range Status   02/14/2022 20.0 (L) 22.0 - 29.0 mmol/L Final     BUN   Date Value Ref Range Status   02/14/2022 17 8 - 23 mg/dL Final     Creatinine   Date Value Ref Range Status   02/14/2022 1.09 (H) 0.57 - 1.00 mg/dL Final     Glucose   Date Value Ref Range Status   02/14/2022 101 (H) 65 - 99 mg/dL Final     Calcium   Date Value Ref Range Status   02/14/2022 8.7 8.6 - 10.5 mg/dL Final                Imaging Results (All)     Procedure Component Value Units Date/Time    XR Chest 1 View [[id number]] Collected: 02/14/22 1107     Updated: 02/14/22 1111    Narrative:      Portal chest x-ray     HISTORY: Generalized edema for several months. Worsening shortness of  breath. Previous diagnosis of heart failure.     TECHNIQUE: A single portable chest x-ray images correlated with chest  x-ray October 25, 2019.     FINDINGS: There is pronounced enlargement of the cardiac silhouette  which appears similar to that observed on the prior x-ray. Central  pulmonary vasculature is engorged. No interstitial edema is present. The  lungs appear clear and the costophrenic sulci are dry. There is no  pneumothorax.       Impression:      Cardiomegaly with pulmonary vascular engorgement. No  pulmonary interstitial edema or pneumonia is evident.     This report was finalized on 2/14/2022 11:08 AM by Dr. Lloyd Armstrong M.D.           Patient Active Problem List   Diagnosis Code   • Persistent atrial fibrillation ([facility]) I48.19   • Arthritis M19.90   • Morbid obesity ([facility]) E66.01   • NICM (nonischemic cardiomyopathy) ([facility]) I42.8   • Vertigo R42   • Daytime sleepiness R40.0   • Acute on chronic systolic congestive heart failure ([facility]) I50.23   • DNR (do not resuscitate) Z66   • Essential hypertension I10   • Acute on chronic congestive heart failure  ([facility]) I50.9       Assessment:  Active Hospital Problems    Diagnosis  POA   • Acute on chronic systolic congestive heart failure (HCC) [I50.9]  Yes      Resolved Hospital Problems   No resolved problems to display.   atrial fibrillation  Hypertension  Obesity  Acute hypoxic respiratory failure  cad    Plan:  Will continue diuresis  Ask cardiology to see her  Repeat troponin  Monitor on telemetry  DRoryw patient and nurse as well as ED provider    Marcia Martinez MD  2/14/2022  20:19 EST

## 2022-02-15 NOTE — SIGNIFICANT NOTE
02/15/22 1325   OTHER   Discipline physical therapist   Rehab Time/Intention   Session Not Performed patient/family declined evaluation  (pt declines need for PT, asked me not to return, will sign off)

## 2022-02-15 NOTE — PLAN OF CARE
Goal Outcome Evaluation:  Plan of Care Reviewed With: patient           Outcome Summary: Pt admitted to unit and resting in bed.  Pt seems anxious.  Francia saw pt in room and will place the appropriate orders.  VSS.  Will cont to monitor.

## 2022-02-15 NOTE — PROGRESS NOTES
Name: Unique Talavera ADMIT: 2022   : 1945  PCP: Elvis Diaz MD    MRN: [id number] LOS: 1 days   AGE/SEX: 76 y.o. female  ROOM: [facility]     Subjective   Subjective   Resting in bed, she is on room air and reports she feels better compared to yesterday. She reports she has been sleeping in her recliner but reports it is because her arthritis is bad not for breathing related issues.    Review of Systems   Constitutional: Negative for fatigue and fever.   HENT: Negative for sinus pressure and sore throat.    Eyes: Negative for pain and redness.   Respiratory: Positive for shortness of breath. Negative for cough.    Cardiovascular: Positive for leg swelling. Negative for chest pain and palpitations.   Gastrointestinal: Negative for abdominal pain, nausea and vomiting.   Musculoskeletal: Positive for arthralgias.   Skin: Negative for rash and wound.   Psychiatric/Behavioral: Negative for behavioral problems and dysphoric mood.        Objective   Objective   Vital Signs  Temp:  [97.6 °F (36.4 °C)-98.7 °F (37.1 °C)] 97.6 °F (36.4 °C)  Heart Rate:  [65-90] 90  Resp:  [18-20] 18  BP: (107-155)/(54-96) 107/70  SpO2:  [92 %-95 %] 92 %  on  Flow (L/min):  [2] 2;   Device (Oxygen Therapy): room air  Body mass index is 54.08 kg/m².  Physical Exam  Constitutional:       General: She is not in acute distress.     Appearance: Normal appearance. She is obese. She is not ill-appearing.   HENT:      Head: Normocephalic and atraumatic.      Nose: Nose normal.      Mouth/Throat:      Mouth: Mucous membranes are moist.      Pharynx: Oropharynx is clear.   Eyes:      Extraocular Movements: Extraocular movements intact.      Conjunctiva/sclera: Conjunctivae normal.      Pupils: Pupils are equal, round, and reactive to light.   Cardiovascular:      Rate and Rhythm: Normal rate. Rhythm irregular.      Pulses: Normal pulses.   Pulmonary:      Effort: Pulmonary effort is normal. No respiratory distress.      Breath sounds:  Normal breath sounds. No wheezing.   Abdominal:      General: Abdomen is flat. Bowel sounds are normal. There is no distension.      Palpations: Abdomen is soft.   Musculoskeletal:         General: No swelling or tenderness. Normal range of motion.      Cervical back: Normal range of motion and neck supple.      Right lower leg: No edema.      Left lower leg: No edema.      Comments: Patient is morbidly obese, no pitting edema   Skin:     General: Skin is warm and dry.   Neurological:      General: No focal deficit present.      Mental Status: She is alert and oriented to person, place, and time. Mental status is at baseline.   Psychiatric:         Mood and Affect: Mood normal.         Behavior: Behavior normal.         Thought Content: Thought content normal.         Judgment: Judgment normal.         Results Review     I reviewed the patient's new clinical results.  Results from last 7 days   Lab Units 02/15/22  0636 02/14/22  1039   WBC 10*3/mm3 8.37 9.08   HEMOGLOBIN g/dL 13.3 14.5   PLATELETS 10*3/mm3 196 224     Results from last 7 days   Lab Units 02/15/22  1139 02/15/22  0636 02/14/22  1039   SODIUM mmol/L 137 143 140   POTASSIUM mmol/L 3.8 3.5 4.4   CHLORIDE mmol/L 101 103 105   CO2 mmol/L 28.1 30.0* 20.0*   BUN mg/dL 15 15 17   CREATININE mg/dL 0.96 0.88 1.09*   GLUCOSE mg/dL 102* 98 101*   Estimated Creatinine Clearance: 72.1 mL/min (by C-G formula based on SCr of 0.96 mg/dL).  Results from last 7 days   Lab Units 02/14/22  1039   ALBUMIN g/dL 4.00   BILIRUBIN mg/dL 0.7   ALK PHOS U/L 112   AST (SGOT) U/L 27   ALT (SGPT) U/L 11     Results from last 7 days   Lab Units 02/15/22  1139 02/15/22  0636 02/14/22  1039   CALCIUM mg/dL 8.6 8.1* 8.7   ALBUMIN g/dL  --   --  4.00       COVID19   Date Value Ref Range Status   02/14/2022 Not Detected Not Detected - Ref. Range Final     No results found for: HGBA1C, POCGLU    XR Chest 1 View  Narrative: Portal chest x-ray     HISTORY: Generalized edema for several  months. Worsening shortness of  breath. Previous diagnosis of heart failure.     TECHNIQUE: A single portable chest x-ray images correlated with chest  x-ray October 25, 2019.     FINDINGS: There is pronounced enlargement of the cardiac silhouette  which appears similar to that observed on the prior x-ray. Central  pulmonary vasculature is engorged. No interstitial edema is present. The  lungs appear clear and the costophrenic sulci are dry. There is no  pneumothorax.     Impression: Cardiomegaly with pulmonary vascular engorgement. No  pulmonary interstitial edema or pneumonia is evident.     This report was finalized on 2/14/2022 11:08 AM by Dr. Lloyd Armstrong M.D.       Scheduled Medications  furosemide, 40 mg, Intravenous, Q12H  metoprolol succinate XL, 100 mg, Oral, BID  rivaroxaban, 20 mg, Oral, Daily With Dinner    Infusions   Diet  Diet Regular; Cardiac       Assessment/Plan     Active Hospital Problems    Diagnosis  POA   • **Acute on chronic congestive heart failure (HCC) [I50.9]  Yes   • Essential hypertension [I10]  Yes   • NICM (nonischemic cardiomyopathy) ([facility]) [I42.8]  Yes   • Arthritis [M19.90]  Yes   • Morbid obesity ([facility]) [E66.01]  Yes   • Persistent atrial fibrillation ([facility]) [I48.19]  Yes      Resolved Hospital Problems   No resolved problems to display.       76 y.o. female admitted with Acute on chronic congestive heart failure (HCC).    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  - currently on IV diuretics, cardiology is following, she declined repeat echo  - continue daily weights, strict Is/Os, monitor Cr/electrolytes while on iv diuretics    Permanent atrial fibrillation  - Continue metoprolol and xarelto  - rate controlled    HTN  - Continue metoprolol    Morbid obesity  - complicates all aspect of her care; recommend weight loss  - patient declined PT and OT    · Xarelto (home med) for DVT prophylaxis.  · DNR.  · Discussed with patient, nursing staff and care team on  multidisciplinary rounds.  · Anticipate discharge home with family in 2-3 days.      Maureen Flores MD  [facility]  02/15/22  16:54 EST    Patient was wearing facemask when I entered the room and throughout our encounter.  I wore protective equipment throughout this patient encounter including a face mask, gloves and protective eyewear.  Hand hygiene was performed before donning protective equipment and after removal when leaving the room.

## 2022-02-15 NOTE — CASE MANAGEMENT/SOCIAL WORK
Continued Stay Note  [facility]     Patient Name: Unique Talavera  MRN: [id number]  Today's Date: 2/15/2022    Admit Date: 2/14/2022     Discharge Plan     Row Name 02/15/22 0842       Plan    Plan Plan home with family assistance.    EMILY Nolan RN    Patient/Family in Agreement with Plan yes    Plan Comments FACE SHEET VERIFIED/ IM LETTER SIGNED.  Spoke with pt at bedside.  Pt's PCP is Dr. Elvis Diaz.  Pt's next of kin are her sons (Marcio Talavera [phone] and Damien Talavera [phone]).  Pt lives alone in a single story house.  Pt is independent with ADLs.  Pt 's sons assist pt with shopping and cleaning.   Pt gets her prescriptions at [facility] in [place].  Pt denies any issues affording medications.  Pt is not current with HH.  Pt has not been in SNF.  Pt denies any discharge needs.  Pt states her son will transport her home and assist her at home as needed.   Plan home with family assistance.    EMILY Nolan RN               Discharge Codes    No documentation.                     Patti Nolan RN

## 2022-02-15 NOTE — SIGNIFICANT NOTE
"   02/15/22 1045   OTHER   Discipline occupational therapist   Rehab Time/Intention   Session Not Performed patient/family declined evaluation; other (see comments)  (OT checked on pt, pt stating \"I can do that myself,\" and \"I don't need you to come back.\" OT encouraged pt and educated pt on the role of OT, however pt remained declining, RN aware this date. OT to sign off.)     "

## 2022-02-15 NOTE — PLAN OF CARE
Goal Outcome Evaluation:               Pt resting in bed quietly. Denies pain at rest. On 2 L NC. When on room air, patient was satting 85% on monitor. >90% on 2 L. Pt can turn self but has to be encouraged. Says she walks at home but refusing to get out of bed here. BLE edema. Pt c/o pain in BLE when touched or moved. Purewick in place for strict I&o after lasix given.

## 2022-02-15 NOTE — CASE MANAGEMENT/SOCIAL WORK
Discharge Planning Assessment  [facility]     Patient Name: Unique Talavera  MRN: [id number]  Today's Date: 2/15/2022    Admit Date: 2/14/2022     Discharge Needs Assessment     Row Name 02/15/22 0839       Living Environment    Lives With alone    Current Living Arrangements home/apartment/condo    Primary Care Provided by self    Provides Primary Care For no one    Family Caregiver if Needed child(renny), adult    Family Caregiver Names Sons  (Marcio Talavera [phone] and Damien Talavera [phone])    Quality of Family Relationships helpful; involved; supportive    Able to Return to Prior Arrangements yes    Living Arrangement Comments Pt lives alone in a single story house.       Resource/Environmental Concerns    Resource/Environmental Concerns none    Transportation Concerns car, none       Transition Planning    Patient/Family Anticipates Transition to home with family    Patient/Family Anticipated Services at Transition none    Transportation Anticipated family or friend will provide       Discharge Needs Assessment    Readmission Within the Last 30 Days no previous admission in last 30 days    Equipment Currently Used at Home bath bench; cane, quad    Concerns to be Addressed no discharge needs identified; denies needs/concerns at this time    Anticipated Changes Related to Illness none    Equipment Needed After Discharge bath bench; cane, quad               Discharge Plan     Row Name 02/15/22 0842       Plan    Plan Plan home with family assistance.    EMILY Nolan RN    Patient/Family in Agreement with Plan yes    Plan Comments FACE SHEET VERIFIED/ IM LETTER SIGNED.  Spoke with pt at bedside.  Pt's PCP is Dr. Elvis Diaz.  Pt's next of kin are her sons (Marcio Talavera [phone] and Damien Talavera [phone]).  Pt lives alone in a single story house.  Pt is independent with ADLs.  Pt 's sons assist pt with shopping and cleaning.   Pt gets her prescriptions at [facility] in [place].  Pt denies any  issues affording medications.  Pt is not current with HH.  Pt has not been in SNF.  Pt denies any discharge needs.  Pt states her son will transport her home and assist her at home as needed.   Plan home with family assistance.    EMILY Nolan RN              Continued Care and Services - Admitted Since 2/14/2022    Coordination has not been started for this encounter.          Demographic Summary     Row Name 02/15/22 0838       General Information    Admission Type inpatient    Arrived From emergency department    Required Notices Provided Important Message from Medicare    Referral Source admission list    Reason for Consult discharge planning    Preferred Language English     Used During This Interaction no               Functional Status     Row Name 02/15/22 0838       Functional Status    Usual Activity Tolerance good    Current Activity Tolerance moderate       Functional Status, IADL    Medications independent    Meal Preparation independent    Housekeeping assistive person    Laundry assistive person    Shopping assistive person       Mental Status    General Appearance WDL WDL               Psychosocial    No documentation.                Abuse/Neglect    No documentation.                Legal    No documentation.                Substance Abuse    No documentation.                Patient Forms    No documentation.                   Patti Nolan, ELENITA

## 2022-02-15 NOTE — CONSULTS
Electrophysiology Hospital Consult            Patient Name: Unique Talavera  Age/Sex: 76 y.o. female  : 1945  MRN: [id number]    Date of Admission: 2022  Date of Encounter Visit: 02/15/22  Encounter Provider: SADAF Quintero  Referring Provider: Marcia Martinez MD  Place of Service: [facility] CARDIOLOGY  Patient Care Team:  Elvis Diaz MD as PCP - General (Family Medicine)      Subjective:   EP Consultation for: Congestive heart failure, perm afib    Chief Complaint: Progressive dyspnea, chest pain and weakness.     History of Present Illness:  Unique Talavera is a 76 y.o. female who follows with Dr. Martines for NICM and perm afib. She also has HTN, chronic back pain and is morbidly obese.     She has not been seen in the office since 2021, she did do a telehealth visit in September and was doing okay at that time.     Presented to the ED with complaints of generalized weakness, intermittent chest pain and progressive shortness of breath. Sats in the 80's, proBNP 2372, CMP and CBC unremarkable. CXR showed pul vascular engorgement and cardiomegaly. Trop negative x 2.     She was given IV furosemide with good results per I/O. Cardiology has been ask to see.     She feels better this morning, breathing improved, no chest pain.     We discussed her diet at home and she says she does click list and sometimes you don't get what you order because they are out and you have to take what they give you or have to choose what they do have---suspect part of her current volume overload status is secondary to dietary indiscretions, not completely under her control.     Past Medical History:  Past Medical History:   Diagnosis Date    Acute on chronic congestive heart failure (HCC)     Afib (HCC)     Arthritis     Atrial fibrillation status post cardioversion (HCC) 04/15/2016    Cardiomyopathy (HCC)     Chronic systolic congestive heart failure (HCC)     Coronary artery  disease     Daytime sleepiness     Depression     Dizziness     Hypertension     Leukocytosis     NICM (nonischemic cardiomyopathy) (HCC)     Obesity     morbid    Permanent atrial fibrillation (HCC)     Persistent atrial fibrillation (HCC)     Vertigo        Past Surgical History:   Procedure Laterality Date    CARDIAC CATHETERIZATION N/A 2016    Procedure: Left Heart Cath;  Surgeon: Jostin Arce MD;  Location:  [facility];  Service:     CARDIAC CATHETERIZATION N/A 2016    Procedure: Right Heart Cath;  Surgeon: Jostin Arce MD;  Location:  [facility];  Service:      SECTION       SECTION      KNEE SURGERY      WRIST SURGERY         Home Medications:   Medications Prior to Admission   Medication Sig Dispense Refill Last Dose    furosemide (LASIX) 40 MG tablet TAKE ONE TABLET BY MOUTH DAILY 90 tablet 1 2022 at Unknown time    metoprolol succinate XL (TOPROL-XL) 100 MG 24 hr tablet Take 1 tablet by mouth 2 (Two) Times a Day. 180 tablet 3 2022 at Unknown time    rivaroxaban (Xarelto) 20 MG tablet Take 1 tablet by mouth Daily With Dinner. 28 tablet 0 2022 at Unknown time       Allergies:  No Known Allergies    Past Social History:  Social History     Socioeconomic History    Marital status:    Tobacco Use    Smoking status: Former Smoker    Smokeless tobacco: Never Used   Vaping Use    Vaping Use: Never used   Substance and Sexual Activity    Alcohol use: Yes     Comment: occassionally /caffeine use    Drug use: No    Sexual activity: Defer       Past Family History:  Family History   Problem Relation Age of Onset    Heart disease Mother     Stroke Father        Review of Systems: All systems reviewed. Pertinent positives identified in HPI. All other systems are negative.     14 point ROS was performed and is negative except as outlined in HPI.     Objective:     Objective:  Vital Signs (last 24 hours)           0700  02/15 0659 02/15 0700  02/15 0819   Most Recent      Temp (°F) 97.6 -  98.7      98.1     98.1 (36.7) 02/15 0734    Heart Rate 68 -  102      65     65 02/15 0734    Resp   20      20     20 02/15 0734    /54 -  174/106      130/77     130/77 02/15 0734    SpO2 (%) 83 -  96      95     95 02/15 0734          Temp:  [97.6 °F (36.4 °C)-98.7 °F (37.1 °C)] 98.1 °F (36.7 °C)  Heart Rate:  [] 65  Resp:  [20] 20  BP: (112-174)/() 130/77  Body mass index is 54.08 kg/m².        Physical Exam:     General Appearance: No acute distress, morbidly obese.  Eyes: Conjunctiva and lids: No erythema, swelling, or discharge. Sclera non-icteric.   HENT: Atraumatic, normocephalic. External eyes, ears, and nose normal.   Respiratory: No signs of respiratory distress. Respiration rhythm and depth normal.   Clear to auscultation. No rales, crackles, rhonchi, or wheezing auscultated.   Cardiovascular:  Heart Rate and Rhythm: Irregularly, irregular. Heart Sounds: S1 and S2.  Murmurs: No murmurs noted. No rubs, thrills, or gallops.   Lower Extremities: Difficult to evaluate due to size but I do not appreciate any LE edema.   Gastrointestinal:  Abdomen obese, non-tender.  Musculoskeletal: Normal movement of extremities  Skin: Warm and dry.   Psychiatric: Patient alert and oriented to person, place, and time. Speech and behavior appropriate. Normal mood and affect.    Labs:   Lab Review:     Results from last 7 days   Lab Units 02/15/22  0636 02/14/22  1039   SODIUM mmol/L 143 140   POTASSIUM mmol/L 3.5 4.4   CHLORIDE mmol/L 103 105   CO2 mmol/L 30.0* 20.0*   BUN mg/dL 15 17   CREATININE mg/dL 0.88 1.09*   GLUCOSE mg/dL 98 101*   CALCIUM mg/dL 8.1* 8.7   AST (SGOT) U/L  --  27   ALT (SGPT) U/L  --  11     Results from last 7 days   Lab Units 02/14/22  2131 02/14/22  1039   TROPONIN T ng/mL <0.010 <0.010     Results from last 7 days   Lab Units 02/15/22  0636   WBC 10*3/mm3 8.37   HEMOGLOBIN g/dL 13.3   HEMATOCRIT % 42.4  "  PLATELETS 10*3/mm3 196                     Results from last 7 days   Lab Units 22  1039   PROBNP pg/mL 2,372.0*       Imagin/2022 CXR:      IMPRESSION:  Cardiomegaly with pulmonary vascular engorgement. No  pulmonary interstitial edema or pneumonia is evident.     This report was finalized on 2022 11:08 AM by Dr. Lloyd Armstrong M.D.      2017 Echo:    Interpretation Summary    Left ventricular systolic function is moderately decreased. Calculated EF = 36.9%. Estimated EF was in agreement with the calculated EF. There is left ventricular global hypokinesis noted. The left ventricular cavity is moderately dilated. Left ventricular diastolic was unable to be assessed.  Right ventricular cavity is mildly dilated. Mildly reduced right ventricular systolic function noted.  Left atrial cavity size is moderately dilated.  Estimated right ventricular systolic pressure from tricuspid regurgitation is mildly elevated (35-45 mmHg). Calculated right ventricular systolic pressure from tricuspid regurgitation is 37 mmHg.      EKG:             I personally viewed and interpreted the patient's EKG/Telemetry tracings.    Assessment:       Acute on chronic congestive heart failure (HCC)        Plan:      --Acute on chronic suspected combined systolic and diastolic heart failure---responding well to IV diuretics, suspect occurred due to dietary indiscretions --not completely under her control. Continue IV diuretics for now.     --Morbidly obese complicating all aspects of care    --Perm afib, HR well controlled on metoprolol. Rivaroxaban for AC.     --NICLENO, echo  EF 36%---she does not want a repeat echo---says \"it would not change anything\" and I agree, exam unchanged from past, no new murmurs and she is responding well to IV diuretics.     --Chest pain, resolved, trop negative x 2, EKG no ischemic changes. No further work up necessary at this time.     Will continue to follow along.     Thank you for " allowing me to participate in the care of Unique Talavera. Feel free to contact me directly with any further questions or concerns.    SADAF Quintero  [facility]  02/15/22  08:19 EST

## 2022-02-16 LAB
ANION GAP SERPL CALCULATED.3IONS-SCNC: 7 MMOL/L (ref 5–15)
BUN SERPL-MCNC: 16 MG/DL (ref 8–23)
BUN/CREAT SERPL: 18 (ref 7–25)
CALCIUM SPEC-SCNC: 8.6 MG/DL (ref 8.6–10.5)
CHLORIDE SERPL-SCNC: 102 MMOL/L (ref 98–107)
CO2 SERPL-SCNC: 34 MMOL/L (ref 22–29)
CREAT SERPL-MCNC: 0.89 MG/DL (ref 0.57–1)
GFR SERPL CREATININE-BSD FRML MDRD: 62 ML/MIN/1.73
GLUCOSE SERPL-MCNC: 99 MG/DL (ref 65–99)
POTASSIUM SERPL-SCNC: 3.6 MMOL/L (ref 3.5–5.2)
SODIUM SERPL-SCNC: 143 MMOL/L (ref 136–145)

## 2022-02-16 PROCEDURE — 25010000002 FUROSEMIDE PER 20 MG: Performed by: INTERNAL MEDICINE

## 2022-02-16 PROCEDURE — 80048 BASIC METABOLIC PNL TOTAL CA: CPT | Performed by: NURSE PRACTITIONER

## 2022-02-16 PROCEDURE — 99232 SBSQ HOSP IP/OBS MODERATE 35: CPT | Performed by: NURSE PRACTITIONER

## 2022-02-16 RX ADMIN — FUROSEMIDE 40 MG: 10 INJECTION, SOLUTION INTRAMUSCULAR; INTRAVENOUS at 09:05

## 2022-02-16 RX ADMIN — RIVAROXABAN 20 MG: 20 TABLET, FILM COATED ORAL at 17:18

## 2022-02-16 RX ADMIN — METOPROLOL SUCCINATE 100 MG: 100 TABLET, EXTENDED RELEASE ORAL at 09:05

## 2022-02-16 RX ADMIN — FUROSEMIDE 40 MG: 10 INJECTION, SOLUTION INTRAMUSCULAR; INTRAVENOUS at 21:12

## 2022-02-16 RX ADMIN — METOPROLOL SUCCINATE 100 MG: 100 TABLET, EXTENDED RELEASE ORAL at 21:12

## 2022-02-16 NOTE — PLAN OF CARE
"Goal Outcome Evaluation:         Pt resting in bed. Argumentative at times w staff. Pt refusing the wear o2 sensor, as well as nasal cannula. Education provided. Pt states she wants to make the \"most of her time in the hospital\" and thinks she should be getting more lasix. RN suggests pt to discuss w MD tomorrow.    No c/o pain, nausea or SOA voiced. No s/s of distress cont to monitor.        "

## 2022-02-16 NOTE — PLAN OF CARE
Goal Outcome Evaluation:  Plan of Care Reviewed With: patient           Outcome Summary: Pt was spoken to by Card and Hosp that she needs to wear her Oxygen.  No c/o pain or nausea.  Pt has a very good appetite and loves our food.  Possible transition to PO diuretic tomorrow.  VSS.  Will cont to monitor.

## 2022-02-16 NOTE — PROGRESS NOTES
Electrophysiology Follow-Up Note      Patient Name: Unique Talavera  Age/Sex: 76 y.o. female  : 1945  MRN: [id number]      Day of Service: 22       Chief Complaint/Follow-up: CHF, perm afib    S: Breathing better, no chest pain      Temp:  [97.6 °F (36.4 °C)-98.4 °F (36.9 °C)] 98.4 °F (36.9 °C)  Heart Rate:  [70-90] 84  Resp:  [18] 18  BP: (107-118)/(58-76) 115/58     PHYSICAL EXAM:    General Appearance: No acute distress, morbidly obese  Eyes: Conjunctiva and lids: No erythema, swelling, or discharge. Sclera non-icteric.   HENT: Atraumatic, normocephalic. External eyes, ears, and nose normal.   Respiratory: No signs of respiratory distress. Respiration rhythm and depth normal. Lungs clear.  Cardiovascular:  Heart Rate and Rhythm: Irregularly, irregular.  Heart Sounds: S1 and S2.  Murmurs: No murmurs noted. No rubs, thrills, or gallops.   Lower Extremities: Difficulty to assess due to morbid obesity but no pitting edema.   Gastrointestinal:  Abdomen obese,   Skin: Warm and dry.   Psychiatric: Patient alert and oriented to person, place, and time. Speech and behavior appropriate. Normal mood and affect.       ECG/TELE:           Results from last 7 days   Lab Units 02/15/22  1139 02/15/22  0636 02/15/22  0636 22  1039 22  1039   SODIUM mmol/L 137  --  143  --  140   POTASSIUM mmol/L 3.8  --  3.5  --  4.4   CHLORIDE mmol/L 101  --  103  --  105   CO2 mmol/L 28.1  --  30.0*  --  20.0*   BUN mg/dL 15  --  15  --  17   CREATININE mg/dL 0.96  --  0.88  --  1.09*   GLUCOSE mg/dL 102*   < > 98   < > 101*   CALCIUM mg/dL 8.6  --  8.1*  --  8.7    < > = values in this interval not displayed.     Results from last 7 days   Lab Units 02/15/22  0636 22  1039   WBC 10*3/mm3 8.37 9.08   HEMOGLOBIN g/dL 13.3 14.5   HEMATOCRIT % 42.4 45.0   PLATELETS 10*3/mm3 196 224         Results from last 7 days   Lab Units 22  2131 22  1039   TROPONIN T ng/mL <0.010 <0.010        "        Current Medications:   Scheduled Meds:furosemide, 40 mg, Intravenous, Q12H  metoprolol succinate XL, 100 mg, Oral, BID  rivaroxaban, 20 mg, Oral, Daily With Dinner            Acute on chronic congestive heart failure (HCC)    Persistent atrial fibrillation (HCC)    Arthritis    Morbid obesity (HCC)    NICM (nonischemic cardiomyopathy) (HCC)    Essential hypertension       Plan:        --Acute on chronic suspected combined systolic and diastolic heart failure---responding well to IV diuretics, suspect occurred due to dietary indiscretions --possibly not completely under her control but she has a history of dietary indiscretions and not receptive to education.      --Morbidly obese complicating all aspects of care     --Perm afib, HR well controlled on metoprolol. Rivaroxaban for AC.      --NICM, echo 2017 EF 36%---she does not want a repeat echo---says \"it would not change anything\" and I agree, exam unchanged from past, no new murmurs and she is responding well to IV diuretics.      --Chest pain, resolved, trop negative x 2, EKG no ischemic changes. No further work up necessary at this time.     --Persistent hypoxia---she is on oxygen---yesterday am, sats were mid 90's and so we tried her off oxygen---discussed with RN---when sats dropped she refused to wear oxygen or sat monitor---said cardiology told her she did not have to wear it--of course not the case----discussed with her today, she is currently on oxygen and I suspect despite diuresing her that she may end up needing it at dc.     Will continue IV diuretics 1 more day, plan on trying to transition to oral tomorrow if continue to diuresis well---she has put out almost 6 liters per the I/O but weight is unchanged, no sure these are accurate.        Nazanin Paige, APRN  02/16/22  1530  "

## 2022-02-16 NOTE — PROGRESS NOTES
Name: Unique Talavera ADMIT: 2022   : 1945  PCP: Elvis Diaz MD    MRN: [id number] LOS: 2 days   AGE/SEX: 76 y.o. female  ROOM: [facility]     Subjective   Subjective   Resting in bed, overnight she refused to wear both her oxygen sensor and her supplemental oxygen.  This morning on my arrival she is 82% on room air and refusing to wear her oxygen.  She informs me that cardiology told her it was not necessary for her to wear oxygen which I doubt.  After some education about the effects of chronic hypoxia the patient was agreeable to wearing her oxygen, 2 L nasal cannula was placed on the patient and her oxygen saturation improved to 91%.    Review of Systems   Constitutional: Negative for fatigue and fever.   HENT: Negative for sinus pressure and sore throat.    Eyes: Negative for pain and redness.   Respiratory: Positive for shortness of breath. Negative for cough.    Cardiovascular: Positive for leg swelling. Negative for chest pain and palpitations.   Gastrointestinal: Negative for abdominal pain, nausea and vomiting.   Musculoskeletal: Positive for arthralgias.   Skin: Negative for rash and wound.   Psychiatric/Behavioral: Negative for behavioral problems and dysphoric mood.        Objective   Objective   Vital Signs  Temp:  [97.6 °F (36.4 °C)-98.4 °F (36.9 °C)] 98.4 °F (36.9 °C)  Heart Rate:  [70-90] 84  Resp:  [18] 18  BP: (107-118)/(58-76) 115/58  SpO2:  [90 %-92 %] 90 %  on   ;   Device (Oxygen Therapy): room air  Body mass index is 54.04 kg/m².  Physical Exam  Constitutional:       General: She is not in acute distress.     Appearance: Normal appearance. She is obese. She is not ill-appearing.   HENT:      Head: Normocephalic and atraumatic.      Nose: Nose normal.      Mouth/Throat:      Mouth: Mucous membranes are moist.      Pharynx: Oropharynx is clear.   Eyes:      Extraocular Movements: Extraocular movements intact.      Conjunctiva/sclera: Conjunctivae normal.      Pupils: Pupils are  equal, round, and reactive to light.   Cardiovascular:      Rate and Rhythm: Normal rate. Rhythm irregular.      Pulses: Normal pulses.   Pulmonary:      Effort: Pulmonary effort is normal. No respiratory distress.      Breath sounds: Normal breath sounds. No wheezing.   Abdominal:      General: Abdomen is flat. Bowel sounds are normal. There is no distension.      Palpations: Abdomen is soft.   Musculoskeletal:         General: No swelling or tenderness. Normal range of motion.      Cervical back: Normal range of motion and neck supple.      Right lower leg: No edema.      Left lower leg: No edema.      Comments: Patient is morbidly obese, no pitting edema   Skin:     General: Skin is warm and dry.   Neurological:      General: No focal deficit present.      Mental Status: She is alert and oriented to person, place, and time. Mental status is at baseline.   Psychiatric:         Mood and Affect: Mood normal.         Behavior: Behavior normal.         Thought Content: Thought content normal.         Judgment: Judgment normal.         Results Review     I reviewed the patient's new clinical results.  Results from last 7 days   Lab Units 02/15/22  0636 02/14/22  1039   WBC 10*3/mm3 8.37 9.08   HEMOGLOBIN g/dL 13.3 14.5   PLATELETS 10*3/mm3 196 224     Results from last 7 days   Lab Units 02/16/22  0623 02/15/22  1139 02/15/22  0636 02/14/22  1039   SODIUM mmol/L 143 137 143 140   POTASSIUM mmol/L 3.6 3.8 3.5 4.4   CHLORIDE mmol/L 102 101 103 105   CO2 mmol/L 34.0* 28.1 30.0* 20.0*   BUN mg/dL 16 15 15 17   CREATININE mg/dL 0.89 0.96 0.88 1.09*   GLUCOSE mg/dL 99 102* 98 101*   Estimated Creatinine Clearance: 77.7 mL/min (by C-G formula based on SCr of 0.89 mg/dL).  Results from last 7 days   Lab Units 02/14/22  1039   ALBUMIN g/dL 4.00   BILIRUBIN mg/dL 0.7   ALK PHOS U/L 112   AST (SGOT) U/L 27   ALT (SGPT) U/L 11     Results from last 7 days   Lab Units 02/16/22  0623 02/15/22  1139 02/15/22  0636 02/14/22  1039    CALCIUM mg/dL 8.6 8.6 8.1* 8.7   ALBUMIN g/dL  --   --   --  4.00       COVID19   Date Value Ref Range Status   02/14/2022 Not Detected Not Detected - Ref. Range Final     No results found for: HGBA1C, POCGLU    XR Chest 1 View  Narrative: Portal chest x-ray     HISTORY: Generalized edema for several months. Worsening shortness of  breath. Previous diagnosis of heart failure.     TECHNIQUE: A single portable chest x-ray images correlated with chest  x-ray October 25, 2019.     FINDINGS: There is pronounced enlargement of the cardiac silhouette  which appears similar to that observed on the prior x-ray. Central  pulmonary vasculature is engorged. No interstitial edema is present. The  lungs appear clear and the costophrenic sulci are dry. There is no  pneumothorax.     Impression: Cardiomegaly with pulmonary vascular engorgement. No  pulmonary interstitial edema or pneumonia is evident.     This report was finalized on 2/14/2022 11:08 AM by Dr. Lloyd Armstrong M.D.       Scheduled Medications  furosemide, 40 mg, Intravenous, Q12H  metoprolol succinate XL, 100 mg, Oral, BID  rivaroxaban, 20 mg, Oral, Daily With Dinner    Infusions   Diet  Diet Regular; Cardiac       Assessment/Plan     Active Hospital Problems    Diagnosis  POA   • **Acute on chronic congestive heart failure (HCC) [I50.9]  Yes   • Essential hypertension [I10]  Yes   • NICM (nonischemic cardiomyopathy) ([facility]) [I42.8]  Yes   • Arthritis [M19.90]  Yes   • Morbid obesity ([facility]) [E66.01]  Yes   • Persistent atrial fibrillation ([facility]) [I48.19]  Yes      Resolved Hospital Problems   No resolved problems to display.       76 y.o. female admitted with Acute on chronic congestive heart failure (HCC).    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  - currently on IV diuretics, cardiology is following, she declined repeat echo  - continue daily weights, strict Is/Os, monitor Cr/electrolytes while on iv diuretics  -Significantly hypoxic to the  low 80%'s when off of oxygen, improved to 91% on 2 L, hopefully supplemental oxygen can continue to be weaned as she continues to diurese    Permanent atrial fibrillation  - Continue metoprolol and xarelto  - rate controlled    HTN  - Continue metoprolol    Morbid obesity  - complicates all aspect of her care; recommend weight loss  - patient declined PT and OT    · Xarelto (home med) for DVT prophylaxis.  · DNR.  · Discussed with patient, family, nursing staff and care team on multidisciplinary rounds.  · Anticipate discharge home with family in 2-3 days.      Maureen Flores MD  [facility]  02/16/22  11:56 EST    Patient was wearing facemask when I entered the room and throughout our encounter.  I wore protective equipment throughout this patient encounter including a face mask, gloves and protective eyewear.  Hand hygiene was performed before donning protective equipment and after removal when leaving the room.

## 2022-02-17 LAB
ANION GAP SERPL CALCULATED.3IONS-SCNC: 14.1 MMOL/L (ref 5–15)
BUN SERPL-MCNC: 18 MG/DL (ref 8–23)
BUN/CREAT SERPL: 17.6 (ref 7–25)
CALCIUM SPEC-SCNC: 8.6 MG/DL (ref 8.6–10.5)
CHLORIDE SERPL-SCNC: 101 MMOL/L (ref 98–107)
CO2 SERPL-SCNC: 25.9 MMOL/L (ref 22–29)
CREAT SERPL-MCNC: 1.02 MG/DL (ref 0.57–1)
GFR SERPL CREATININE-BSD FRML MDRD: 53 ML/MIN/1.73
GLUCOSE SERPL-MCNC: 101 MG/DL (ref 65–99)
POTASSIUM SERPL-SCNC: 4 MMOL/L (ref 3.5–5.2)
SODIUM SERPL-SCNC: 141 MMOL/L (ref 136–145)

## 2022-02-17 PROCEDURE — 80048 BASIC METABOLIC PNL TOTAL CA: CPT | Performed by: NURSE PRACTITIONER

## 2022-02-17 PROCEDURE — 99232 SBSQ HOSP IP/OBS MODERATE 35: CPT | Performed by: NURSE PRACTITIONER

## 2022-02-17 PROCEDURE — 25010000002 FUROSEMIDE PER 20 MG: Performed by: INTERNAL MEDICINE

## 2022-02-17 RX ORDER — TORSEMIDE 20 MG/1
20 TABLET ORAL
Status: DISCONTINUED | OUTPATIENT
Start: 2022-02-17 | End: 2022-02-19 | Stop reason: HOSPADM

## 2022-02-17 RX ADMIN — FUROSEMIDE 40 MG: 10 INJECTION, SOLUTION INTRAMUSCULAR; INTRAVENOUS at 09:05

## 2022-02-17 RX ADMIN — METOPROLOL SUCCINATE 100 MG: 100 TABLET, EXTENDED RELEASE ORAL at 21:08

## 2022-02-17 RX ADMIN — METOPROLOL SUCCINATE 100 MG: 100 TABLET, EXTENDED RELEASE ORAL at 09:05

## 2022-02-17 RX ADMIN — TORSEMIDE 20 MG: 20 TABLET ORAL at 17:41

## 2022-02-17 RX ADMIN — RIVAROXABAN 20 MG: 20 TABLET, FILM COATED ORAL at 17:41

## 2022-02-17 NOTE — PROGRESS NOTES
Electrophysiology Follow-Up Note      Patient Name: Unique Talavera  Age/Sex: 76 y.o. female  : 1945  MRN: [id number]      Day of Service: 22       Chief Complaint/Follow-up: CHF, perm afib    S: No complaints of shortness of breath, but says she knows her body and she still had excess fluid.       Temp:  [97.6 °F (36.4 °C)-98.4 °F (36.9 °C)] 98.3 °F (36.8 °C)  Heart Rate:  [73-89] 75  Resp:  [18] 18  BP: (105-130)/(63-89) 105/63     PHYSICAL EXAM:    General Appearance: No acute distress, morbidly obese  Eyes: Conjunctiva and lids: No erythema, swelling, or discharge. Sclera non-icteric.   HENT: Atraumatic, normocephalic. External eyes, ears, and nose normal.   Respiratory: No signs of respiratory distress. Respiration rhythm and depth normal.   Clear to auscultation. No rales, crackles, rhonchi, or wheezing auscultated.   Cardiovascular:  Heart Rate and Rhythm: Irregularly, irregular. Heart Sounds: S1 and S2.   Murmurs: No murmurs noted. No rubs, thrills, or gallops.   Lower Extremities: Difficult to assess but no pitting edema.   Gastrointestinal:  Abdomen obese.  Skin: Warm and dry.   Psychiatric: Patient alert and oriented to person, place, and time. Speech and behavior appropriate. Normal mood and affect.       ECG/TELE:           Results from last 7 days   Lab Units 22  0623 02/15/22  1139 02/15/22  1139 02/15/22  0636 02/15/22  0636   SODIUM mmol/L 143  --  137  --  143   POTASSIUM mmol/L 3.6  --  3.8  --  3.5   CHLORIDE mmol/L 102  --  101  --  103   CO2 mmol/L 34.0*  --  28.1  --  30.0*   BUN mg/dL 16  --  15  --  15   CREATININE mg/dL 0.89  --  0.96  --  0.88   GLUCOSE mg/dL 99   < > 102*   < > 98   CALCIUM mg/dL 8.6  --  8.6  --  8.1*    < > = values in this interval not displayed.     Results from last 7 days   Lab Units 02/15/22  0636 22  1039   WBC 10*3/mm3 8.37 9.08   HEMOGLOBIN g/dL 13.3 14.5   HEMATOCRIT % 42.4 45.0   PLATELETS 10*3/mm3 196 224         Results from  "last 7 days   Lab Units 02/14/22 2131 02/14/22  1039   TROPONIN T ng/mL <0.010 <0.010         Current Medications:   Scheduled Meds:furosemide, 40 mg, Intravenous, Q12H  metoprolol succinate XL, 100 mg, Oral, BID  rivaroxaban, 20 mg, Oral, Daily With Dinner            Acute on chronic congestive heart failure (HCC)    Persistent atrial fibrillation (HCC)    Arthritis    Morbid obesity (HCC)    NICM (nonischemic cardiomyopathy) (HCC)    Essential hypertension       Plan:      --Acute on chronic suspected combined systolic and diastolic heart failure---responded well to IV diuretics, suspect occurred due to dietary indiscretions --possibly not completely under her control but she has a history of dietary indiscretions and not receptive to education.     She has put out about 7500 cc since admission yet her weight is up 5 lbs from admission---I do not think the weights are correct---she has not pitting edema and her lungs are clear. I am going to transition her to oral diuretics---will try torsemide---I think she may respond better to it rather than furosemide.      --Morbidly obese complicating all aspects of care     --Perm afib, HR well controlled on metoprolol. Rivaroxaban for AC.      --NICM, echo 2017 EF 36%---she does not want a repeat echo---says \"it would not change anything\" and I agree, exam unchanged from past, no new murmurs and she is responded well to IV diuretics---transition to oral diuretics.      --Chest pain, resolved, trop negative x 2, EKG no ischemic changes. No further work up necessary at this time.      --Persistent hypoxia---she is on oxygen at 2 liters---try to wean.     Discussion this morning with her about the importance of dietary compliance, low sodium, etc---she is not extremely receptive even though she says she is. Discussed that was probably going to transition to oral diuretics, she says well then I don't need to be here, I can do that at home---explained that would like to get " her on oral diuretics, labs were still pending when I saw her--- was going to try torsemide instead of furosemide and would like to see how she does on those for at least a day. She was agreeable.     She got IV furosemide this morning, so will start torsemide this evening.        Nazanin Paige, APRN  02/17/22  08:11 EST

## 2022-02-17 NOTE — CASE MANAGEMENT/SOCIAL WORK
Continued Stay Note  [facility]     Patient Name: Unique Talavera  MRN: [id number]  Today's Date: 2/17/2022    Admit Date: 2/14/2022     Discharge Plan     Row Name 02/17/22 1156       Plan    Plan Plan home with family assistance.   EMILY Nolan RN    Patient/Family in Agreement with Plan yes    Plan Comments Spoke with pt at bedside.  Pt denies any discharge needs.  CCP discussed HH for Nursing and PT but pt declines any referrals.  Plan home with family assistance.   EMILY Nolan RN               Discharge Codes    No documentation.               Expected Discharge Date and Time     Expected Discharge Date Expected Discharge Time    Feb 19, 2022             Patti Nolan RN

## 2022-02-17 NOTE — PLAN OF CARE
Goal Outcome Evaluation:  Plan of Care Reviewed With: patient      Pt resting well in bed. Denies pain or SOA. Pt remains on 2L NC with no issues. IV lasix given and tolerating well. Afib on Tele. Encouraged self turns. Purewick in place. No acute distress , will cont to monitor.

## 2022-02-17 NOTE — PROGRESS NOTES
Name: Unique Talavera ADMIT: 2022   : 1945  PCP: Elvis Diaz MD    MRN: [id number] LOS: 3 days   AGE/SEX: 76 y.o. female  ROOM: [facility]     Subjective   Subjective   Resting in bed, accompanied by her son.  She had many questions about the new medication, torsemide, which were answered.  She remains on 2 L nasal cannula.  Hopefully this can continue to be weaned.  Per RN no acute events overnight.    Review of Systems   Constitutional: Negative for fatigue and fever.   HENT: Negative for sinus pressure and sore throat.    Eyes: Negative for pain and redness.   Respiratory: Positive for shortness of breath. Negative for cough.    Cardiovascular: Positive for leg swelling. Negative for chest pain and palpitations.   Gastrointestinal: Negative for abdominal pain, nausea and vomiting.   Musculoskeletal: Positive for arthralgias.   Skin: Negative for rash and wound.   Psychiatric/Behavioral: Negative for behavioral problems and dysphoric mood.        Objective   Objective   Vital Signs  Temp:  [97.6 °F (36.4 °C)-98.4 °F (36.9 °C)] 98.3 °F (36.8 °C)  Heart Rate:  [73-89] 75  Resp:  [18] 18  BP: (105-130)/(63-89) 105/63  SpO2:  [90 %-93 %] 93 %  on  Flow (L/min):  [2] 2;   Device (Oxygen Therapy): nasal cannula  Body mass index is 54.81 kg/m².  Physical Exam  Constitutional:       General: She is not in acute distress.     Appearance: Normal appearance. She is obese. She is not ill-appearing.   HENT:      Head: Normocephalic and atraumatic.      Nose: Nose normal.      Mouth/Throat:      Mouth: Mucous membranes are moist.      Pharynx: Oropharynx is clear.   Eyes:      Extraocular Movements: Extraocular movements intact.      Conjunctiva/sclera: Conjunctivae normal.      Pupils: Pupils are equal, round, and reactive to light.   Cardiovascular:      Rate and Rhythm: Normal rate. Rhythm irregular.      Pulses: Normal pulses.   Pulmonary:      Effort: Pulmonary effort is normal. No respiratory distress.       Breath sounds: Normal breath sounds. No wheezing.   Abdominal:      General: Abdomen is flat. Bowel sounds are normal. There is no distension.      Palpations: Abdomen is soft.   Musculoskeletal:         General: No swelling or tenderness. Normal range of motion.      Cervical back: Normal range of motion and neck supple.      Right lower leg: No edema.      Left lower leg: No edema.      Comments: Patient is morbidly obese, no pitting edema   Skin:     General: Skin is warm and dry.   Neurological:      General: No focal deficit present.      Mental Status: She is alert and oriented to person, place, and time. Mental status is at baseline.   Psychiatric:         Mood and Affect: Mood normal.         Behavior: Behavior normal.         Thought Content: Thought content normal.         Judgment: Judgment normal.         Results Review     I reviewed the patient's new clinical results.  Results from last 7 days   Lab Units 02/15/22  0636 02/14/22  1039   WBC 10*3/mm3 8.37 9.08   HEMOGLOBIN g/dL 13.3 14.5   PLATELETS 10*3/mm3 196 224     Results from last 7 days   Lab Units 02/17/22  0556 02/16/22  0623 02/15/22  1139 02/15/22  0636   SODIUM mmol/L 141 143 137 143   POTASSIUM mmol/L 4.0 3.6 3.8 3.5   CHLORIDE mmol/L 101 102 101 103   CO2 mmol/L 25.9 34.0* 28.1 30.0*   BUN mg/dL 18 16 15 15   CREATININE mg/dL 1.02* 0.89 0.96 0.88   GLUCOSE mg/dL 101* 99 102* 98   Estimated Creatinine Clearance: 68.4 mL/min (A) (by C-G formula based on SCr of 1.02 mg/dL (H)).  Results from last 7 days   Lab Units 02/14/22  1039   ALBUMIN g/dL 4.00   BILIRUBIN mg/dL 0.7   ALK PHOS U/L 112   AST (SGOT) U/L 27   ALT (SGPT) U/L 11     Results from last 7 days   Lab Units 02/17/22  0556 02/16/22  0623 02/15/22  1139 02/15/22  0636 02/14/22  1039 02/14/22  1039   CALCIUM mg/dL 8.6 8.6 8.6 8.1*   < > 8.7   ALBUMIN g/dL  --   --   --   --   --  4.00    < > = values in this interval not displayed.       COVID19   Date Value Ref Range Status    02/14/2022 Not Detected Not Detected - Ref. Range Final     No results found for: HGBA1C, POCGLU    XR Chest 1 View  Narrative: Portal chest x-ray     HISTORY: Generalized edema for several months. Worsening shortness of  breath. Previous diagnosis of heart failure.     TECHNIQUE: A single portable chest x-ray images correlated with chest  x-ray October 25, 2019.     FINDINGS: There is pronounced enlargement of the cardiac silhouette  which appears similar to that observed on the prior x-ray. Central  pulmonary vasculature is engorged. No interstitial edema is present. The  lungs appear clear and the costophrenic sulci are dry. There is no  pneumothorax.     Impression: Cardiomegaly with pulmonary vascular engorgement. No  pulmonary interstitial edema or pneumonia is evident.     This report was finalized on 2/14/2022 11:08 AM by Dr. Lloyd Armstrong M.D.       Scheduled Medications  metoprolol succinate XL, 100 mg, Oral, BID  rivaroxaban, 20 mg, Oral, Daily With Dinner  torsemide, 20 mg, Oral, BID    Infusions   Diet  Diet Regular; Cardiac       Assessment/Plan     Active Hospital Problems    Diagnosis  POA   • **Acute on chronic congestive heart failure (HCC) [I50.9]  Yes   • Essential hypertension [I10]  Yes   • NICM (nonischemic cardiomyopathy) ([facility]) [I42.8]  Yes   • Arthritis [M19.90]  Yes   • Morbid obesity ([facility]) [E66.01]  Yes   • Persistent atrial fibrillation ([facility]) [I48.19]  Yes      Resolved Hospital Problems   No resolved problems to display.       76 y.o. female admitted with Acute on chronic congestive heart failure (HCC).    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  -Transitioned from IV Lasix to p.o. torsemide to be started this evening, cardiology is following, she declined repeat echo  - continue daily weights, strict Is/Os, monitor Cr/electrolytes while on diuretics  -Significantly hypoxic to the low 80%'s when off of oxygen, improved to 91% on 2 L, hopefully supplemental  oxygen can continue to be weaned as she continues to diurese    Permanent atrial fibrillation  - Continue metoprolol and xarelto  - rate controlled    HTN  - Continue metoprolol    Morbid obesity  - complicates all aspect of her care; recommend weight loss  - patient declined PT and OT    · Xarelto (home med) for DVT prophylaxis.  · DNR.  · Discussed with patient, family, nursing staff and care team on multidisciplinary rounds.  And consulting provider Nazanin TALAVERA.  · Anticipate discharge home with family in 1-2 days.      Maureen Flores MD  [facility]  02/17/22  10:27 EST    Patient was wearing facemask when I entered the room and throughout our encounter.  I wore protective equipment throughout this patient encounter including a face mask, gloves and protective eyewear.  Hand hygiene was performed before donning protective equipment and after removal when leaving the room.

## 2022-02-18 LAB
ANION GAP SERPL CALCULATED.3IONS-SCNC: 10 MMOL/L (ref 5–15)
BUN SERPL-MCNC: 20 MG/DL (ref 8–23)
BUN/CREAT SERPL: 22.7 (ref 7–25)
CALCIUM SPEC-SCNC: 8.5 MG/DL (ref 8.6–10.5)
CHLORIDE SERPL-SCNC: 99 MMOL/L (ref 98–107)
CO2 SERPL-SCNC: 33 MMOL/L (ref 22–29)
CREAT SERPL-MCNC: 0.88 MG/DL (ref 0.57–1)
GFR SERPL CREATININE-BSD FRML MDRD: 62 ML/MIN/1.73
GLUCOSE SERPL-MCNC: 102 MG/DL (ref 65–99)
POTASSIUM SERPL-SCNC: 4 MMOL/L (ref 3.5–5.2)
SODIUM SERPL-SCNC: 142 MMOL/L (ref 136–145)

## 2022-02-18 PROCEDURE — 80048 BASIC METABOLIC PNL TOTAL CA: CPT | Performed by: NURSE PRACTITIONER

## 2022-02-18 RX ADMIN — METOPROLOL SUCCINATE 100 MG: 100 TABLET, EXTENDED RELEASE ORAL at 20:10

## 2022-02-18 RX ADMIN — RIVAROXABAN 20 MG: 20 TABLET, FILM COATED ORAL at 18:45

## 2022-02-18 RX ADMIN — METOPROLOL SUCCINATE 100 MG: 100 TABLET, EXTENDED RELEASE ORAL at 09:34

## 2022-02-18 RX ADMIN — TORSEMIDE 20 MG: 20 TABLET ORAL at 18:45

## 2022-02-18 RX ADMIN — TORSEMIDE 20 MG: 20 TABLET ORAL at 09:34

## 2022-02-18 NOTE — PLAN OF CARE
Goal Outcome Evaluation:  Plan of Care Reviewed With: patient           Outcome Summary: Patient alert and oriented. Refusing finger probe for o2 sensor. Ambulated around room and was up in chair. Tolerating 2L o2. Good appetite. Denied pain nauea and SOA. VSS. Nursing will continue to monitor.

## 2022-02-18 NOTE — DISCHARGE SUMMARY
Patient Name: Unique Talavera  : 1945  MRN: [id number]    Date of Admission: 2022  Date of Discharge:  2022  Primary Care Physician: Elvis Diaz MD      Chief Complaint:   Shortness of Breath      Discharge Diagnoses     Active Hospital Problems    Diagnosis  POA   • **Acute on chronic congestive heart failure (HCC) [I50.9]  Yes   • Essential hypertension [I10]  Yes   • NICM (nonischemic cardiomyopathy) (HCC) [I42.8]  Yes   • Arthritis [M19.90]  Yes   • Morbid obesity (HCC) [E66.01]  Yes   • Persistent atrial fibrillation (HCC) [I48.19]  Yes      Resolved Hospital Problems   No resolved problems to display.        Hospital Course     Ms. Talavera is a 76 y.o. female with a history of hypertension, permanent A. fib, nonischemic cardiomyopathy with last known EF of 37% who presented to [facility] initially complaining of generalized weakness, shortness of breath, intermittent chest pain.  Please see the admitting history and physical for further details.  She was found to have congestive heart failure exacerbation and was admitted to the hospital for further evaluation and treatment.  Cardiology was consulted on admission.  She was diuresed with IV Lasix over the course of her admission.  Patient transition to oral diuretics with torsemide 20 mg twice daily which she tolerated well.  Patient declined to work with physical or occupational therapy despite significant debility.  Patient also declined home health services.  The patient will require oxygen on exertion at home, she desaturated to 85% off of oxygen in the room prior to discharge and improved with placement of 2L via nasal cannula. Patient stable for discharge home.  Day of Discharge     Subjective:  Patient is doing well today, she is looking forward to going home. Per RN no acute events overnight.    Review of Systems   Constitutional: Negative for fatigue and fever.   HENT: Negative for sinus pressure and sore throat.     Eyes: Negative for pain and redness.   Respiratory: Negative for cough and shortness of breath.    Cardiovascular: Negative for chest pain, palpitations and leg swelling.   Gastrointestinal: Negative for abdominal pain, nausea and vomiting.   Skin: Negative for rash and wound.   Psychiatric/Behavioral: Negative for behavioral problems and dysphoric mood.       Physical Exam:  Temp:  [98 °F (36.7 °C)-98.6 °F (37 °C)] 98.6 °F (37 °C)  Heart Rate:  [72-92] 92  Resp:  [18] 18  BP: (111-120)/(63-82) 114/82  Body mass index is 55.11 kg/m².  Physical Exam  Constitutional:       General: She is not in acute distress.     Appearance: Normal appearance. She is obese. She is not ill-appearing.   HENT:      Head: Normocephalic and atraumatic.      Nose: Nose normal.      Mouth/Throat:      Mouth: Mucous membranes are moist.      Pharynx: Oropharynx is clear.   Eyes:      Extraocular Movements: Extraocular movements intact.      Conjunctiva/sclera: Conjunctivae normal.      Pupils: Pupils are equal, round, and reactive to light.   Cardiovascular:      Rate and Rhythm: Normal rate. Rhythm irregular.      Pulses: Normal pulses.   Pulmonary:      Effort: Pulmonary effort is normal. No respiratory distress.      Breath sounds: Normal breath sounds. No wheezing.   Abdominal:      General: Abdomen is flat. Bowel sounds are normal. There is no distension.      Palpations: Abdomen is soft.   Musculoskeletal:         General: No swelling or tenderness. Normal range of motion.      Cervical back: Normal range of motion and neck supple.      Right lower leg: No edema.      Left lower leg: No edema.      Comments: Patient is morbidly obese, no pitting edema   Skin:     General: Skin is warm and dry.   Neurological:      General: No focal deficit present.      Mental Status: She is alert and oriented to person, place, and time. Mental status is at baseline.   Psychiatric:         Mood and Affect: Mood normal.         Behavior: Behavior  normal.         Thought Content: Thought content normal.         Judgment: Judgment normal.         Consultants     Consult Orders (all) (From admission, onward)     Start     Ordered    02/14/22 1857  Inpatient Cardiology Consult  Once        Specialty:  Cardiology  Provider:  Lloyd Martines MD    02/14/22 1857    02/14/22 1447  [facility] (on-call MD unless specified) Details  Once        Specialty:  Hospitalist  Provider:  Marcia Martinez MD    02/14/22 1446    02/14/22 1334  [facility] (on-call MD unless specified)  Once        Specialty:  Cardiology  Provider:  (Not yet assigned)    02/14/22 1333              Procedures     Imaging Results (All)     Procedure Component Value Units Date/Time    XR Chest 1 View [[id number]] Collected: 02/14/22 1107     Updated: 02/14/22 1111    Narrative:      Portal chest x-ray     HISTORY: Generalized edema for several months. Worsening shortness of  breath. Previous diagnosis of heart failure.     TECHNIQUE: A single portable chest x-ray images correlated with chest  x-ray October 25, 2019.     FINDINGS: There is pronounced enlargement of the cardiac silhouette  which appears similar to that observed on the prior x-ray. Central  pulmonary vasculature is engorged. No interstitial edema is present. The  lungs appear clear and the costophrenic sulci are dry. There is no  pneumothorax.       Impression:      Cardiomegaly with pulmonary vascular engorgement. No  pulmonary interstitial edema or pneumonia is evident.     This report was finalized on 2/14/2022 11:08 AM by Dr. Lloyd Armstrong M.D.             Pertinent Labs     Results from last 7 days   Lab Units 02/15/22  0636 02/14/22  1039   WBC 10*3/mm3 8.37 9.08   HEMOGLOBIN g/dL 13.3 14.5   PLATELETS 10*3/mm3 196 224     Results from last 7 days   Lab Units 02/18/22  0551 02/17/22  0556 02/16/22  0623 02/15/22  1139   SODIUM mmol/L 142 141 143 137   POTASSIUM mmol/L 4.0 4.0 3.6 3.8   CHLORIDE mmol/L 99 101 102 101   CO2 mmol/L 33.0*  25.9 34.0* 28.1   BUN mg/dL 20 18 16 15   CREATININE mg/dL 0.88 1.02* 0.89 0.96   GLUCOSE mg/dL 102* 101* 99 102*   Estimated Creatinine Clearance: 79.6 mL/min (by C-G formula based on SCr of 0.88 mg/dL).  Results from last 7 days   Lab Units 02/14/22  1039   ALBUMIN g/dL 4.00   BILIRUBIN mg/dL 0.7   ALK PHOS U/L 112   AST (SGOT) U/L 27   ALT (SGPT) U/L 11     Results from last 7 days   Lab Units 02/18/22  0551 02/17/22  0556 02/16/22  0623 02/15/22  1139 02/15/22  0636 02/14/22  1039   CALCIUM mg/dL 8.5* 8.6 8.6 8.6   < > 8.7   ALBUMIN g/dL  --   --   --   --   --  4.00    < > = values in this interval not displayed.       Results from last 7 days   Lab Units 02/14/22  2131 02/14/22  1039   TROPONIN T ng/mL <0.010 <0.010   PROBNP pg/mL  --  2,372.0*           Invalid input(s): LDLCALC        Test Results Pending at Discharge       Discharge Details        Discharge Medications      ASK your doctor about these medications      Instructions Start Date   furosemide 40 MG tablet  Commonly known as: LASIX   TAKE ONE TABLET BY MOUTH DAILY      metoprolol succinate  MG 24 hr tablet  Commonly known as: TOPROL-XL   100 mg, Oral, 2 Times Daily      rivaroxaban 20 MG tablet  Commonly known as: Xarelto   20 mg, Oral, Daily With Dinner             No Known Allergies      Discharge Disposition:      Discharge Diet:  Diet Order   Procedures   • Diet Regular; Cardiac       Discharge Activity:       CODE STATUS:    Code Status and Medical Interventions:   Ordered at: 02/14/22 2018     Medical Intervention Limits:    NO intubation (DNI)     Code Status (Patient has no pulse and is not breathing):    No CPR (Do Not Attempt to Resuscitate)     Medical Interventions (Patient has pulse or is breathing):    Limited Support       Future Appointments   Date Time Provider Department Center   4/22/2022  9:40 AM Lloyd Martines MD [facility]       Time Spent on Discharge:  Greater than 30 minutes      Maureen Flores,  MD [facility]  02/18/22  13:15 EST

## 2022-02-18 NOTE — PROGRESS NOTES
Name: Unique Talavera ADMIT: 2022   : 1945  PCP: Elvsi Diaz MD    MRN: [id number] LOS: 4 days   AGE/SEX: 76 y.o. female  ROOM: [facility]/     Subjective   Subjective   Resting in bed.  She is refusing to wear her oxygen saturation probe and promptly rips off the new probe placed by the nurse while I was in the room.  The patient is convinced she took 2 doses of torsemide yesterday despite only having 1 dose listed in the computer.  I tried explaining the medication needed to be sent by pharmacy so it is unlikely that she did receive 2 doses but the patient is unreceptive to this information.  She remains on 2 L nasal cannula.     Review of Systems   Constitutional: Negative for fatigue and fever.   HENT: Negative for sinus pressure and sore throat.    Eyes: Negative for pain and redness.   Respiratory: Positive for shortness of breath. Negative for cough.    Cardiovascular: Positive for leg swelling. Negative for chest pain and palpitations.   Gastrointestinal: Negative for abdominal pain, nausea and vomiting.   Musculoskeletal: Positive for arthralgias.   Skin: Negative for rash and wound.   Psychiatric/Behavioral: Negative for behavioral problems and dysphoric mood.        Objective   Objective   Vital Signs  Temp:  [97.4 °F (36.3 °C)-98.6 °F (37 °C)] 97.4 °F (36.3 °C)  Heart Rate:  [72-92] 83  Resp:  [18] 18  BP: (111-128)/(63-82) 128/81  SpO2:  [90 %-94 %] 90 %  on  Flow (L/min):  [2] 2;   Device (Oxygen Therapy): nasal cannula  Body mass index is 55.11 kg/m².  Physical Exam  Constitutional:       General: She is not in acute distress.     Appearance: Normal appearance. She is obese. She is not ill-appearing.   HENT:      Head: Normocephalic and atraumatic.      Nose: Nose normal.      Mouth/Throat:      Mouth: Mucous membranes are moist.      Pharynx: Oropharynx is clear.   Eyes:      Extraocular Movements: Extraocular movements intact.      Conjunctiva/sclera: Conjunctivae normal.       Pupils: Pupils are equal, round, and reactive to light.   Cardiovascular:      Rate and Rhythm: Normal rate. Rhythm irregular.      Pulses: Normal pulses.   Pulmonary:      Effort: Pulmonary effort is normal. No respiratory distress.      Breath sounds: Normal breath sounds. No wheezing.   Abdominal:      General: Abdomen is flat. Bowel sounds are normal. There is no distension.      Palpations: Abdomen is soft.   Musculoskeletal:         General: No swelling or tenderness. Normal range of motion.      Cervical back: Normal range of motion and neck supple.      Right lower leg: No edema.      Left lower leg: No edema.      Comments: Patient is morbidly obese, no pitting edema   Skin:     General: Skin is warm and dry.   Neurological:      General: No focal deficit present.      Mental Status: She is alert and oriented to person, place, and time. Mental status is at baseline.   Psychiatric:         Mood and Affect: Mood normal.         Behavior: Behavior normal.         Thought Content: Thought content normal.         Judgment: Judgment normal.         Results Review     I reviewed the patient's new clinical results.  Results from last 7 days   Lab Units 02/15/22  0636 02/14/22  1039   WBC 10*3/mm3 8.37 9.08   HEMOGLOBIN g/dL 13.3 14.5   PLATELETS 10*3/mm3 196 224     Results from last 7 days   Lab Units 02/18/22  0551 02/17/22  0556 02/16/22  0623 02/15/22  1139   SODIUM mmol/L 142 141 143 137   POTASSIUM mmol/L 4.0 4.0 3.6 3.8   CHLORIDE mmol/L 99 101 102 101   CO2 mmol/L 33.0* 25.9 34.0* 28.1   BUN mg/dL 20 18 16 15   CREATININE mg/dL 0.88 1.02* 0.89 0.96   GLUCOSE mg/dL 102* 101* 99 102*   Estimated Creatinine Clearance: 79.6 mL/min (by C-G formula based on SCr of 0.88 mg/dL).  Results from last 7 days   Lab Units 02/14/22  1039   ALBUMIN g/dL 4.00   BILIRUBIN mg/dL 0.7   ALK PHOS U/L 112   AST (SGOT) U/L 27   ALT (SGPT) U/L 11     Results from last 7 days   Lab Units 02/18/22  0551 02/17/22  0556 02/16/22  0623  02/15/22  1139 02/15/22  0636 02/14/22  1039   CALCIUM mg/dL 8.5* 8.6 8.6 8.6   < > 8.7   ALBUMIN g/dL  --   --   --   --   --  4.00    < > = values in this interval not displayed.       COVID19   Date Value Ref Range Status   02/14/2022 Not Detected Not Detected - Ref. Range Final     No results found for: HGBA1C, POCGLU    XR Chest 1 View  Narrative: Portal chest x-ray     HISTORY: Generalized edema for several months. Worsening shortness of  breath. Previous diagnosis of heart failure.     TECHNIQUE: A single portable chest x-ray images correlated with chest  x-ray October 25, 2019.     FINDINGS: There is pronounced enlargement of the cardiac silhouette  which appears similar to that observed on the prior x-ray. Central  pulmonary vasculature is engorged. No interstitial edema is present. The  lungs appear clear and the costophrenic sulci are dry. There is no  pneumothorax.     Impression: Cardiomegaly with pulmonary vascular engorgement. No  pulmonary interstitial edema or pneumonia is evident.     This report was finalized on 2/14/2022 11:08 AM by Dr. Lloyd Armstrong M.D.       Scheduled Medications  metoprolol succinate XL, 100 mg, Oral, BID  rivaroxaban, 20 mg, Oral, Daily With Dinner  torsemide, 20 mg, Oral, BID    Infusions   Diet  Diet Regular; Cardiac       Assessment/Plan     Active Hospital Problems    Diagnosis  POA   • **Acute on chronic congestive heart failure (HCC) [I50.9]  Yes   • Essential hypertension [I10]  Yes   • NICM (nonischemic cardiomyopathy) ([facility]) [I42.8]  Yes   • Arthritis [M19.90]  Yes   • Morbid obesity ([facility]) [E66.01]  Yes   • Persistent atrial fibrillation ([facility]) [I48.19]  Yes      Resolved Hospital Problems   No resolved problems to display.       76 y.o. female admitted with Acute on chronic congestive heart failure (HCC).    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  -Transitioned from IV Lasix to p.o. torsemide yesterday, cardiology is following, she declined  repeat echo  - continue daily weights, strict Is/Os, monitor Cr/electrolytes while on diuretics  -Significantly hypoxic to the low 80%'s when off of oxygen, improved to 91% on 2 L, hopefully supplemental oxygen can continue to be weaned as she continues to diurese  -She will likely need to go with supplemental oxygen    Permanent atrial fibrillation  - Continue metoprolol and xarelto  - rate controlled    HTN  - Continue metoprolol    Morbid obesity  - complicates all aspect of her care; recommend weight loss  - patient declined PT and OT    · Xarelto (home med) for DVT prophylaxis.  · DNR.  · Discussed with patient, family, nursing staff and care team on multidisciplinary rounds.    · Anticipate discharge home with family Today versus tomorrow      Maureen Flores MD  [facility]  02/18/22  15:01 EST    Patient was wearing facemask when I entered the room and throughout our encounter.  I wore protective equipment throughout this patient encounter including a face mask, gloves and protective eyewear.  Hand hygiene was performed before donning protective equipment and after removal when leaving the room.

## 2022-02-19 ENCOUNTER — READMISSION MANAGEMENT (OUTPATIENT)
Dept: CALL CENTER | Facility: HOSPITAL | Age: 77
End: 2022-02-19

## 2022-02-19 VITALS
BODY MASS INDEX: 48.82 KG/M2 | HEART RATE: 86 BPM | RESPIRATION RATE: 18 BRPM | OXYGEN SATURATION: 92 % | HEIGHT: 65 IN | DIASTOLIC BLOOD PRESSURE: 71 MMHG | SYSTOLIC BLOOD PRESSURE: 107 MMHG | WEIGHT: 293 LBS | TEMPERATURE: 97.8 F

## 2022-02-19 LAB
ANION GAP SERPL CALCULATED.3IONS-SCNC: 7.2 MMOL/L (ref 5–15)
BUN SERPL-MCNC: 23 MG/DL (ref 8–23)
BUN/CREAT SERPL: 23.7 (ref 7–25)
CALCIUM SPEC-SCNC: 8.6 MG/DL (ref 8.6–10.5)
CHLORIDE SERPL-SCNC: 98 MMOL/L (ref 98–107)
CO2 SERPL-SCNC: 34.8 MMOL/L (ref 22–29)
CREAT SERPL-MCNC: 0.97 MG/DL (ref 0.57–1)
GFR SERPL CREATININE-BSD FRML MDRD: 56 ML/MIN/1.73
GLUCOSE SERPL-MCNC: 101 MG/DL (ref 65–99)
POTASSIUM SERPL-SCNC: 3.8 MMOL/L (ref 3.5–5.2)
SODIUM SERPL-SCNC: 140 MMOL/L (ref 136–145)

## 2022-02-19 PROCEDURE — 99232 SBSQ HOSP IP/OBS MODERATE 35: CPT | Performed by: INTERNAL MEDICINE

## 2022-02-19 PROCEDURE — 80048 BASIC METABOLIC PNL TOTAL CA: CPT | Performed by: NURSE PRACTITIONER

## 2022-02-19 RX ORDER — TORSEMIDE 20 MG/1
20 TABLET ORAL EVERY 12 HOURS
Qty: 60 TABLET | Refills: 0 | Status: SHIPPED | OUTPATIENT
Start: 2022-02-19 | End: 2022-03-03

## 2022-02-19 RX ADMIN — TORSEMIDE 20 MG: 20 TABLET ORAL at 08:04

## 2022-02-19 RX ADMIN — METOPROLOL SUCCINATE 100 MG: 100 TABLET, EXTENDED RELEASE ORAL at 08:04

## 2022-02-19 NOTE — CASE MANAGEMENT/SOCIAL WORK
Continued Stay Note  [facility]     Patient Name: Unique Talavera  MRN: [id number]  Today's Date: 2/19/2022    Admit Date: 2/14/2022     Discharge Plan     Row Name 02/19/22 1154       Plan    Plan Plan home with O2 supplied by [employer] and family assistance.   EMILY Nolan RN    Patient/Family in Agreement with Plan yes    Plan Comments Pt with discharge order.  Pt qualifies for home O2.  Called and spoke with pt.  Pt choice for home O2 is [employer].   [employer] (Priyanka) called and notified of pt's discharge.  O2 tank delivered to pt's room.  Plan home with O2 supplied by [employer] and family assistance.   EMILY Nolan RN               Discharge Codes    No documentation.               Expected Discharge Date and Time     Expected Discharge Date Expected Discharge Time    Feb 19, 2022             Patti Nolan RN

## 2022-02-19 NOTE — PLAN OF CARE
Goal Outcome Evaluation:         Pt resting in bed quietly. Denies pain. Can turn self but has to be heavily encouraged. 2 L o2 . Pt refusing to stand for bathroom even though she says she walks at home. Purewick in place.

## 2022-02-19 NOTE — NURSING NOTE
Pt oxygen was removed and dropped to 85% on RA and stayed between at 85-87% for 15 mins.  2L of oxygen applied and returned to 93%

## 2022-02-19 NOTE — DISCHARGE PLACEMENT REQUEST
"Unique Talavera (76 y.o. Female)             Date of Birth Social Security Number Address Home Phone MRN    1945  [address] [facility] [facility] [address] [phone] [id number]    [employer] Marital Status             Jehovah's witness        Admission Date Admission Type Admitting Provider Attending Provider Department, Room/Bed    2/14/22 Emergency Stingl, MD Sandra Willett Margaux M, MD [address], [address]    Discharge Date Discharge Disposition Discharge Destination           Home or Self Care              Attending Provider: Maureen Flores MD    Allergies: No Known Allergies    Isolation: None   Infection: None   Code Status: No CPR   Advance Care Planning Activity    Ht: 165.1 cm (65\")   Wt: 155 kg (341 lb 11.4 oz)    Admission Cmt: None   Principal Problem: Acute on chronic congestive heart failure (HCC) [I50.9]                 Active Insurance as of 2/14/2022     Primary Coverage     Payor Plan Insurance Group Employer/Plan Group    HUMANA MEDICARE REPLACEMENT HUMANA MEDICARE REPLACEMENT [id number]     Payor Plan Address Payor Plan Phone Number Payor Plan Fax Number Effective Dates    [address] [address] [phone]  1/1/2018 - None Entered    [facility] [id number]       Subscriber Name Subscriber Birth Date Member ID       UNIQUE TALAVERA 1945 [id number]                 Emergency Contacts      (Rel.) Home Phone Work Phone Mobile Phone    IlanMarcio (Son) [phone] -- [phone]    IlanDamien (Son) -- -- [phone]              "

## 2022-02-19 NOTE — PROGRESS NOTES
HOSPITAL FOLLOW UP NOTE    Patient Name: Unique Talavera  Patient : 1945        Date of Service:22  Provider of Service: Griffin Hartmann MD  Place of Service: [facility]  Referral Provider: Marcia Martinez, *          Follow Up: Cardiomyopathy, congestive heart failure    Interval Hx: Patient breathing much better today.  She is on torsemide now and did diurese a reasonable amount yesterday.  Remains morbidly obese.        OBJECTIVE  Temp:  [97.3 °F (36.3 °C)-97.8 °F (36.6 °C)] 97.8 °F (36.6 °C)  Heart Rate:  [83-91] 86  Resp:  [18] 18  BP: ()/(59-81) 107/71     Intake/Output Summary (Last 24 hours) at 2022 1023  Last data filed at 2022 0519  Gross per 24 hour   Intake 360 ml   Output 2150 ml   Net -1790 ml     Body mass index is 56.86 kg/m².      22  0300 22  0619 22  0519   Weight: (!) 149 kg (329 lb 5.9 oz) (!) 150 kg (331 lb 3.2 oz) (!) 155 kg (341 lb 11.4 oz)         Physical Exam:   Vitals reviewed.   Constitutional:       Appearance: Well-developed.   Eyes:      Pupils: Pupils are equal, round, and reactive to light.   HENT:      Head: Normocephalic.   Neck:      Thyroid: No thyromegaly.      Vascular: No carotid bruit or JVD.   Pulmonary:      Effort: Pulmonary effort is normal.      Breath sounds: Normal breath sounds.   Cardiovascular:      Normal rate. Irregularly irregular rhythm. Normal S1. Normal S2.      No gallop. No click. No rub.   Pulses:     Intact distal pulses.   Edema:     Thigh: bilateral edema of the thigh.     Pretibial: edema of the left pretibial area and 1+ edema of the right pretibial area.     Ankle: bilateral 1+ edema of the ankle.     Feet: bilateral 1+ edema of the feet.  Abdominal:      General: Bowel sounds are normal.      Palpations: Abdomen is soft.   Musculoskeletal:      Cervical back: Normal range of motion. Skin:     General: Skin is warm and dry.      Findings: No erythema.   Neurological:       Mental Status: Alert and oriented to person, place, and time.           CURRENT MEDS    Scheduled Meds:metoprolol succinate XL, 100 mg, Oral, BID  rivaroxaban, 20 mg, Oral, Daily With Dinner  torsemide, 20 mg, Oral, BID      Continuous Infusions:       Lab Review:   Results from last 7 days   Lab Units 02/19/22  0517 02/18/22  0551 02/15/22  0636 02/14/22  1039   SODIUM mmol/L 140 142   < > 140   POTASSIUM mmol/L 3.8 4.0   < > 4.4   CHLORIDE mmol/L 98 99   < > 105   CO2 mmol/L 34.8* 33.0*   < > 20.0*   BUN mg/dL 23 20   < > 17   CREATININE mg/dL 0.97 0.88   < > 1.09*   GLUCOSE mg/dL 101* 102*   < > 101*   CALCIUM mg/dL 8.6 8.5*   < > 8.7   AST (SGOT) U/L  --   --   --  27   ALT (SGPT) U/L  --   --   --  11    < > = values in this interval not displayed.         Results from last 7 days   Lab Units 02/15/22  0636 02/14/22  1039   WBC 10*3/mm3 8.37 9.08   HEMOGLOBIN g/dL 13.3 14.5   HEMATOCRIT % 42.4 45.0   PLATELETS 10*3/mm3 196 224                 Results from last 7 days   Lab Units 02/14/22  1039   PROBNP pg/mL 2,372.0*           I personally reviewed the patient's ECG and telemetry data    ASSESSMENT & PLAN    Acute on chronic congestive heart failure (HCC)    Persistent atrial fibrillation (HCC)    Arthritis    Morbid obesity (HCC)    NICM (nonischemic cardiomyopathy) (HCC)    Essential hypertension    At this point the patient can be discharged home.  We will have her follow-up with [facility] Pruitt in 2 weeks.  Discussed with the patient in detail refrain meant of salt.  Continue torsemide and beta-blocker as well as rivaroxaban    Griffin Hartmann MD  02/19/22

## 2022-02-19 NOTE — CASE MANAGEMENT/SOCIAL WORK
Case Management Discharge Note      Final Note: Pt discharged home with O2 supplied by Marcial Nolan RN         Selected Continued Care - Discharged on 2/19/2022 Admission date: 2/14/2022 - Discharge disposition: Home or Self Care    Destination    No services have been selected for the patient.              Durable Medical Equipment Coordination complete.    Service Provider Selected Services Address Phone Fax Patient Preferred    [facility]  [facility] [address], [place] [address] [phone] --          Dialysis/Infusion    No services have been selected for the patient.              Home Medical Care    No services have been selected for the patient.              Therapy    No services have been selected for the patient.              Community Resources    No services have been selected for the patient.              Community & DME    No services have been selected for the patient.                  Transportation Services  Private: Car    Final Discharge Disposition Code: 01 - home or self-care

## 2022-02-23 ENCOUNTER — READMISSION MANAGEMENT (OUTPATIENT)
Dept: CALL CENTER | Facility: HOSPITAL | Age: 77
End: 2022-02-23

## 2022-02-23 NOTE — OUTREACH NOTE
CHF Week 1 Survey      Responses   [facility] patient discharged from? [facility]   Does the patient have one of the following disease processes/diagnoses(primary or secondary)? CHF   CHF Week 1 attempt successful? No   Unsuccessful attempts Attempt 1          Tiffany Mcdaniel RN

## 2022-02-25 ENCOUNTER — READMISSION MANAGEMENT (OUTPATIENT)
Dept: CALL CENTER | Facility: HOSPITAL | Age: 77
End: 2022-02-25

## 2022-02-25 NOTE — OUTREACH NOTE
"CHF Week 1 Survey      Responses   [facility] patient discharged from? [facility]   Does the patient have one of the following disease processes/diagnoses(primary or secondary)? CHF   CHF Week 1 attempt successful? Yes   Call start time 1609   Call end time 1611    CHF Week 1 call completed? Yes   Wrap up additional comments Very brief call with patient-states,\"I'm doing fine. I'll have to talk with you another time.\"           Socorro Jennings RN  "

## 2022-03-03 ENCOUNTER — OFFICE VISIT (OUTPATIENT)
Dept: CARDIOLOGY | Facility: CLINIC | Age: 77
End: 2022-03-03

## 2022-03-03 ENCOUNTER — TELEPHONE (OUTPATIENT)
Dept: CARDIOLOGY | Facility: CLINIC | Age: 77
End: 2022-03-03

## 2022-03-03 VITALS
HEART RATE: 93 BPM | OXYGEN SATURATION: 97 % | HEIGHT: 65 IN | DIASTOLIC BLOOD PRESSURE: 72 MMHG | WEIGHT: 293 LBS | SYSTOLIC BLOOD PRESSURE: 130 MMHG | BODY MASS INDEX: 48.82 KG/M2

## 2022-03-03 DIAGNOSIS — E66.01 MORBID OBESITY: ICD-10-CM

## 2022-03-03 DIAGNOSIS — I48.19 PERSISTENT ATRIAL FIBRILLATION: ICD-10-CM

## 2022-03-03 DIAGNOSIS — I42.8 NICM (NONISCHEMIC CARDIOMYOPATHY): Primary | ICD-10-CM

## 2022-03-03 DIAGNOSIS — I50.23 ACUTE ON CHRONIC SYSTOLIC CONGESTIVE HEART FAILURE: ICD-10-CM

## 2022-03-03 PROCEDURE — 93000 ELECTROCARDIOGRAM COMPLETE: CPT | Performed by: NURSE PRACTITIONER

## 2022-03-03 PROCEDURE — 99214 OFFICE O/P EST MOD 30 MIN: CPT | Performed by: NURSE PRACTITIONER

## 2022-03-03 RX ORDER — TORSEMIDE 20 MG/1
20 TABLET ORAL DAILY
Qty: 60 TABLET | Refills: 0
Start: 2022-03-03 | End: 2022-03-30 | Stop reason: SDUPTHER

## 2022-03-03 NOTE — TELEPHONE ENCOUNTER
Pt was seen today and you put 6 month fu and she said you told her 3 months. Which needs to get scheduled ?    Thanks  Priyanka

## 2022-03-03 NOTE — PROGRESS NOTES
Date of Office Visit: 2022  Encounter Provider: SADAF Quintero  Place of Service: [facility] CARDIOLOGY  Patient Name: Unique Talavera  :1945    Chief Complaint   Patient presents with   • Atrial Fibrillation   • Congestive Heart Failure   :     HPI: Unique Talavera is a 76 y.o. female who follows with Dr. Martines for perm afib and NICM. She is also morbidly obese, has HTN and chronic back/neck pain.     She presents today for follow up from recent hospitalization for acute on chronic systolic heart failure---likely due to dietary indiscretions.     She is doing much better. She denies chest pain, significant dyspnea, PND, orthopnea or palpitations.     Her weight at time of dc was recorded as 341 lbs--today on our office scale she was 312 lbs (she did not want to know her weight). During hospitalization---we questions how accurate the weight was because despite diuresing well it kept going up.     We switched her to torsemide---she was dc'd home on 20 mg every 12 hours ---she tells me today that she has only been taking 20 mg daily--this is common for her to adjust her meds herself. She said she could not take a dose every 12 hours because it ran her to the bathroom constantly.    She remains on rivaroxaban for AC. No issues    Her last echo was in 2017-EF 37% at that time, she declined having a repeat echo---she really does not want one---she is a DNR and does not want aggressive treatment or anything that is not going to change what we do.     She did have some intermittent dizziness but this is even with just sitting or lying down---she is pretty sure it is from her neck issues---she tried switching back to furosemide one day to see if the torsemide was the cause but it was no different. She has not had any falls---in a wc today but ambulates with a cane.     She says she has really been trying to watch her sodium intake and do better with food choices.      Past  Patient called today requesting a refill of her warfarin. She reports she has been having her INRs checked regularly but is not managed by anyone? Per our records she is managed by [facility], possibly [facility] as she has a cardiologist in there group. Left message at [facility] and [facility] to find out if they are managing patient. IF they don't manage pt we will need to get referral and re-establish with pt.     Sarahy Fink, PharmD      Medical History:   Diagnosis Date   • Acute on chronic congestive heart failure (HCC)    • Afib (HCC)    • Arthritis    • Atrial fibrillation status post cardioversion ([facility]) 04/15/2016   • Cardiomyopathy (HCC)    • Chronic systolic congestive heart failure (HCC)    • Coronary artery disease    • Daytime sleepiness    • Depression    • Dizziness    • Hypertension    • Leukocytosis    • NICM (nonischemic cardiomyopathy) ([facility])    • Obesity     morbid   • Permanent atrial fibrillation (HCC)    • Persistent atrial fibrillation (HCC)    • Vertigo        Past Surgical History:   Procedure Laterality Date   • CARDIAC CATHETERIZATION N/A 2016    Procedure: Left Heart Cath;  Surgeon: Jostin Arce MD;  Location: [facility] CATH INVASIVE LOCATION;  Service:    • CARDIAC CATHETERIZATION N/A 2016    Procedure: Right Heart Cath;  Surgeon: Jostin Arce MD;  Location:  [facility] CATH INVASIVE LOCATION;  Service:    •  SECTION     •  SECTION     • KNEE SURGERY     • WRIST SURGERY         Social History     Socioeconomic History   • Marital status:    Tobacco Use   • Smoking status: Former Smoker     Quit date:      Years since quittin.2   • Smokeless tobacco: Never Used   Vaping Use   • Vaping Use: Never used   Substance and Sexual Activity   • Alcohol use: Not Currently     Comment: caffeine use: 3 cups daily    • Drug use: No   • Sexual activity: Defer       Family History   Problem Relation Age of Onset   • Heart disease Mother    • Stroke Father        Review of Systems   Constitutional: Negative for chills, fever and malaise/fatigue.   Cardiovascular: Negative for chest pain, dyspnea on exertion, leg swelling, near-syncope, orthopnea, palpitations, paroxysmal nocturnal dyspnea and syncope.   Respiratory: Negative for cough and shortness of breath.    Hematologic/Lymphatic: Negative.    Musculoskeletal: Positive for arthritis and neck pain. Negative for joint pain, joint  "swelling and myalgias.   Gastrointestinal: Negative for abdominal pain, diarrhea, melena, nausea and vomiting.   Genitourinary: Negative for frequency and hematuria.   Neurological: Negative for light-headedness, numbness, paresthesias and seizures.   Allergic/Immunologic: Negative.    All other systems reviewed and are negative.      No Known Allergies      Current Outpatient Medications:   •  metoprolol succinate XL (TOPROL-XL) 100 MG 24 hr tablet, Take 1 tablet by mouth 2 (Two) Times a Day., Disp: 180 tablet, Rfl: 3  •  rivaroxaban (Xarelto) 20 MG tablet, Take 1 tablet by mouth Daily With Dinner., Disp: 28 tablet, Rfl: 0  •  torsemide (DEMADEX) 20 MG tablet, Take 1 tablet by mouth Every 12 (Twelve) Hours., Disp: 60 tablet, Rfl: 0      Objective:     Vitals:    03/03/22 1011 03/03/22 1218   BP: 130/72    BP Location: Left arm    Pulse: 93    SpO2: 97%    Weight: (!) 155 kg (341 lb) (!) 142 kg (312 lb 12.8 oz)   Height: 165.1 cm (65\")      Body mass index is 52.05 kg/m².    PHYSICAL EXAM:    Vitals Reviewed.   General Appearance: No acute distress, morbidly obese.   Eyes: Conjunctiva and lids: No erythema, swelling, or discharge. Sclera non-icteric.   HENT: Atraumatic, normocephalic. External eyes, ears, and nose normal.   Respiratory: No signs of respiratory distress. Respiration rhythm and depth normal.   Clear to auscultation. No rales, crackles, rhonchi, or wheezing auscultated.   Cardiovascular:  Heart Rate and Rhythm: Irregularly, irregular.  Heart Sounds: S1 and S2.  Murmurs: No murmurs noted. No rubs, thrills, or gallops.   Lower Extremities: Difficult to assess certainly has no pitting edema.  Gastrointestinal:  Abdomen obese.   Musculoskeletal: Moves all extremities.   Skin: Warm and dry.   Psychiatric: Patient alert and oriented to person, place, and time. Speech and behavior appropriate. Normal mood and affect.       ECG 12 Lead    Date/Time: 3/3/2022 10:25 AM  Performed by: Nazanin Paige" SADAF  Authorized by: Nazanin Paige APRN   Comparison: compared with previous ECG   Similar to previous ECG  Rhythm: atrial fibrillation  BPM: 72                Assessment:       Diagnosis Plan   1. NICM (nonischemic cardiomyopathy) (HCC)  ECG 12 Lead   2. Acute on chronic systolic congestive heart failure (HCC)  ECG 12 Lead   3. Persistent atrial fibrillation (HCC)     4. Morbid obesity (HCC)            Plan:       1. NICM, acute on chronic systolic HF---she actually looks euvolemic by exam. I think she is doing better on the torsemide---she is taking 20 mg daily, we talked about when she should take extra dose. Echo 2017 EF 37%---she had declined repeat echos--which is fine, she does not want any aggressive treatments and it has actually been awhile since she has been hospitalized with heart failure.     3. Perm afib, HR controlled on metoprolol---rivaroxaban for AC --probably not the best choice given her morbid obesity but she will not do warfarin.     4. Morbid obesity which complicates all aspects of her care---her weight is significantly different/lower than her recent hospitalization but not sure of the accuracy. Discussed importance of weight loss.     Follow up with Dr. Martines in 6 months.     As always, it has been a pleasure to participate in your patient's care.      Sincerely,         SADAF Maravilla

## 2022-03-04 ENCOUNTER — READMISSION MANAGEMENT (OUTPATIENT)
Dept: CALL CENTER | Facility: HOSPITAL | Age: 77
End: 2022-03-04

## 2022-03-04 NOTE — OUTREACH NOTE
CHF Week 2 Survey      Responses   [facility] patient discharged from? [place]   Does the patient have one of the following disease processes/diagnoses(primary or secondary)? CHF   Week 2 attempt successful? No   Unsuccessful attempts Attempt 1          Sanchez Lopez RN

## 2022-03-08 ENCOUNTER — READMISSION MANAGEMENT (OUTPATIENT)
Dept: CALL CENTER | Facility: HOSPITAL | Age: 77
End: 2022-03-08

## 2022-03-08 NOTE — OUTREACH NOTE
"CHF Week 2 Survey    Flowsheet Row Responses   [facility] patient discharged from? [place]   Does the patient have one of the following disease processes/diagnoses(primary or secondary)? CHF   Week 2 attempt successful? Yes   Call start time 1647   Call end time 1649   Discharge diagnosis Acute on chronic congestive heart failure    Is patient permission given to speak with other caregiver? Yes   List who call center can speak with son   Meds reviewed with patient/caregiver? Yes   Is the patient having any side effects they believe may be caused by any medication additions or changes? No   Is the patient taking all medications as directed (includes completed medication regime)? Yes   Does the patient have a primary care provider?  Yes   Does the patient have an appointment with their PCP within 7 days of discharge? Yes   Has the patient kept scheduled appointments due by today? Yes   What DME was ordered? [employer]   Has all DME been delivered? Yes   DME comments not wearing home O2.   Pulse Ox monitoring None   Psychosocial issues? No   Comments Pt denies further SOA or other issues.    What is the patient's perception of their health status since discharge? Returned to baseline/stable   CHF Week 2 call completed? Yes   Wrap up additional comments Very brief call with patient-states,\"I'm doing fine. thanks for calling\"          LEXUS VILLALOBOS - Registered Nurse  "

## 2022-03-16 ENCOUNTER — READMISSION MANAGEMENT (OUTPATIENT)
Dept: CALL CENTER | Facility: HOSPITAL | Age: 77
End: 2022-03-16

## 2022-03-16 NOTE — OUTREACH NOTE
CHF Week 3 Survey    Flowsheet Row Responses   [facility] facility patient discharged from? [place]   Does the patient have one of the following disease processes/diagnoses(primary or secondary)? CHF   Week 3 attempt successful? No   Unsuccessful attempts Attempt 1          ILENE GEORGE - Registered Nurse

## 2022-03-22 ENCOUNTER — READMISSION MANAGEMENT (OUTPATIENT)
Dept: CALL CENTER | Facility: HOSPITAL | Age: 77
End: 2022-03-22

## 2022-03-22 NOTE — OUTREACH NOTE
CHF Week 3 Survey    Flowsheet Row Responses   [facility] patient discharged from? [place]   Does the patient have one of the following disease processes/diagnoses(primary or secondary)? CHF   Week 3 attempt successful? No   Unsuccessful attempts Attempt 2          JAY JAY ADAMS - Registered Nurse

## 2022-03-30 RX ORDER — TORSEMIDE 20 MG/1
20 TABLET ORAL DAILY
Qty: 90 TABLET | Refills: 2 | Status: SHIPPED | OUTPATIENT
Start: 2022-03-30 | End: 2022-07-26 | Stop reason: ALTCHOICE

## 2022-07-26 ENCOUNTER — TELEPHONE (OUTPATIENT)
Dept: CARDIOLOGY | Facility: CLINIC | Age: 77
End: 2022-07-26

## 2022-07-26 RX ORDER — FUROSEMIDE 40 MG/1
40 TABLET ORAL 2 TIMES DAILY
Qty: 180 TABLET | Refills: 3 | Status: SHIPPED | OUTPATIENT
Start: 2022-07-26 | End: 2022-12-12

## 2022-07-26 NOTE — TELEPHONE ENCOUNTER
Called and spoke with pt. She is aware. She would like to know if she can be switched back to Lasix 40 mg BID. She thinks the Lasix works better.

## 2022-07-26 NOTE — TELEPHONE ENCOUNTER
Patient called and she would like to know if she can take Lasix 40 mg one day , and take torsemide 20 mg. She believes this will work for her more.      She can be reached at [phone]    Thanks

## 2022-09-12 ENCOUNTER — TELEPHONE (OUTPATIENT)
Dept: CARDIOLOGY | Facility: CLINIC | Age: 77
End: 2022-09-12

## 2022-09-12 NOTE — TELEPHONE ENCOUNTER
Patient called and stated that she would like some samples of Xarelto 20 mg.  I have called her and let her know that she can come by and get them..    Halley

## 2022-11-02 DIAGNOSIS — I50.41 ACUTE COMBINED SYSTOLIC AND DIASTOLIC HEART FAILURE: ICD-10-CM

## 2022-11-02 DIAGNOSIS — E66.01 MORBID OBESITY, UNSPECIFIED OBESITY TYPE: ICD-10-CM

## 2022-11-02 RX ORDER — METOPROLOL SUCCINATE 100 MG/1
100 TABLET, EXTENDED RELEASE ORAL 2 TIMES DAILY
Qty: 180 TABLET | Refills: 3 | Status: SHIPPED | OUTPATIENT
Start: 2022-11-02

## 2022-12-12 ENCOUNTER — TELEPHONE (OUTPATIENT)
Dept: CARDIOLOGY | Facility: CLINIC | Age: 77
End: 2022-12-12

## 2022-12-12 ENCOUNTER — OFFICE VISIT (OUTPATIENT)
Dept: CARDIOLOGY | Facility: CLINIC | Age: 77
End: 2022-12-12

## 2022-12-12 VITALS
DIASTOLIC BLOOD PRESSURE: 72 MMHG | HEART RATE: 99 BPM | BODY MASS INDEX: 52.05 KG/M2 | HEIGHT: 65 IN | SYSTOLIC BLOOD PRESSURE: 136 MMHG

## 2022-12-12 DIAGNOSIS — I42.8 NICM (NONISCHEMIC CARDIOMYOPATHY): ICD-10-CM

## 2022-12-12 DIAGNOSIS — Z66 DNR (DO NOT RESUSCITATE): ICD-10-CM

## 2022-12-12 DIAGNOSIS — I50.23 ACUTE ON CHRONIC SYSTOLIC CONGESTIVE HEART FAILURE: ICD-10-CM

## 2022-12-12 DIAGNOSIS — E66.01 MORBID OBESITY: ICD-10-CM

## 2022-12-12 DIAGNOSIS — I48.19 PERSISTENT ATRIAL FIBRILLATION: Primary | ICD-10-CM

## 2022-12-12 DIAGNOSIS — I10 ESSENTIAL HYPERTENSION: ICD-10-CM

## 2022-12-12 PROCEDURE — 93000 ELECTROCARDIOGRAM COMPLETE: CPT | Performed by: INTERNAL MEDICINE

## 2022-12-12 PROCEDURE — 99214 OFFICE O/P EST MOD 30 MIN: CPT | Performed by: INTERNAL MEDICINE

## 2022-12-12 NOTE — TELEPHONE ENCOUNTER
Patient seen in the office today by Dr. Martines and is being started on torsemide.     He would like her to have BMP drawn in 1 week.    Patient requested to have that drawn at Dr. Diaz's office in [place].    Lab order faxed to Dr. Diaz's office at [phone].    Asked that results be faxed to us at [phone].

## 2022-12-12 NOTE — PROGRESS NOTES
Date of Office Visit: 2022  Encounter Provider: Lloyd Martines MD  Place of Service: [facility] CARDIOLOGY  Patient Name: Unique Talavera  : 1945    Subjective:     Encounter Date:2022      Patient ID: Unique Talavera is a 77 y.o. female who has a cc of  NICM and morbid obesity and is wheelchair bound and is recovering from shingles on the face and she is here for fu.    She is very swollen and in a wheelchair and soa and has krishnan.     She has cut back on her diuretics.     She takes vitamins.     There have been no hospital admission since the last visit.     There have been no bleeding events.       Past Medical History:   Diagnosis Date   • Acute on chronic congestive heart failure (HCC)    • Afib (HCC)    • Arthritis    • Atrial fibrillation status post cardioversion (HCC) 04/15/2016   • Cardiomyopathy (HCC)    • Chronic systolic congestive heart failure (HCC)    • Coronary artery disease    • Daytime sleepiness    • Depression    • Dizziness    • Hypertension    • Leukocytosis    • NICM (nonischemic cardiomyopathy) (HCC)    • Obesity     morbid   • Permanent atrial fibrillation (HCC)    • Persistent atrial fibrillation (HCC)    • Vertigo        Social History     Socioeconomic History   • Marital status:    Tobacco Use   • Smoking status: Former     Types: Cigarettes     Quit date: 1972     Years since quittin.9   • Smokeless tobacco: Never   Vaping Use   • Vaping Use: Never used   Substance and Sexual Activity   • Alcohol use: Not Currently     Comment: caffeine use: 3 cups daily    • Drug use: No   • Sexual activity: Defer       Family History   Problem Relation Age of Onset   • Heart disease Mother    • Stroke Father        Review of Systems   Constitutional: Negative for fever and night sweats.   HENT: Negative for ear pain and stridor.    Eyes: Negative for discharge and visual halos.   Cardiovascular: Negative for cyanosis.   Respiratory: Negative for  "hemoptysis and sputum production.    Hematologic/Lymphatic: Negative for adenopathy.   Skin: Negative for nail changes and unusual hair distribution.   Musculoskeletal: Positive for arthritis and joint pain. Negative for gout and joint swelling.   Gastrointestinal: Negative for bowel incontinence and flatus.   Genitourinary: Negative for dysuria and flank pain.   Neurological: Negative for seizures and tremors.   Psychiatric/Behavioral: Negative for altered mental status. The patient is not nervous/anxious.             Objective:     Vitals:    12/12/22 0906   BP: 136/72   Pulse: 99   Height: 165.1 cm (65\")         Eyes:      General:         Right eye: No discharge.         Left eye: No discharge.   HENT:      Head: Normocephalic and atraumatic.   Neck:      Thyroid: No thyromegaly.      Vascular: No JVD.   Pulmonary:      Effort: Pulmonary effort is normal.      Breath sounds: Normal breath sounds. No rales.   Cardiovascular:      Normal rate. Irregularly irregular rhythm.      No gallop.   Edema:     Peripheral edema absent.   Abdominal:      General: Bowel sounds are normal.      Palpations: Abdomen is soft.      Tenderness: There is no abdominal tenderness.   Musculoskeletal: Normal range of motion.         General: No deformity. Skin:     General: Skin is warm and dry.      Findings: No erythema.   Neurological:      Mental Status: Alert and oriented to person, place, and time.      Motor: Normal muscle tone.   Psychiatric:         Behavior: Behavior normal.         Thought Content: Thought content normal.           ECG 12 Lead    Date/Time: 12/12/2022 9:37 AM  Performed by: Lloyd Martines MD  Authorized by: Lloyd Martines MD   Comparison: compared with previous ECG   Similar to previous ECG  Rhythm: atrial fibrillation  Other findings: non-specific ST-T wave changes            Lab Review:       Assessment:          Diagnosis Plan   1. Persistent atrial fibrillation (HCC)        2. NICM (nonischemic " cardiomyopathy) (HCC)        3. DNR (do not resuscitate)        4. Acute on chronic systolic congestive heart failure (HCC)        5. Essential hypertension        6. Morbid obesity (HCC)               Plan:     She is crying and wants me to admit her to the hospital.     She has been Dr. Diaz.     She lives alone and has family help her.     She has reduced the diuretic.     She does have oxygen home but has not used it.;     Family here and we had a long conversation.     I think the best compromise now is to go back to torsemide bid and labs and heart failure clinic

## 2022-12-28 ENCOUNTER — HOSPITAL ENCOUNTER (OUTPATIENT)
Dept: CARDIOLOGY | Facility: HOSPITAL | Age: 77
Discharge: HOME | End: 2022-12-28
Payer: MEDICARE

## 2022-12-28 ENCOUNTER — LAB (OUTPATIENT)
Dept: LAB | Facility: HOSPITAL | Age: 77
End: 2022-12-28
Payer: MEDICARE

## 2022-12-28 VITALS
SYSTOLIC BLOOD PRESSURE: 140 MMHG | BODY MASS INDEX: 52.05 KG/M2 | OXYGEN SATURATION: 97 % | DIASTOLIC BLOOD PRESSURE: 70 MMHG | HEART RATE: 67 BPM | HEIGHT: 65 IN

## 2022-12-28 DIAGNOSIS — E66.01 MORBID OBESITY: ICD-10-CM

## 2022-12-28 DIAGNOSIS — Z13.1 DIABETES MELLITUS SCREENING: ICD-10-CM

## 2022-12-28 DIAGNOSIS — I42.8 NICM (NONISCHEMIC CARDIOMYOPATHY): ICD-10-CM

## 2022-12-28 DIAGNOSIS — I50.23 ACUTE ON CHRONIC SYSTOLIC CONGESTIVE HEART FAILURE: ICD-10-CM

## 2022-12-28 DIAGNOSIS — Z66 DNR (DO NOT RESUSCITATE): ICD-10-CM

## 2022-12-28 DIAGNOSIS — Z13.1 SCREENING FOR DIABETES MELLITUS: ICD-10-CM

## 2022-12-28 DIAGNOSIS — I27.20 PULMONARY HYPERTENSION: Primary | ICD-10-CM

## 2022-12-28 DIAGNOSIS — R79.9 ABNORMAL FINDING OF BLOOD CHEMISTRY, UNSPECIFIED: ICD-10-CM

## 2022-12-28 PROBLEM — I50.9 ACUTE ON CHRONIC CONGESTIVE HEART FAILURE: Status: RESOLVED | Noted: 2022-02-14 | Resolved: 2022-12-28

## 2022-12-28 LAB
ALBUMIN SERPL-MCNC: 3.9 G/DL (ref 3.5–5.2)
ALBUMIN/GLOB SERPL: 1.1 G/DL
ALP SERPL-CCNC: 92 U/L (ref 39–117)
ALT SERPL W P-5'-P-CCNC: 17 U/L (ref 1–33)
ANION GAP SERPL CALCULATED.3IONS-SCNC: 10 MMOL/L (ref 5–15)
AST SERPL-CCNC: 23 U/L (ref 1–32)
BILIRUB SERPL-MCNC: 0.6 MG/DL (ref 0–1.2)
BUN SERPL-MCNC: 28 MG/DL (ref 8–23)
BUN/CREAT SERPL: 27.5 (ref 7–25)
CALCIUM SPEC-SCNC: 9.2 MG/DL (ref 8.6–10.5)
CHLORIDE SERPL-SCNC: 104 MMOL/L (ref 98–107)
CO2 SERPL-SCNC: 27 MMOL/L (ref 22–29)
CREAT SERPL-MCNC: 1.02 MG/DL (ref 0.57–1)
DEPRECATED RDW RBC AUTO: 43.5 FL (ref 37–54)
EGFRCR SERPLBLD CKD-EPI 2021: 56.4 ML/MIN/1.73
ERYTHROCYTE [DISTWIDTH] IN BLOOD BY AUTOMATED COUNT: 12.6 % (ref 12.3–15.4)
GLOBULIN UR ELPH-MCNC: 3.7 GM/DL
GLUCOSE SERPL-MCNC: 105 MG/DL (ref 65–99)
HBA1C MFR BLD: 5.7 % (ref 4.8–5.6)
HCT VFR BLD AUTO: 45 % (ref 34–46.6)
HGB BLD-MCNC: 14.4 G/DL (ref 12–15.9)
MCH RBC QN AUTO: 30.3 PG (ref 26.6–33)
MCHC RBC AUTO-ENTMCNC: 32 G/DL (ref 31.5–35.7)
MCV RBC AUTO: 94.5 FL (ref 79–97)
NT-PROBNP SERPL-MCNC: 1744 PG/ML (ref 0–1800)
PLATELET # BLD AUTO: 217 10*3/MM3 (ref 140–450)
PMV BLD AUTO: 10.5 FL (ref 6–12)
POTASSIUM SERPL-SCNC: 3.9 MMOL/L (ref 3.5–5.2)
PROT SERPL-MCNC: 7.6 G/DL (ref 6–8.5)
RBC # BLD AUTO: 4.76 10*6/MM3 (ref 3.77–5.28)
SODIUM SERPL-SCNC: 141 MMOL/L (ref 136–145)
WBC NRBC COR # BLD: 9.61 10*3/MM3 (ref 3.4–10.8)

## 2022-12-28 PROCEDURE — 83880 ASSAY OF NATRIURETIC PEPTIDE: CPT

## 2022-12-28 PROCEDURE — 80053 COMPREHEN METABOLIC PANEL: CPT

## 2022-12-28 PROCEDURE — 36415 COLL VENOUS BLD VENIPUNCTURE: CPT

## 2022-12-28 PROCEDURE — 83036 HEMOGLOBIN GLYCOSYLATED A1C: CPT

## 2022-12-28 PROCEDURE — 85027 COMPLETE CBC AUTOMATED: CPT

## 2022-12-28 PROCEDURE — G0463 HOSPITAL OUTPT CLINIC VISIT: HCPCS

## 2022-12-28 PROCEDURE — 99214 OFFICE O/P EST MOD 30 MIN: CPT | Performed by: INTERNAL MEDICINE

## 2022-12-28 RX ORDER — POTASSIUM CHLORIDE 1500 MG/1
20 TABLET, FILM COATED, EXTENDED RELEASE ORAL 2 TIMES DAILY
Qty: 60 TABLET | Refills: 0 | Status: SHIPPED | OUTPATIENT
Start: 2022-12-28 | End: 2023-02-03 | Stop reason: SDUPTHER

## 2022-12-28 NOTE — PROGRESS NOTES
Heart Failure & Pulmonary Arterial Hypertension Clinic  [facility]  Michael Sorensen M.D., Ph.D., [facility]       Lloyd Martines MD  [address]  [facility] [address]  [place] [address]    Thank you for asking me to see Unique Talavera for congestive heart failure.    History of Present Illness  This is a 77 y.o. female with:    1. PH and NICM    Unique Talavera is a 77 y.o. female who presents for today for CHF.  The patient is typically seen by Elvis Diaz MD.  Patient's primary cardiologist is Dr. Martines.     The patient presents for a longstanding NICM.  She was diagnosed around 2016 with severe left ventricular failure.  LVEF was around 15%.  Patient coronary angiography revealed no evidence of epicardial disease.  Her LVEF has improved to the 30s.  This has been complicated by pulmonary hypertension and right ventricular dysfunction.  She is currently prescribed Toprol-XL dosed at 100 mg 1 tablet p.o. twice daily and torsemide 40 mg every 12 hours.    She was recently seen in Dr. Martines's office with ADHF.  She had not been using her torsemide as prescribed.  She really wanted to avoid inpatient hospitalization, so she was agreeable to restarting torsemide with BID dosing and enter treatment into the heart failure clinic with program.  She presents today to discuss this further.    Today, she states that she is not interested in a lot of testing, blood work, or possibly medication changes. She is very hesitant to discuss medications. She reports that her last echo was in 2017. She is not interested in repeat TTE. She denies having an A1c recently. She denies DM. She does not have JASON that she knows of. She has not seen sleep medicine. She denies any pulmonary issues. She is taking Torsemide 40mg PO BID. She has not labs, but now states she will take it.       Review of Systems - Review of Systems   Cardiovascular: Positive for dyspnea on exertion and leg swelling. Negative for chest pain,  claudication, cyanosis, irregular heartbeat, near-syncope, orthopnea, palpitations, paroxysmal nocturnal dyspnea and syncope.   Respiratory: Positive for cough, shortness of breath and snoring. Negative for hemoptysis, sleep disturbances due to breathing, sputum production and wheezing.    All other systems reviewed and are negative.       All other systems were reviewed and were negative.    Patient Active Problem List   Diagnosis   • Persistent atrial fibrillation (HCC)   • Arthritis   • Morbid obesity (HCC)   • NICM (nonischemic cardiomyopathy) (HCC)   • Vertigo   • Daytime sleepiness   • Acute on chronic systolic congestive heart failure (HCC)   • DNR (do not resuscitate)   • Essential hypertension       family history includes Heart disease in her mother; Stroke in her father.     reports that she quit smoking about 51 years ago. Her smoking use included cigarettes. She has never used smokeless tobacco. She reports that she does not currently use alcohol. She reports that she does not use drugs.    No Known Allergies      Current Outpatient Medications:   •  metoprolol succinate XL (TOPROL-XL) 100 MG 24 hr tablet, Take 1 tablet by mouth 2 (Two) Times a Day., Disp: 180 tablet, Rfl: 3  •  rivaroxaban (Xarelto) 20 MG tablet, Take 1 tablet by mouth Daily With Dinner., Disp: 28 tablet, Rfl: 0  •  torsemide 40 MG tablet, Take 40 mg by mouth Every 12 (Twelve) Hours., Disp: 60 tablet, Rfl: 1  •  empagliflozin (Jardiance) 10 MG tablet tablet, Take 1 tablet by mouth Daily for 7 days., Disp: 7 tablet, Rfl: 0      Physical Exam:  I have reviewed the patient's current vital signs as documented in the patient's EMR.   Vitals:    12/28/22 0956   BP: 140/70   Pulse: 67   SpO2: 97%     Body mass index is 52.05 kg/m².       12/28/22  0956   Weight: (Unable to weigh)      Pulse Pressure: high  Pulse Ox: Normal  on room air  General: alert, appears stated age and cooperative     Body Habitus: obese    HEENT: Head: Normocephalic,  no lesions, without obvious abnormality. No arcus senilis, xanthelasma or xanthomas.  No malar flush, proptosis, xanthomas or malar flush.   Neuro: alert, oriented x3  speech normal in context and clarity  memory intact grossly  JVP: Volume/Pulsation: Elevated to 14cm of water. Normal x and y descent.  Waveforms: V waves are normal.    Physiology: Appropriate inspiratory decrease.  No Kussmaul's. No Ellen's.      Pulmonary:Normal     Precordium: Normal impulses. P2 is not palpable.  RV Heave: absent  LV Heave: absent  [place]:  normal size and placement  Palpable S4: absent.  Heart rate: normal    Heart Rhythm: irregularly irregular     Heart Sounds: S1: normal  S2: normal intensity  S3: absent   S4: absent  Opening Snap: absent    A2-OS:  no  Pericardial Rub:  Absent: Yes     Ejection click: None      Murmurs:  absent   Extremity: moves all extremities equally.     DATA REVIEWED:     ---------------------------------------------------  TTE/TOMASA:  Results for orders placed during the hospital encounter of 08/04/17    Adult Transthoracic Echo Complete With Contrast    Interpretation Summary  · Left ventricular systolic function is moderately decreased. Calculated EF = 36.9%. Estimated EF was in agreement with the calculated EF. There is left ventricular global hypokinesis noted. The left ventricular cavity is moderately dilated. Left ventricular diastolic was unable to be assessed.  · Right ventricular cavity is mildly dilated. Mildly reduced right ventricular systolic function noted.  · Left atrial cavity size is moderately dilated.  · Estimated right ventricular systolic pressure from tricuspid regurgitation is mildly elevated (35-45 mmHg). Calculated right ventricular systolic pressure from tricuspid regurgitation is 37 mmHg.      My interpretation of the TTE is below.   LVEF is 37%.  LV cavity is moderately dilated.  RV cavity is dilated with reduced systolic function.  Pulmonary  hypertension.  -----------------------------------------------------      Laboratory evaluations:    Lab Results   Component Value Date    GLUCOSE 101 (H) 02/19/2022    BUN 23 02/19/2022    CREATININE 0.97 02/19/2022    EGFRIFNONA 56 (L) 02/19/2022    BCR 23.7 02/19/2022    K 3.8 02/19/2022    CO2 34.8 (H) 02/19/2022    CALCIUM 8.6 02/19/2022    ALBUMIN 4.00 02/14/2022    AST 27 02/14/2022    ALT 11 02/14/2022     Lab Results   Component Value Date    WBC 8.37 02/15/2022    HGB 13.3 02/15/2022    HCT 42.4 02/15/2022    MCV 94.0 02/15/2022     02/15/2022     Lab Results   Component Value Date    CHOL 105 07/21/2016    TRIG 63 07/21/2016    HDL 42 07/21/2016    LDL 50 07/21/2016     Lab Results   Component Value Date    TSH 5.520 (H) 04/11/2016     Lab Results   Component Value Date    TROPONINT <0.010 02/14/2022     Lab Results   Component Value Date    HGBA1C 5.80 (H) 07/21/2016     No results found for: DDIMER  Lab Results   Component Value Date    ALT 11 02/14/2022     Lab Results   Component Value Date    HGBA1C 5.80 (H) 07/21/2016     Lab Results   Component Value Date    CREATININE 0.97 02/19/2022     No results found for: IRON, TIBC, FERRITIN  Lab Results   Component Value Date    INR 1.12 (H) 04/11/2016    PROTIME 14.2 04/11/2016     Assessment & Plan       PAH RISK ASSESSMENT:        1. Pulmonary hypertension (HCC)    2. NICM (nonischemic cardiomyopathy) (HCC)/ADHF    3. Morbid obesity (HCC).   4. Diabetes mellitus screening.     5. DNR (do not resuscitate)    6. Likely JASON       1.  She presents today with WHO-group-2/3 likely secondary pulmonary hypertension.  I suspect this is primarily from her NICM, likely restrictive lung physiology from obesity, likely untreated JASON.  She is not interested in any work-ups at this time.  Her last 2D echocardiogram was in 2017.  For now, we will focus on medical management of her cardiomyopathy.    2.  She presents today with a NICM/DCM.  She is NYHA stage C;  functional class III.  Today, she is hypervolemic, but perfused.  We had an extended discussion today about palliative management in the clinic.  She certainly wants to avoid inpatient admissions.  She is not interested at this time and a assessment of her LVEF.  I did discuss with her today that we could focus on palliative issues and fluid management.  I did ask her to think about considering a LVEF that even if it is a POCUS in the office.  Certainly, if her LVEF remains diminished, and he become agreeable, we could discuss CHF-specific medications.    I have asked her to continue her torsemide and Toprol-XL.  We will send basic labs today.  Will START Jardiance 10 mg.  70 samples were provided.  Risks and benefits were discussed.  Handout was provided.  She will consider having a POCUS done at her next appointment to possibly discuss CHF-specific medications.    3. The BMI is Body mass index is 52.05 kg/m². ACC/AHA guidelines recommend that height, weight and BMI is calculated at visits.  The patient has been provided with information regarding the elevated risk of cardiovascular disease, type 2 diabetes and all-cause mortality.  Sustained weight loss of 3-5% is likely to result in clinically meaningful reductions in triglycerides, hemoglobin A1c, blood glucose, and the risk of developing type 2 diabetes.  The greater the amount of weight loss typically the greater reduction in blood pressure, reduction and need for medications to control blood pressure, improvements in blood glucose and lipids.  Handout was provided on the patient's BMI.  The patient has been encouraged to follow-up with their primary care provider.    4.  Send hemoglobin A1c    5. DNR/DNI noted. Will need goals of care discussion at next appointment.    6.  I did advise her on suspicious he has JASON and this is likely contributed to her pulmonary pressures.  She is not interested in seeing sleep medicine.        ORDERS PLACED TODAY:  Orders  Placed This Encounter   Procedures   • Comprehensive Metabolic Panel   • CBC (No Diff)   • proBNP   • Hemoglobin A1c          MEDS ORDERED TODAY:    New Medications Ordered This Visit   Medications   • empagliflozin (Jardiance) 10 MG tablet tablet     Sig: Take 1 tablet by mouth Daily for 7 days.     Dispense:  7 tablet     Refill:  0          Return in about 1 week (around 1/4/2023).          This document has been electronically signed by Michael Sorensen MD PhD on December 28, 2022 10:45 EST      Michael Sorensen M.D., Ph.D., [facility]  System Medical Director for HF and PAH

## 2022-12-29 NOTE — ADDENDUM NOTE
Encounter addended by: Ailyn Acosta MA on: 12/29/2022 8:25 AM   Actions taken: Charge Capture section accepted

## 2023-01-09 ENCOUNTER — HOSPITAL ENCOUNTER (OUTPATIENT)
Dept: CARDIOLOGY | Facility: HOSPITAL | Age: 78
Discharge: HOME | End: 2023-01-09
Admitting: INTERNAL MEDICINE
Payer: MEDICARE

## 2023-01-09 VITALS
OXYGEN SATURATION: 98 % | BODY MASS INDEX: 48.38 KG/M2 | HEIGHT: 65 IN | SYSTOLIC BLOOD PRESSURE: 117 MMHG | HEART RATE: 85 BPM | WEIGHT: 290.4 LBS | DIASTOLIC BLOOD PRESSURE: 71 MMHG

## 2023-01-09 DIAGNOSIS — G47.39 SLEEP APNEA-LIKE BEHAVIOR: ICD-10-CM

## 2023-01-09 DIAGNOSIS — E66.01 MORBID OBESITY: ICD-10-CM

## 2023-01-09 DIAGNOSIS — Z66 DNR (DO NOT RESUSCITATE): ICD-10-CM

## 2023-01-09 DIAGNOSIS — I42.8 NICM (NONISCHEMIC CARDIOMYOPATHY): ICD-10-CM

## 2023-01-09 DIAGNOSIS — I27.20 PULMONARY HYPERTENSION: Primary | ICD-10-CM

## 2023-01-09 PROCEDURE — 99214 OFFICE O/P EST MOD 30 MIN: CPT | Performed by: INTERNAL MEDICINE

## 2023-01-09 PROCEDURE — G0463 HOSPITAL OUTPT CLINIC VISIT: HCPCS

## 2023-01-09 NOTE — PROGRESS NOTES
[facility] Heart Failure Clinic      Patient Name: Unique Talavera  :1945  Age: 77 y.o.  Sex: female  Referring Provider: Nazanin Paige APRN   Primary Cardiologist: Dr. Martines  Encounter Provider: Niru Vyas, MeirD      Chief Complaint:   Chief Complaint   Patient presents with   • Congestive Heart Failure       HPI:  Patient presents for their follow-up visit. PMH is significant for HFrEF (EF 37%), persistent afib, NICM, HTN, PH, JASON, and obesity. She was seen in the clinic for her initial visit a couple of weeks ago where it was found she had not been taking her torsemide. She does need updates to her imaging, i.e. updated echo, however, she is not interested at this time. Palliative care was discussed last time. Her main goal is to avoid hospital admissions. She was advised to restart her torsemide and Jardiance was initiated. She states she tolerated the Jardiance well and actually felt better on it. Otherwise, she is tolerating medications well.      Current Regimen:  Heart Failure  Jardiance 10 mg daily  Metoprolol succinate 100 mg BID  Torsemide 40 mg BID      Other CV Meds  KCl 20 mEq BID  Xarelto 20 mg daily      Medication Use:  Adherence: Good. Missed 1-2 doses in the last week. Barriers for adherence include: none  Past hx of medication use/intolerance:   Affordability: Medicare Advantage      Diet:  Defer in-depth discussion to future visit      Social History:  Tobacco use: former smoker, quit 1972  EtOH use: none  Illicit drug use: no history of illicit drug use  Exercise: limited physical activity due to health/physical limitations      Immunization Status:  Pneumococcal: PCV20 due  Influenza: due, refused  COVID-19: 21      OBJECTIVE:    /71 (BP Location: Left arm, Patient Position: Sitting)   Pulse 85   Ht 165.1 cm (65\")   Wt 132 kg (290 lb 6.4 oz)   SpO2 98%   BMI 48.33 kg/m²     Body mass index is 48.33 kg/m².  Wt Readings from Last 1 Encounters:    01/09/23 132 kg (290 lb 6.4 oz)       Lab Review:  Renal Function: Estimated Creatinine Clearance: 62.4 mL/min (A) (by C-G formula based on SCr of 1.02 mg/dL (H)).    Lab Results   Component Value Date    PROBNP 1,744.0 12/28/2022       Results for orders placed during the hospital encounter of 08/04/17    Adult Transthoracic Echo Complete With Contrast    Interpretation Summary  · Left ventricular systolic function is moderately decreased. Calculated EF = 36.9%. Estimated EF was in agreement with the calculated EF. There is left ventricular global hypokinesis noted. The left ventricular cavity is moderately dilated. Left ventricular diastolic was unable to be assessed.  · Right ventricular cavity is mildly dilated. Mildly reduced right ventricular systolic function noted.  · Left atrial cavity size is moderately dilated.  · Estimated right ventricular systolic pressure from tricuspid regurgitation is mildly elevated (35-45 mmHg). Calculated right ventricular systolic pressure from tricuspid regurgitation is 37 mmHg.        ASSESSMENT/PLAN OF CARE:    1. HFrEF (EF 37%)  • She tolerated the Jardiance well. She would like to continue current medications but would not like to start anything new at this time. Palliative care has been discussed  • Continue metoprolol succinate 100 mg twice daily. Goal heart rate is 50s to 60s. HR is not at goal. Patient's dose is at target dose  • Continue Jardiance 10 mg daily. Provided patient with sample and will apply for PAP  • Continue torsemide 40 mg twice daily  • Advised patient to call clinic with 2-3 lb weight gain in 24 hrs or >5 lb weight gain in 1 week        Thank you for allowing me to participate in the care of your patient,         Niru Vyas, PharmD  [facility] Heart Failure Clinic  01/09/23  10:30 EST

## 2023-01-09 NOTE — PROGRESS NOTES
Heart Failure & Pulmonary Arterial Hypertension Clinic  [facility]  Michael Sorensen M.D., Ph.D., [facility]       Nazanin Paige, APRN  [address]  [facility] [address]  [place] [address]    Thank you for asking me to see Unique Talavera for congestive heart failure.    History of Present Illness  This is a 77 y.o. female with:    1. PH and NICM    Unique Talavera is a 77 y.o. female who presents for today for CHF.  The patient is typically seen by Elvis Diaz MD.  Patient's primary cardiologist is Dr. Martines.     The patient presents for a longstanding NICM.  She was diagnosed around 2016 with severe left ventricular failure.  LVEF was around 15%.  Patient coronary angiography revealed no evidence of epicardial disease.  Her LVEF has improved to the 30s.  This has been complicated by pulmonary hypertension and right ventricular dysfunction.  She is currently prescribed Toprol-XL dosed at 100 mg 1 tablet p.o. twice daily and torsemide 40 mg every 12 hours.    She was recently seen in Dr. Martines's office with ADHF.  She had not been using her torsemide as prescribed.  She really wanted to avoid inpatient hospitalization, so she was agreeable to restarting torsemide with BID dosing and enter treatment into the heart failure clinic with program.  She presents today to discuss this further.    The patient returns to clinic today.  Last 2D TTE was in 2017.  She has not been interested in repeating this.  She does not have JASON that she knows of.  She has not seen sleep medicine in the past.  She continues to take torsemide 40 mg tablet.  At her last appointment, we sent labs.   Started on Jardiance.  He was able to get this medicine without any issues.  Denies adverse effects. She started her Jardiance. She didn't note any SEs.         Review of Systems - Review of Systems   Cardiovascular: Positive for dyspnea on exertion and leg swelling. Negative for chest pain, claudication, cyanosis, irregular  heartbeat, near-syncope, orthopnea, palpitations, paroxysmal nocturnal dyspnea and syncope.   Respiratory: Positive for cough, shortness of breath and snoring. Negative for hemoptysis, sleep disturbances due to breathing, sputum production and wheezing.    All other systems reviewed and are negative.       All other systems were reviewed and were negative.    Patient Active Problem List   Diagnosis   • Persistent atrial fibrillation (HCC)   • Arthritis   • Morbid obesity (HCC)   • NICM (nonischemic cardiomyopathy) (HCC)   • Vertigo   • Daytime sleepiness   • Acute on chronic systolic congestive heart failure (HCC)   • DNR (do not resuscitate)   • Essential hypertension   • Pulmonary hypertension (HCC)   • Sleep apnea-like behavior       family history includes Heart disease in her mother; Stroke in her father.     reports that she quit smoking about 51 years ago. Her smoking use included cigarettes. She has never used smokeless tobacco. She reports that she does not currently use alcohol. She reports that she does not use drugs.    No Known Allergies      Current Outpatient Medications:   •  empagliflozin (Jardiance) 10 MG tablet tablet, Take 1 tablet by mouth Daily., Disp: 30 tablet, Rfl: 3  •  metoprolol succinate XL (TOPROL-XL) 100 MG 24 hr tablet, Take 1 tablet by mouth 2 (Two) Times a Day., Disp: 180 tablet, Rfl: 3  •  potassium chloride (K-TAB) 20 MEQ tablet controlled-release ER tablet, Take 1 tablet by mouth 2 (Two) Times a Day. (Patient taking differently: Take 20 mEq by mouth Daily.), Disp: 60 tablet, Rfl: 0  •  rivaroxaban (Xarelto) 20 MG tablet, Take 1 tablet by mouth Daily With Dinner., Disp: 28 tablet, Rfl: 0  •  torsemide 40 MG tablet, Take 40 mg by mouth Every 12 (Twelve) Hours., Disp: 60 tablet, Rfl: 1      Physical Exam:  I have reviewed the patient's current vital signs as documented in the patient's EMR.   Vitals:    01/09/23 1025   BP: 117/71   Pulse: 85   SpO2: 98%     Body mass index is 48.33  kg/m².       01/09/23  1025   Weight: 132 kg (290 lb 6.4 oz)        Physical Exam  Vitals reviewed.   Constitutional:       General: She is not in acute distress.     Appearance: She is obese. She is not ill-appearing, toxic-appearing or diaphoretic.   HENT:      Head: Normocephalic and atraumatic.   Neck:      Vascular: No JVD.      Comments: JVP >12cm of water with mild HJR.  Cardiovascular:      Rate and Rhythm: Normal rate and regular rhythm.      Heart sounds: Normal heart sounds, S1 normal and S2 normal. No murmur heard.   No systolic murmur is present.   No diastolic murmur is present.    No friction rub. No gallop. No S3 or S4 sounds.   Pulmonary:      Effort: Pulmonary effort is normal. No tachypnea, bradypnea, accessory muscle usage or respiratory distress.      Breath sounds: Normal breath sounds. No stridor. No wheezing, rhonchi or rales.   Skin:     General: Skin is warm and dry.   Neurological:      General: No focal deficit present.      Mental Status: She is alert. Mental status is at baseline.   Psychiatric:         Mood and Affect: Mood normal.         Speech: Speech normal.         Behavior: Behavior normal.         Thought Content: Thought content normal.         Cognition and Memory: Cognition and memory normal.         Judgment: Judgment normal.           DATA REVIEWED:     ---------------------------------------------------  TTE/TOMASA:  Results for orders placed during the hospital encounter of 08/04/17    Adult Transthoracic Echo Complete With Contrast    Interpretation Summary  · Left ventricular systolic function is moderately decreased. Calculated EF = 36.9%. Estimated EF was in agreement with the calculated EF. There is left ventricular global hypokinesis noted. The left ventricular cavity is moderately dilated. Left ventricular diastolic was unable to be assessed.  · Right ventricular cavity is mildly dilated. Mildly reduced right ventricular systolic function noted.  · Left atrial cavity  size is moderately dilated.  · Estimated right ventricular systolic pressure from tricuspid regurgitation is mildly elevated (35-45 mmHg). Calculated right ventricular systolic pressure from tricuspid regurgitation is 37 mmHg.      My interpretation of the TTE is below.     LVEF is 37% LV cavity is moderately dilated.  RV cavity is dilated with reduced systolic function.   -----------------------------------------------------      Laboratory evaluations:    Lab Results   Component Value Date    GLUCOSE 105 (H) 12/28/2022    BUN 28 (H) 12/28/2022    CREATININE 1.02 (H) 12/28/2022    EGFRIFNONA 56 (L) 02/19/2022    BCR 27.5 (H) 12/28/2022    K 3.9 12/28/2022    CO2 27.0 12/28/2022    CALCIUM 9.2 12/28/2022    ALBUMIN 3.9 12/28/2022    AST 23 12/28/2022    ALT 17 12/28/2022     Lab Results   Component Value Date    WBC 9.61 12/28/2022    HGB 14.4 12/28/2022    HCT 45.0 12/28/2022    MCV 94.5 12/28/2022     12/28/2022     Lab Results   Component Value Date    CHOL 105 07/21/2016    TRIG 63 07/21/2016    HDL 42 07/21/2016    LDL 50 07/21/2016     Lab Results   Component Value Date    TSH 5.520 (H) 04/11/2016     Lab Results   Component Value Date    TROPONINT <0.010 02/14/2022     Lab Results   Component Value Date    HGBA1C 5.70 (H) 12/28/2022     No results found for: DDIMER  Lab Results   Component Value Date    ALT 17 12/28/2022     Lab Results   Component Value Date    HGBA1C 5.70 (H) 12/28/2022    HGBA1C 5.80 (H) 07/21/2016     Lab Results   Component Value Date    CREATININE 1.02 (H) 12/28/2022     No results found for: IRON, TIBC, FERRITIN  Lab Results   Component Value Date    INR 1.12 (H) 04/11/2016    PROTIME 14.2 04/11/2016     Assessment & Plan       PAH RISK ASSESSMENT:        1. Pulmonary hypertension (HCC)    2. NICM (nonischemic cardiomyopathy) (HCC)    3. DNR (do not resuscitate)    4. Morbid obesity (HCC)    5. Sleep apnea-like behavior        1.  She presents today with WHO-group-2/3 likely  secondary pulmonary hypertension.  I suspect this is from her  NICM, restrictive lung physiology from obesity, and untreated/undiagnosed JASON.  Her last 2D echocardiogram was in 2017.  She continues to report that she is not interested in any additional work-up.  For now, I have encouraged her to consider outpatient sleep consultation.      2.  She presents today with a NICM/DCM.  NYHA stage C; functional class III.  Today, she remains hypervolemic, but perfused.  She wants to avoid inpatient admissions.  She does not want to do a 2D TTE to evaluate her LVEF.  We will continue palliative therapy at that point.  For now, continue torsemide, Toprol-XL, and Jardiance.  She does know that we need further evaluation of her LVEF to optimize her medication management.  Fluid and sodium restriction emphasized.  Call for concerning symptoms.  Otherwise, continue treatment of her cardiomyopathy, as below. I discussed this with her and her son. She may consider a 2D TTE at her next appt.     3. DNR/DNI.    4.  Handout provided.    5.  Strongly encouraged outpatient sleep consultation.  She has declined.      ORDERS PLACED TODAY:  No orders of the defined types were placed in this encounter.         MEDS ORDERED TODAY:    New Medications Ordered This Visit   Medications   • empagliflozin (Jardiance) 10 MG tablet tablet     Sig: Take 1 tablet by mouth Daily.     Dispense:  30 tablet     Refill:  3          Return in about 2 months (around 3/9/2023).        Michael Sorensen M.D., Ph.D., [facility]  System Medical Director for HF and PAH

## 2023-01-09 NOTE — ADDENDUM NOTE
Encounter addended by: Ailyn Acosta MA on: 1/9/2023 2:29 PM   Actions taken: Charge Capture section accepted

## 2023-01-19 ENCOUNTER — TELEPHONE (OUTPATIENT)
Dept: CARDIOLOGY | Facility: HOSPITAL | Age: 78
End: 2023-01-19
Payer: MEDICARE

## 2023-01-19 NOTE — TELEPHONE ENCOUNTER
Called patient and received her voicemail.  Left voicemail stating that a proof of income is being requested by [employer] for her PAP application for her Jardiance medication.    Will wait for patient to return call.    Thank you,  Rica LEAL [facility]

## 2023-01-31 ENCOUNTER — TELEPHONE (OUTPATIENT)
Dept: CARDIOLOGY | Facility: HOSPITAL | Age: 78
End: 2023-01-31
Payer: MEDICARE

## 2023-01-31 NOTE — TELEPHONE ENCOUNTER
Patient called to clarify proof of income needed for Jardiance [employer] P[employer] application. She is going to try to have her son drop it off. Otherwise, she will plan to mail it in. She will contact us if she needs samples in the interim. No further questions at this time.    Niru Vyas, PharmD, BCACP  [facility]  Phone: [phone]

## 2023-02-03 ENCOUNTER — TELEPHONE (OUTPATIENT)
Dept: CARDIOLOGY | Facility: HOSPITAL | Age: 78
End: 2023-02-03
Payer: MEDICARE

## 2023-02-03 RX ORDER — POTASSIUM CHLORIDE 1500 MG/1
20 TABLET, FILM COATED, EXTENDED RELEASE ORAL 2 TIMES DAILY
Qty: 60 TABLET | Refills: 0 | Status: SHIPPED | OUTPATIENT
Start: 2023-02-03 | End: 2023-03-01

## 2023-02-03 NOTE — TELEPHONE ENCOUNTER
Patient called this morning. She stated that she is close to being out of her Potassium and she is completely out of her Torsemide.    Rica LEAL C

## 2023-02-07 ENCOUNTER — TELEPHONE (OUTPATIENT)
Dept: CARDIOLOGY | Facility: HOSPITAL | Age: 78
End: 2023-02-07
Payer: MEDICARE

## 2023-02-07 NOTE — TELEPHONE ENCOUNTER
Called and spoke with patient. I let her know that she has been Approved for the patient assistance program with  [employer] for her Jardiance medication. She will receive the medication in the mail for free, all year until December 31, 2023 and we will do re-enrollment before the end of the year.    Patient verbalized understanding, No questions at this time.    Thank you,  Rica LEAL [facility]

## 2023-03-01 RX ORDER — POTASSIUM CHLORIDE 1500 MG/1
TABLET, FILM COATED, EXTENDED RELEASE ORAL
Qty: 60 TABLET | Refills: 0 | Status: SHIPPED | OUTPATIENT
Start: 2023-03-01 | End: 2023-04-03

## 2023-03-06 ENCOUNTER — HOSPITAL ENCOUNTER (OUTPATIENT)
Dept: CARDIOLOGY | Facility: HOSPITAL | Age: 78
Discharge: HOME | End: 2023-03-06
Admitting: INTERNAL MEDICINE
Payer: MEDICARE

## 2023-03-06 VITALS
DIASTOLIC BLOOD PRESSURE: 73 MMHG | HEART RATE: 85 BPM | OXYGEN SATURATION: 96 % | BODY MASS INDEX: 47.45 KG/M2 | SYSTOLIC BLOOD PRESSURE: 124 MMHG | HEIGHT: 65 IN | WEIGHT: 284.8 LBS

## 2023-03-06 DIAGNOSIS — I42.8 NICM (NONISCHEMIC CARDIOMYOPATHY): ICD-10-CM

## 2023-03-06 DIAGNOSIS — G47.39 SLEEP APNEA-LIKE BEHAVIOR: ICD-10-CM

## 2023-03-06 DIAGNOSIS — I27.20 PULMONARY HYPERTENSION: Primary | ICD-10-CM

## 2023-03-06 DIAGNOSIS — Z66 DNR (DO NOT RESUSCITATE): ICD-10-CM

## 2023-03-06 PROBLEM — I50.23 ACUTE ON CHRONIC SYSTOLIC CONGESTIVE HEART FAILURE (HCC): Status: RESOLVED | Noted: 2019-10-25 | Resolved: 2023-03-06

## 2023-03-06 PROCEDURE — G0463 HOSPITAL OUTPT CLINIC VISIT: HCPCS

## 2023-03-06 PROCEDURE — 99214 OFFICE O/P EST MOD 30 MIN: CPT | Performed by: INTERNAL MEDICINE

## 2023-03-06 NOTE — ADDENDUM NOTE
Encounter addended by: Ailyn Acosta MA on: 3/6/2023 12:17 PM   Actions taken: Charge Capture section accepted

## 2023-03-06 NOTE — PROGRESS NOTES
Heart Failure & Pulmonary Arterial Hypertension Clinic  [facility]  Michael Sorensen M.D., Ph.D., [facility]       Lloyd Martines MD  [address] [facility] [address]  [place] [address]    Thank you for asking me to see Unique Talavera for congestive heart failure.    History of Present Illness  This is a 77 y.o. female with:    1. PH and NICM    Unique Talavera is a 77 y.o. female who presents for today for CHF.  The patient is typically seen by Elvis Diaz MD.  Patient's primary cardiologist is Dr. Martines.     The patient presents for a longstanding NICM.  She was diagnosed around 2016 with severe left ventricular failure.  LVEF was around 15%.  Patient coronary angiography revealed no evidence of epicardial disease.  Her LVEF has improved to the 30s.  This has been complicated by pulmonary hypertension and right ventricular dysfunction.  She is currently prescribed Toprol-XL dosed at 100 mg 1 tablet p.o. twice daily and torsemide 40 mg every 12 hours.    The patient returns to clinic today.  The patient does continue to take torsemide 40 mg daily.  She is now on Jardiance.  She is able to afford this medication.  She denies adverse effects.  Stable exercise intolerance.  Denies emergency department visits or inpatient admissions. She has lost 6 pounds.       Review of Systems - Review of Systems   Cardiovascular: Positive for dyspnea on exertion and leg swelling. Negative for chest pain, claudication, cyanosis, irregular heartbeat, near-syncope, orthopnea, palpitations, paroxysmal nocturnal dyspnea and syncope.   Respiratory: Positive for cough, shortness of breath and snoring. Negative for hemoptysis, sleep disturbances due to breathing, sputum production and wheezing.    All other systems reviewed and are negative.       All other systems were reviewed and were negative.    Patient Active Problem List   Diagnosis   • Persistent atrial fibrillation (HCC)   • Arthritis   • Morbid obesity (HCC)   •  NICM (nonischemic cardiomyopathy) (HCC)   • Vertigo   • Daytime sleepiness   • DNR (do not resuscitate)   • Essential hypertension   • Pulmonary hypertension (HCC)   • Sleep apnea-like behavior       family history includes Heart disease in her mother; Stroke in her father.     reports that she quit smoking about 51 years ago. Her smoking use included cigarettes. She has never used smokeless tobacco. She reports that she does not currently use alcohol. She reports that she does not use drugs.    No Known Allergies      Current Outpatient Medications:   •  empagliflozin (Jardiance) 10 MG tablet tablet, Take 1 tablet by mouth Daily., Disp: 30 tablet, Rfl: 3  •  metoprolol succinate XL (TOPROL-XL) 100 MG 24 hr tablet, Take 1 tablet by mouth 2 (Two) Times a Day., Disp: 180 tablet, Rfl: 3  •  potassium chloride ER (K-TAB) 20 MEQ tablet controlled-release ER tablet, TAKE ONE TABLET BY MOUTH TWICE A DAY, Disp: 60 tablet, Rfl: 0  •  rivaroxaban (Xarelto) 20 MG tablet, Take 1 tablet by mouth Daily With Dinner., Disp: 28 tablet, Rfl: 0  •  Torsemide 40 MG tablet, Take 40 mg by mouth Every 12 (Twelve) Hours., Disp: 60 tablet, Rfl: 1      Physical Exam:  I have reviewed the patient's current vital signs as documented in the patient's EMR.   Vitals:    03/06/23 0900   BP: 124/73   Pulse: 85   SpO2: 96%     Body mass index is 47.39 kg/m².       03/06/23  0900   Weight: 129 kg (284 lb 12.8 oz)        Physical Exam  Vitals reviewed.   Constitutional:       General: She is not in acute distress.     Appearance: She is obese. She is not ill-appearing, toxic-appearing or diaphoretic.   HENT:      Head: Normocephalic and atraumatic.   Neck:      Vascular: Hepatojugular reflux present. No JVD.      Comments: JVP >10 cm of water with mild HJR.  Cardiovascular:      Rate and Rhythm: Normal rate and regular rhythm.      Heart sounds: Normal heart sounds, S1 normal and S2 normal. No murmur heard.   No systolic murmur is present.   No  diastolic murmur is present.    No friction rub. No gallop. No S3 or S4 sounds.   Pulmonary:      Effort: Pulmonary effort is normal. No tachypnea, bradypnea, accessory muscle usage or respiratory distress.      Breath sounds: Normal breath sounds. No stridor. No wheezing, rhonchi or rales.   Skin:     General: Skin is warm and dry.   Neurological:      General: No focal deficit present.      Mental Status: She is alert. Mental status is at baseline.   Psychiatric:         Mood and Affect: Mood normal.         Speech: Speech normal.         Behavior: Behavior normal.         Thought Content: Thought content normal.         Cognition and Memory: Cognition and memory normal.         Judgment: Judgment normal.           DATA REVIEWED:     ---------------------------------------------------  TTE/TOMASA:  Results for orders placed during the hospital encounter of 08/04/17    Adult Transthoracic Echo Complete With Contrast    Interpretation Summary  · Left ventricular systolic function is moderately decreased. Calculated EF = 36.9%. Estimated EF was in agreement with the calculated EF. There is left ventricular global hypokinesis noted. The left ventricular cavity is moderately dilated. Left ventricular diastolic was unable to be assessed.  · Right ventricular cavity is mildly dilated. Mildly reduced right ventricular systolic function noted.  · Left atrial cavity size is moderately dilated.  · Estimated right ventricular systolic pressure from tricuspid regurgitation is mildly elevated (35-45 mmHg). Calculated right ventricular systolic pressure from tricuspid regurgitation is 37 mmHg.      My interpretation of the TTE is below.     LVEF is 37%.  LV cavity is moderately dilated.  RV cavity is dilated with reduced systolic function.   -----------------------------------------------------      Laboratory evaluations:    Lab Results   Component Value Date    GLUCOSE 105 (H) 12/28/2022    BUN 28 (H) 12/28/2022    CREATININE  1.02 (H) 12/28/2022    EGFRIFNONA 56 (L) 02/19/2022    BCR 27.5 (H) 12/28/2022    K 3.9 12/28/2022    CO2 27.0 12/28/2022    CALCIUM 9.2 12/28/2022    ALBUMIN 3.9 12/28/2022    AST 23 12/28/2022    ALT 17 12/28/2022     Lab Results   Component Value Date    WBC 9.61 12/28/2022    HGB 14.4 12/28/2022    HCT 45.0 12/28/2022    MCV 94.5 12/28/2022     12/28/2022     Lab Results   Component Value Date    CHOL 105 07/21/2016    TRIG 63 07/21/2016    HDL 42 07/21/2016    LDL 50 07/21/2016     Lab Results   Component Value Date    TSH 5.520 (H) 04/11/2016     Lab Results   Component Value Date    TROPONINT <0.010 02/14/2022     Lab Results   Component Value Date    HGBA1C 5.70 (H) 12/28/2022     No results found for: DDIMER  Lab Results   Component Value Date    ALT 17 12/28/2022     Lab Results   Component Value Date    HGBA1C 5.70 (H) 12/28/2022    HGBA1C 5.80 (H) 07/21/2016     Lab Results   Component Value Date    CREATININE 1.02 (H) 12/28/2022     No results found for: IRON, TIBC, FERRITIN  Lab Results   Component Value Date    INR 1.12 (H) 04/11/2016    PROTIME 14.2 04/11/2016     Assessment & Plan       PAH RISK ASSESSMENT:        1. Pulmonary hypertension (HCC)    2. NICM (nonischemic cardiomyopathy) (HCC)    3. DNR (do not resuscitate)    4. Sleep apnea-like behavior        1.  She presents today with a likely WHO-group-2/3 secondary pulmonary hypertension.  I suspect this is from her  NICM, restrictive lung physiology from obesity, and likely untreated/undiagnosed JASON.  Unfortunately, she has developed right ventricular involvement.  Her last 2D TTE was in 2017.  At her last few appointments she has refused sleep consultation.  She has refused updating her 2D TTE.  For now, I have encouraged her to continue optimization of her HFrEF regimen.    2.  She presents today with a NICM/DCM.  NYHA stage C; functional class III.  Today, she remains hypervolemic, but perfused.  Her main goal is palliative therapy  and to avoid hospital admissions.  For now, she will continue torsemide, Toprol-XL, and Jardiance. We also could be more aggressive with her diuresis, but she is not interested in checking repeat labs, which would be necessary for IV diuretics, or metolazone.  I discussed with her several times, including today, the need for a 2D TTE to evaluate her biventricular function.  Otherwise, continue treatment of her cardiomyopathy.    3. DNR/DNI. Noted again today.    4.  Strongly encouraged outpatient sleep referral.  She has declined.    ORDERS PLACED TODAY:  No orders of the defined types were placed in this encounter.         MEDS ORDERED TODAY:    No orders of the defined types were placed in this encounter.         Return in about 3 months (around 6/6/2023).        This document has been electronically signed by Michael Sorensen MD PhD on March 6, 2023 09:13 EST          Michael Sorensen M.D., Ph.D., [facility]  System Medical Director for HF and PAH

## 2023-04-03 RX ORDER — TORSEMIDE 20 MG/1
TABLET ORAL
Qty: 120 TABLET | Refills: 0 | Status: SHIPPED | OUTPATIENT
Start: 2023-04-03

## 2023-04-03 RX ORDER — POTASSIUM CHLORIDE 1500 MG/1
TABLET, FILM COATED, EXTENDED RELEASE ORAL
Qty: 60 TABLET | Refills: 0 | Status: SHIPPED | OUTPATIENT
Start: 2023-04-03

## 2023-05-08 RX ORDER — POTASSIUM CHLORIDE 1500 MG/1
TABLET, FILM COATED, EXTENDED RELEASE ORAL
Qty: 60 TABLET | Refills: 0 | Status: SHIPPED | OUTPATIENT
Start: 2023-05-08

## 2023-05-08 NOTE — TELEPHONE ENCOUNTER
Last Office Visit: 03/06/2023  Labs: 12/28/2022  Next Follow-up appointment: 07/25/2023        Cabrera Bacon DNP, MSN, RN  RN Clinical Coordinator  [facility]

## 2023-06-05 RX ORDER — POTASSIUM CHLORIDE 1500 MG/1
TABLET, EXTENDED RELEASE ORAL
Qty: 60 TABLET | Refills: 0 | Status: SHIPPED | OUTPATIENT
Start: 2023-06-05

## 2023-06-05 NOTE — TELEPHONE ENCOUNTER
Last Office Visit: 03/06/2023  Labs: 12/28/2022  Next Follow-up appointment: 07/25/2023      Reviewed by:    Cabrera Bacon DNP, MSN, RN  RN Clinical Coordinator  [facility]

## 2023-06-07 RX ORDER — TORSEMIDE 20 MG/1
TABLET ORAL
Qty: 120 TABLET | Refills: 0 | Status: SHIPPED | OUTPATIENT
Start: 2023-06-07

## 2023-06-07 NOTE — TELEPHONE ENCOUNTER
Last Office Visit: 03/06/2023  Labs: 12/28/2022  Next Follow-up appointment: 07/25/2023      Reviewed by:    Cabrera Bacon DNP, MSN, RN  RN Clinical Coordinator  [facility] Failure [facility]

## 2023-06-12 ENCOUNTER — TELEPHONE (OUTPATIENT)
Dept: CARDIOLOGY | Facility: CLINIC | Age: 78
End: 2023-06-12

## 2023-06-12 NOTE — TELEPHONE ENCOUNTER
Caller: Unique Talavera    Relationship to patient: Self    Best call back number: [phone]    If rescheduling, when is the original appointment: 06.19.23    Additional notes: PT STATES HER SON IS NO LONGER ABLE TO BRING HER TO APPT AND SHE IS REQUESTING A RSD FOR A Monday OR Thursday AS EARLY IN THE MORNING AS POSSIBLE - NEXT AVAIL IS JAN 2024 AND PT WANTING TO BE SEEN BEFORE THAT

## 2023-06-19 ENCOUNTER — OFFICE VISIT (OUTPATIENT)
Dept: CARDIOLOGY | Facility: CLINIC | Age: 78
End: 2023-06-19
Payer: MEDICARE

## 2023-06-19 VITALS
HEIGHT: 65 IN | DIASTOLIC BLOOD PRESSURE: 70 MMHG | HEART RATE: 81 BPM | SYSTOLIC BLOOD PRESSURE: 116 MMHG | BODY MASS INDEX: 47.98 KG/M2 | WEIGHT: 288 LBS

## 2023-06-19 DIAGNOSIS — I10 ESSENTIAL HYPERTENSION: ICD-10-CM

## 2023-06-19 DIAGNOSIS — I42.8 NICM (NONISCHEMIC CARDIOMYOPATHY): Primary | ICD-10-CM

## 2023-06-19 DIAGNOSIS — I48.19 PERSISTENT ATRIAL FIBRILLATION: ICD-10-CM

## 2023-06-19 DIAGNOSIS — E66.01 MORBID OBESITY: ICD-10-CM

## 2023-06-19 RX ORDER — IBUPROFEN 200 MG
800 TABLET ORAL EVERY MORNING
COMMUNITY

## 2023-06-19 NOTE — PROGRESS NOTES
Date of Office Visit: 2023  Encounter Provider: SADAF Quintero  Place of Service: [facility] CARDIOLOGY  Patient Name: Unique Talavera  :1945    Chief Complaint   Patient presents with   • NICM   • persistent AFIB   • acute on chronic systolic CHF   • Hypertension          :     HPI: Unique Talavera is a 77 y.o. female who is followed with Dr. Martines--- permanent A-fib and NICM.  She also has hypertension, chronic pain and is morbidly obese.    She saw Dr. Martines in December, she was very edematous and short of breath she had cut back on her diuretics on her own, he referred her to the heart failure clinic.    She has been following with them and was last seen in March, per the note she is a DNR/DNI and her goals of care are really just palliative and remaining out of the hospital. She has refused repeat echo or sleep evaluation.     She presents for follow-up.    She is in a wheelchair, she does ambulate short distances with cane. She has limited mobility---a lot of chronic pain, sees a chiropractor. She use to swim, she wants to try to do that again.     No chest pain, she has chronic dyspnea w/exertion, she has some LE edema but actually looks pretty good today. She complains about having some dry skin but no open wounds.     Rivaroxaban for AC.      Past Medical History:   Diagnosis Date   • Acute on chronic congestive heart failure    • Afib    • Arthritis    • Atrial fibrillation status post cardioversion 04/15/2016   • Cardiomyopathy    • Chronic systolic congestive heart failure    • Coronary artery disease    • Daytime sleepiness    • Depression    • Dizziness    • Hypertension    • Leukocytosis    • NICM (nonischemic cardiomyopathy)    • Obesity     morbid   • Permanent atrial fibrillation    • Persistent atrial fibrillation    • Vertigo        Past Surgical History:   Procedure Laterality Date   • CARDIAC CATHETERIZATION N/A 2016    Procedure: Left  Heart Cath;  Surgeon: Jostin Arce MD;  Location:  [facility];  Service:    • CARDIAC CATHETERIZATION N/A 2016    Procedure: Right Heart Cath;  Surgeon: Jostin Arce MD;  Location:  [facility];  Service:    •  SECTION     •  SECTION     • KNEE SURGERY     • WRIST SURGERY         Social History     Socioeconomic History   • Marital status:    Tobacco Use   • Smoking status: Former     Types: Cigarettes     Quit date:      Years since quittin.4   • Smokeless tobacco: Never   Vaping Use   • Vaping Use: Never used   Substance and Sexual Activity   • Alcohol use: Not Currently     Comment: caffeine use: 3 cups daily    • Drug use: No   • Sexual activity: Defer       Family History   Problem Relation Age of Onset   • Heart disease Mother    • Stroke Father        Review of Systems   Constitutional: Positive for malaise/fatigue. Negative for chills and fever.   Cardiovascular:  Positive for leg swelling. Negative for chest pain, dyspnea on exertion, near-syncope, orthopnea, palpitations, paroxysmal nocturnal dyspnea and syncope.   Respiratory:  Negative for cough and shortness of breath.    Hematologic/Lymphatic: Negative.    Musculoskeletal:  Positive for arthritis and back pain. Negative for joint pain, joint swelling and myalgias.   Gastrointestinal:  Negative for abdominal pain, diarrhea, melena, nausea and vomiting.   Genitourinary:  Negative for frequency and hematuria.   Neurological:  Negative for light-headedness, numbness, paresthesias and seizures.   Allergic/Immunologic: Negative.    All other systems reviewed and are negative.    No Known Allergies      Current Outpatient Medications:   •  empagliflozin (Jardiance) 10 MG tablet tablet, Take 1 tablet by mouth Daily., Disp: 30 tablet, Rfl: 3  •  ibuprofen (ADVIL,MOTRIN) 200 MG tablet, Take 4 tablets by mouth Every Morning., Disp: , Rfl:   •  metoprolol succinate XL  "(TOPROL-XL) 100 MG 24 hr tablet, Take 1 tablet by mouth 2 (Two) Times a Day., Disp: 180 tablet, Rfl: 3  •  potassium chloride ER (K-TAB) 20 MEQ tablet controlled-release ER tablet, TAKE ONE TABLET BY MOUTH TWICE A DAY, Disp: 60 tablet, Rfl: 0  •  rivaroxaban (Xarelto) 20 MG tablet, Take 1 tablet by mouth Daily With Dinner., Disp: 28 tablet, Rfl: 0  •  torsemide (DEMADEX) 20 MG tablet, TAKE TWO TABLETS BY MOUTH EVERY 12 HOURS, Disp: 120 tablet, Rfl: 0      Objective:     Vitals:    06/19/23 0832   BP: 116/70   Pulse: 81   Weight: 131 kg (288 lb)   Height: 165.1 cm (65\")     Body mass index is 47.93 kg/m².    PHYSICAL EXAM:    Vitals Reviewed.   General Appearance: No acute distress, morbidly obese.   Eyes: Conjunctiva and lids: No erythema, swelling, or discharge. Sclera non-icteric.   HENT: Atraumatic, normocephalic. External eyes, ears, and nose normal.   Respiratory: No signs of respiratory distress. Respiration rhythm and depth normal.   Clear to auscultation. No rales, crackles, rhonchi, or wheezing auscultated.   Cardiovascular:  Heart Rate and Rhythm: Irregularly, irregular. Heart Sounds: S1 and S2  Murmurs: No murmurs noted. No rubs, thrills, or gallops.   Lower Extremities: Difficult to evaluate but no pitting edema today.  Gastrointestinal:  Abdomen obese.   Skin: Warm and dry.   Psychiatric: Patient alert and oriented to person, place, and time.       ECG 12 Lead    Date/Time: 6/19/2023 8:38 AM  Performed by: Nazanin Paige APRN  Authorized by: Nazanin Paige APRN   Comparison: compared with previous ECG   Similar to previous ECG  Rhythm: atrial fibrillation  BPM: 81                Assessment:       Diagnosis Plan   1. NICM (nonischemic cardiomyopathy)        2. Persistent atrial fibrillation        3. Essential hypertension        4. Morbid obesity               Plan:       1. NICM, last echo 2017, EF 37%. She is on torsemide, metoprolol succinate and empagliflozin. Appears fairly euvolemic today. Lungs " clear, no pitting edema. Has upcoming appt with [facility] in July.     2. Perm AF, HR controlled on BB, rivaroxaban for AC.     3. HTN, controlled.     4. Morbid obesity---which complicates all aspects of care. Limited mobility due to obesity and chronic pain.     Follow up with Dr. Martines in 6 months and [facility] as scheduled.     As always, it has been a pleasure to participate in your patient's care.      Sincerely,         SADAF Maravilla

## 2023-08-14 RX ORDER — TORSEMIDE 20 MG/1
TABLET ORAL
Qty: 120 TABLET | Refills: 0 | Status: SHIPPED | OUTPATIENT
Start: 2023-08-14

## 2023-08-14 RX ORDER — POTASSIUM CHLORIDE 1500 MG/1
TABLET, EXTENDED RELEASE ORAL
Qty: 60 TABLET | Refills: 0 | Status: SHIPPED | OUTPATIENT
Start: 2023-08-14

## 2023-08-14 NOTE — TELEPHONE ENCOUNTER
Last Office Visit: 03/06/2023  Labs: 12/28/2022  Next Follow-up appointment: none scheduled at this time.  Patient canceled their last appointment.       Reviewed by:    Cabrera Bacon DNP, MSN, RN  RN Clinical Coordinator  [facility]

## 2023-08-28 ENCOUNTER — HOSPITAL ENCOUNTER (OUTPATIENT)
Facility: HOSPITAL | Age: 78
Setting detail: OBSERVATION
Discharge: HOME | End: 2023-08-30
Attending: EMERGENCY MEDICINE | Admitting: INTERNAL MEDICINE
Payer: MEDICARE

## 2023-08-28 ENCOUNTER — APPOINTMENT (OUTPATIENT)
Dept: GENERAL RADIOLOGY | Facility: HOSPITAL | Age: 78
End: 2023-08-28
Payer: MEDICARE

## 2023-08-28 DIAGNOSIS — N28.9 ACUTE RENAL INSUFFICIENCY: ICD-10-CM

## 2023-08-28 DIAGNOSIS — I50.23 ACUTE ON CHRONIC SYSTOLIC CHF (CONGESTIVE HEART FAILURE): ICD-10-CM

## 2023-08-28 DIAGNOSIS — I50.23 ACUTE ON CHRONIC SYSTOLIC CONGESTIVE HEART FAILURE: Primary | ICD-10-CM

## 2023-08-28 PROBLEM — N17.9 AKI (ACUTE KIDNEY INJURY): Status: ACTIVE | Noted: 2023-08-28

## 2023-08-28 LAB
ALBUMIN SERPL-MCNC: 4 G/DL (ref 3.5–5.2)
ALBUMIN/GLOB SERPL: 1 G/DL
ALP SERPL-CCNC: 133 U/L (ref 39–117)
ALT SERPL W P-5'-P-CCNC: 11 U/L (ref 1–33)
ANION GAP SERPL CALCULATED.3IONS-SCNC: 12.5 MMOL/L (ref 5–15)
AST SERPL-CCNC: 20 U/L (ref 1–32)
BASOPHILS # BLD AUTO: 0.06 10*3/MM3 (ref 0–0.2)
BASOPHILS NFR BLD AUTO: 0.7 % (ref 0–1.5)
BILIRUB SERPL-MCNC: 0.3 MG/DL (ref 0–1.2)
BUN SERPL-MCNC: 32 MG/DL (ref 8–23)
BUN/CREAT SERPL: 20.1 (ref 7–25)
CALCIUM SPEC-SCNC: 8.8 MG/DL (ref 8.6–10.5)
CHLORIDE SERPL-SCNC: 104 MMOL/L (ref 98–107)
CO2 SERPL-SCNC: 24.5 MMOL/L (ref 22–29)
CREAT SERPL-MCNC: 1.59 MG/DL (ref 0.57–1)
DEPRECATED RDW RBC AUTO: 43.3 FL (ref 37–54)
EGFRCR SERPLBLD CKD-EPI 2021: 32.9 ML/MIN/1.73
EOSINOPHIL # BLD AUTO: 0.3 10*3/MM3 (ref 0–0.4)
EOSINOPHIL NFR BLD AUTO: 3.3 % (ref 0.3–6.2)
ERYTHROCYTE [DISTWIDTH] IN BLOOD BY AUTOMATED COUNT: 13 % (ref 12.3–15.4)
GEN 5 2HR TROPONIN T REFLEX: 13 NG/L
GLOBULIN UR ELPH-MCNC: 3.9 GM/DL
GLUCOSE SERPL-MCNC: 100 MG/DL (ref 65–99)
HCT VFR BLD AUTO: 43.2 % (ref 34–46.6)
HGB BLD-MCNC: 14.1 G/DL (ref 12–15.9)
IMM GRANULOCYTES # BLD AUTO: 0.02 10*3/MM3 (ref 0–0.05)
IMM GRANULOCYTES NFR BLD AUTO: 0.2 % (ref 0–0.5)
LYMPHOCYTES # BLD AUTO: 1.72 10*3/MM3 (ref 0.7–3.1)
LYMPHOCYTES NFR BLD AUTO: 18.7 % (ref 19.6–45.3)
MCH RBC QN AUTO: 29.8 PG (ref 26.6–33)
MCHC RBC AUTO-ENTMCNC: 32.6 G/DL (ref 31.5–35.7)
MCV RBC AUTO: 91.3 FL (ref 79–97)
MONOCYTES # BLD AUTO: 0.8 10*3/MM3 (ref 0.1–0.9)
MONOCYTES NFR BLD AUTO: 8.7 % (ref 5–12)
NEUTROPHILS NFR BLD AUTO: 6.31 10*3/MM3 (ref 1.7–7)
NEUTROPHILS NFR BLD AUTO: 68.4 % (ref 42.7–76)
NRBC BLD AUTO-RTO: 0 /100 WBC (ref 0–0.2)
NT-PROBNP SERPL-MCNC: 2924 PG/ML (ref 0–1800)
PLATELET # BLD AUTO: 227 10*3/MM3 (ref 140–450)
PMV BLD AUTO: 10.2 FL (ref 6–12)
POTASSIUM SERPL-SCNC: 4.7 MMOL/L (ref 3.5–5.2)
PROT SERPL-MCNC: 7.9 G/DL (ref 6–8.5)
QT INTERVAL: 422 MS
QTC INTERVAL: 456 MS
RBC # BLD AUTO: 4.73 10*6/MM3 (ref 3.77–5.28)
SODIUM SERPL-SCNC: 141 MMOL/L (ref 136–145)
TROPONIN T DELTA: 1 NG/L
TROPONIN T SERPL HS-MCNC: 12 NG/L
TROPONIN T SERPL HS-MCNC: 18 NG/L
WBC NRBC COR # BLD: 9.21 10*3/MM3 (ref 3.4–10.8)

## 2023-08-28 PROCEDURE — 71045 X-RAY EXAM CHEST 1 VIEW: CPT

## 2023-08-28 PROCEDURE — 84484 ASSAY OF TROPONIN QUANT: CPT | Performed by: NURSE PRACTITIONER

## 2023-08-28 PROCEDURE — 84484 ASSAY OF TROPONIN QUANT: CPT | Performed by: EMERGENCY MEDICINE

## 2023-08-28 PROCEDURE — G0378 HOSPITAL OBSERVATION PER HR: HCPCS

## 2023-08-28 PROCEDURE — 99284 EMERGENCY DEPT VISIT MOD MDM: CPT

## 2023-08-28 PROCEDURE — 96374 THER/PROPH/DIAG INJ IV PUSH: CPT

## 2023-08-28 PROCEDURE — 85025 COMPLETE CBC W/AUTO DIFF WBC: CPT | Performed by: EMERGENCY MEDICINE

## 2023-08-28 PROCEDURE — 80053 COMPREHEN METABOLIC PANEL: CPT | Performed by: EMERGENCY MEDICINE

## 2023-08-28 PROCEDURE — 83880 ASSAY OF NATRIURETIC PEPTIDE: CPT | Performed by: EMERGENCY MEDICINE

## 2023-08-28 PROCEDURE — 25010000002 FUROSEMIDE PER 20 MG: Performed by: EMERGENCY MEDICINE

## 2023-08-28 PROCEDURE — 93005 ELECTROCARDIOGRAM TRACING: CPT | Performed by: EMERGENCY MEDICINE

## 2023-08-28 PROCEDURE — 25010000002 FUROSEMIDE PER 20 MG: Performed by: NURSE PRACTITIONER

## 2023-08-28 PROCEDURE — 36415 COLL VENOUS BLD VENIPUNCTURE: CPT | Performed by: NURSE PRACTITIONER

## 2023-08-28 PROCEDURE — 36415 COLL VENOUS BLD VENIPUNCTURE: CPT

## 2023-08-28 PROCEDURE — 93010 ELECTROCARDIOGRAM REPORT: CPT | Performed by: INTERNAL MEDICINE

## 2023-08-28 PROCEDURE — 96376 TX/PRO/DX INJ SAME DRUG ADON: CPT

## 2023-08-28 RX ORDER — TORSEMIDE 20 MG/1
40 TABLET ORAL
Status: DISCONTINUED | OUTPATIENT
Start: 2023-08-28 | End: 2023-08-28

## 2023-08-28 RX ORDER — FUROSEMIDE 10 MG/ML
40 INJECTION INTRAMUSCULAR; INTRAVENOUS EVERY 12 HOURS
Status: DISCONTINUED | OUTPATIENT
Start: 2023-08-28 | End: 2023-08-29

## 2023-08-28 RX ORDER — FUROSEMIDE 10 MG/ML
80 INJECTION INTRAMUSCULAR; INTRAVENOUS ONCE
Status: COMPLETED | OUTPATIENT
Start: 2023-08-28 | End: 2023-08-28

## 2023-08-28 RX ORDER — METOPROLOL SUCCINATE 100 MG/1
100 TABLET, EXTENDED RELEASE ORAL 2 TIMES DAILY
Status: DISCONTINUED | OUTPATIENT
Start: 2023-08-28 | End: 2023-08-30 | Stop reason: HOSPADM

## 2023-08-28 RX ORDER — POTASSIUM CHLORIDE 750 MG/1
20 TABLET, FILM COATED, EXTENDED RELEASE ORAL 2 TIMES DAILY
Status: DISCONTINUED | OUTPATIENT
Start: 2023-08-28 | End: 2023-08-30 | Stop reason: HOSPADM

## 2023-08-28 RX ORDER — SODIUM CHLORIDE 0.9 % (FLUSH) 0.9 %
10 SYRINGE (ML) INJECTION AS NEEDED
Status: DISCONTINUED | OUTPATIENT
Start: 2023-08-28 | End: 2023-08-30 | Stop reason: HOSPADM

## 2023-08-28 RX ORDER — ACETAMINOPHEN 325 MG/1
650 TABLET ORAL EVERY 6 HOURS PRN
Status: DISCONTINUED | OUTPATIENT
Start: 2023-08-28 | End: 2023-08-30 | Stop reason: HOSPADM

## 2023-08-28 RX ADMIN — Medication 10 ML: at 20:32

## 2023-08-28 RX ADMIN — EMPAGLIFLOZIN 10 MG: 10 TABLET, FILM COATED ORAL at 15:30

## 2023-08-28 RX ADMIN — FUROSEMIDE 80 MG: 20 INJECTION, SOLUTION INTRAMUSCULAR; INTRAVENOUS at 11:10

## 2023-08-28 RX ADMIN — METOPROLOL SUCCINATE 100 MG: 100 TABLET, EXTENDED RELEASE ORAL at 20:30

## 2023-08-28 RX ADMIN — RIVAROXABAN 15 MG: 15 TABLET, FILM COATED ORAL at 17:32

## 2023-08-28 RX ADMIN — POTASSIUM CHLORIDE 20 MEQ: 750 TABLET, EXTENDED RELEASE ORAL at 20:30

## 2023-08-28 RX ADMIN — FUROSEMIDE 40 MG: 10 INJECTION, SOLUTION INTRAMUSCULAR; INTRAVENOUS at 20:30

## 2023-08-28 NOTE — ED NOTES
Nursing report ED to floor  Unique Talavera  78 y.o.  female    HPI :   Chief Complaint   Patient presents with    Shortness of Breath       Admitting doctor:   Aidan Snowden MD    Admitting diagnosis:   The primary encounter diagnosis was Acute on chronic systolic congestive heart failure. A diagnosis of Acute renal insufficiency was also pertinent to this visit.    Code status:   Current Code Status       Date Active Code Status Order ID Comments User Context       Prior            Allergies:   Patient has no known allergies.    Isolation:   No active isolations    Intake and Output  No intake or output data in the 24 hours ending 08/28/23 1115    Weight:   There were no vitals filed for this visit.    Most recent vitals:   Vitals:    08/28/23 0831 08/28/23 0901   BP:  116/75   Pulse: 66 77   Resp: 22    Temp: 97.7 °F (36.5 °C)    TempSrc: Tympanic    SpO2: 95% 90%       Active LDAs/IV Access:   Lines, Drains & Airways       Active LDAs       Name Placement date Placement time Site Days    Peripheral IV 08/28/23 0902 Left;Posterior Hand 08/28/23  0902  Hand  less than 1                    Labs (abnormal labs have a star):   Labs Reviewed   COMPREHENSIVE METABOLIC PANEL - Abnormal; Notable for the following components:       Result Value    Glucose 100 (*)     BUN 32 (*)     Creatinine 1.59 (*)     Alkaline Phosphatase 133 (*)     eGFR 32.9 (*)     All other components within normal limits    Narrative:     GFR Normal >60  Chronic Kidney Disease <60  Kidney Failure <15    The GFR formula is only valid for adults with stable renal function between ages 18 and 70.   SINGLE HSTROPONIN T - Abnormal; Notable for the following components:    HS Troponin T 18 (*)     All other components within normal limits    Narrative:     High Sensitive Troponin T Reference Range:  <10.0 ng/L- Negative Female for AMI  <15.0 ng/L- Negative Male for AMI  >=10 - Abnormal Female indicating possible myocardial injury.  >=15 - Abnormal  Male indicating possible myocardial injury.   Clinicians would have to utilize clinical acumen, EKG, Troponin, and serial changes to determine if it is an Acute Myocardial Infarction or myocardial injury due to an underlying chronic condition.        BNP (IN-HOUSE) - Abnormal; Notable for the following components:    proBNP 2,924.0 (*)     All other components within normal limits    Narrative:     Among patients with dyspnea, NT-proBNP is highly sensitive for the detection of acute congestive heart failure. In addition NT-proBNP of <300 pg/ml effectively rules out acute congestive heart failure with 99% negative predictive value.     CBC WITH AUTO DIFFERENTIAL - Abnormal; Notable for the following components:    Lymphocyte % 18.7 (*)     All other components within normal limits   CBC AND DIFFERENTIAL    Narrative:     The following orders were created for panel order CBC & Differential.  Procedure                               Abnormality         Status                     ---------                               -----------         ------                     CBC Auto Differential[[id number]]        Abnormal            Final result                 Please view results for these tests on the individual orders.       EKG:   ECG 12 Lead Dyspnea   Preliminary Result   HEART RATE= 70  bpm   RR Interval= 857  ms   VT Interval=   ms   P Horizontal Axis=   deg   P Front Axis=   deg   QRSD Interval= 111  ms   QT Interval= 422  ms   QTcB= 456  ms   QRS Axis= 41  deg   T Wave Axis= 60  deg   - ABNORMAL ECG -   Atrial fibrillation   Low voltage, extremity and precordial leads   Consider anterior infarct   Electronically Signed By:    Date and Time of Study: 2023-08-28 10:02:02          Meds given in ED:   Medications   sodium chloride 0.9 % flush 10 mL (has no administration in time range)   furosemide (LASIX) injection 80 mg (80 mg Intravenous Given 8/28/23 1110)       Imaging results:  XR Chest 1 View    Result Date:  2023  As described.  This report was finalized on 2023 9:28 AM by Dr. Yoni Rizo M.D.       Ambulatory status:   - assist x2     Social issues:   Social History     Socioeconomic History    Marital status:    Tobacco Use    Smoking status: Former     Types: Cigarettes     Quit date:      Years since quittin.6    Smokeless tobacco: Never   Vaping Use    Vaping Use: Never used   Substance and Sexual Activity    Alcohol use: Not Currently     Comment: caffeine use: 3 cups daily     Drug use: No    Sexual activity: Defer       NIH Stroke Scale:       Rachel Montgomery RN  23 11:15 EDT

## 2023-08-28 NOTE — ED PROVIDER NOTES
EMERGENCY DEPARTMENT ENCOUNTER    Room Number:  E457/1  PCP: Elvis Diaz MD  Historian: Patient, son      HPI:  Chief Complaint: Shortness of breath, leg swelling  A complete HPI/ROS/PMH/PSH/SH/FH are unobtainable due to: Nothing  Context: Unique Talavera is a 78 y.o. female, with a history of CHF, atrial fibrillation, and morbid obesity, who presents to the ED c/o increased shortness of breath and increased leg swelling.  Symptoms have been gradually getting worse for the past several weeks.  Shortness of breath is worse with exertion and with lying down.  She takes torsemide 40 mg twice daily.  She does not weigh regularly so is unsure of any recent weight gain.  Also reports intermittent chest discomfort.  Denies cough, fever, dizziness, fainting, abdominal pain, or vomiting.  She takes Xarelto.  She is not on home O2.            PAST MEDICAL HISTORY  Active Ambulatory Problems     Diagnosis Date Noted    Persistent atrial fibrillation 04/11/2016    Arthritis 04/22/2016    Morbid obesity 04/22/2016    NICM (nonischemic cardiomyopathy) 07/20/2016    Vertigo 07/21/2016    Daytime sleepiness 05/15/2019    DNR (do not resuscitate) 10/27/2019    Essential hypertension 02/03/2021    Pulmonary hypertension 01/09/2023    Sleep apnea-like behavior 01/09/2023     Resolved Ambulatory Problems     Diagnosis Date Noted    Vertigo 07/20/2016    Dizziness 07/20/2016    CVA (cerebral infarction) 07/20/2016    Noncompliance 07/20/2016    Leukocytosis 07/20/2016    Acute on chronic systolic congestive heart failure (HCC) 10/25/2019    Acute on chronic congestive heart failure 02/14/2022     Past Medical History:   Diagnosis Date    Afib     Atrial fibrillation status post cardioversion 04/15/2016    Cardiomyopathy     Chronic systolic congestive heart failure     Coronary artery disease     Depression     Hypertension     Obesity     Permanent atrial fibrillation          PAST SURGICAL HISTORY  Past Surgical History:    Procedure Laterality Date    CARDIAC CATHETERIZATION N/A 2016    Procedure: Left Heart Cath;  Surgeon: Jostin Arce MD;  Location:  [facility];  Service:     CARDIAC CATHETERIZATION N/A 2016    Procedure: Right Heart Cath;  Surgeon: Jostin Arce MD;  Location:  [facility];  Service:      SECTION       SECTION      KNEE SURGERY      WRIST SURGERY           FAMILY HISTORY  Family History   Problem Relation Age of Onset    Heart disease Mother     Stroke Father          SOCIAL HISTORY  Social History     Socioeconomic History    Marital status:    Tobacco Use    Smoking status: Former     Types: Cigarettes     Quit date:      Years since quittin.6    Smokeless tobacco: Never   Vaping Use    Vaping Use: Never used   Substance and Sexual Activity    Alcohol use: Not Currently     Comment: caffeine use: 3 cups daily     Drug use: No    Sexual activity: Defer         ALLERGIES  Patient has no known allergies.    REVIEW OF SYSTEMS  All systems have been reviewed and are negative except for those listed in the HPI      PHYSICAL EXAM  ED Triage Vitals [23 0831]   Temp Heart Rate Resp BP SpO2   97.7 °F (36.5 °C) 66 22 -- 95 %      Temp src Heart Rate Source Patient Position BP Location FiO2 (%)   Tympanic Monitor -- -- --       Physical Exam      GENERAL: Awake, alert, oriented x3.  Well-developed elderly female.  Resting comfortably in no acute distress.  BMI 47.9  HENT: NCAT, nares patent  EYES: no scleral icterus  CV: regular rhythm, normal rate  RESPIRATORY: normal effort, faint rales in both bases, decreased air movement bilaterally  ABDOMEN: soft, obese, nontender  MUSCULOSKELETAL: Extremities are nontender with full range of motion.  There is 2+ edema in both lower leg  NEURO: Speech is normal.  No facial droop.  Follows commands  PSYCH:  calm, cooperative  SKIN: warm, dry    Vital signs and nursing notes  reviewed.          LAB RESULTS  Recent Results (from the past 24 hour(s))   Comprehensive Metabolic Panel    Collection Time: 08/28/23  9:01 AM    Specimen: Blood   Result Value Ref Range    Glucose 100 (H) 65 - 99 mg/dL    BUN 32 (H) 8 - 23 mg/dL    Creatinine 1.59 (H) 0.57 - 1.00 mg/dL    Sodium 141 136 - 145 mmol/L    Potassium 4.7 3.5 - 5.2 mmol/L    Chloride 104 98 - 107 mmol/L    CO2 24.5 22.0 - 29.0 mmol/L    Calcium 8.8 8.6 - 10.5 mg/dL    Total Protein 7.9 6.0 - 8.5 g/dL    Albumin 4.0 3.5 - 5.2 g/dL    ALT (SGPT) 11 1 - 33 U/L    AST (SGOT) 20 1 - 32 U/L    Alkaline Phosphatase 133 (H) 39 - 117 U/L    Total Bilirubin 0.3 0.0 - 1.2 mg/dL    Globulin 3.9 gm/dL    A/G Ratio 1.0 g/dL    BUN/Creatinine Ratio 20.1 7.0 - 25.0    Anion Gap 12.5 5.0 - 15.0 mmol/L    eGFR 32.9 (L) >60.0 mL/min/1.73   Single High Sensitivity Troponin T    Collection Time: 08/28/23  9:01 AM    Specimen: Blood   Result Value Ref Range    HS Troponin T 18 (H) <10 ng/L   BNP    Collection Time: 08/28/23  9:01 AM    Specimen: Blood   Result Value Ref Range    proBNP 2,924.0 (H) 0.0 - 1,800.0 pg/mL   CBC Auto Differential    Collection Time: 08/28/23  9:01 AM    Specimen: Blood   Result Value Ref Range    WBC 9.21 3.40 - 10.80 10*3/mm3    RBC 4.73 3.77 - 5.28 10*6/mm3    Hemoglobin 14.1 12.0 - 15.9 g/dL    Hematocrit 43.2 34.0 - 46.6 %    MCV 91.3 79.0 - 97.0 fL    MCH 29.8 26.6 - 33.0 pg    MCHC 32.6 31.5 - 35.7 g/dL    RDW 13.0 12.3 - 15.4 %    RDW-SD 43.3 37.0 - 54.0 fl    MPV 10.2 6.0 - 12.0 fL    Platelets 227 140 - 450 10*3/mm3    Neutrophil % 68.4 42.7 - 76.0 %    Lymphocyte % 18.7 (L) 19.6 - 45.3 %    Monocyte % 8.7 5.0 - 12.0 %    Eosinophil % 3.3 0.3 - 6.2 %    Basophil % 0.7 0.0 - 1.5 %    Immature Grans % 0.2 0.0 - 0.5 %    Neutrophils, Absolute 6.31 1.70 - 7.00 10*3/mm3    Lymphocytes, Absolute 1.72 0.70 - 3.10 10*3/mm3    Monocytes, Absolute 0.80 0.10 - 0.90 10*3/mm3    Eosinophils, Absolute 0.30 0.00 - 0.40 10*3/mm3     Basophils, Absolute 0.06 0.00 - 0.20 10*3/mm3    Immature Grans, Absolute 0.02 0.00 - 0.05 10*3/mm3    nRBC 0.0 0.0 - 0.2 /100 WBC   ECG 12 Lead Dyspnea    Collection Time: 08/28/23 10:02 AM   Result Value Ref Range    QT Interval 422 ms    QTC Interval 456 ms       Ordered the above labs and reviewed the results.        RADIOLOGY  XR Chest 1 View    Result Date: 8/28/2023  XR CHEST 1 VW-  HISTORY: Female who is 78 years-old, short of breath  TECHNIQUE: Frontal view of the chest  COMPARISON: 2/14/2022  FINDINGS: The heart is enlarged with mild pulmonary vascular congestion. Minimal likely atelectasis in the left lower lung. No pleural effusion, or pneumothorax. No acute osseous process.      As described.  This report was finalized on 8/28/2023 9:28 AM by Dr. Yoni Rizo M.D.       Ordered the above noted radiological studies. Reviewed by me in PACS.            PROCEDURES  Procedures              MEDICATIONS GIVEN IN ER  Medications   sodium chloride 0.9 % flush 10 mL (has no administration in time range)   empagliflozin (JARDIANCE) tablet 10 mg (has no administration in time range)   metoprolol succinate XL (TOPROL-XL) 24 hr tablet 100 mg (has no administration in time range)   potassium chloride (K-DUR,KLOR-CON) ER tablet 20 mEq (has no administration in time range)   rivaroxaban (XARELTO) tablet 20 mg (has no administration in time range)   torsemide (DEMADEX) tablet 40 mg (has no administration in time range)   acetaminophen (TYLENOL) tablet 650 mg (has no administration in time range)   furosemide (LASIX) injection 80 mg (80 mg Intravenous Given 8/28/23 1110)                   MEDICAL DECISION MAKING, PROGRESS, and CONSULTS    All labs have been independently reviewed by me.  All radiology studies have been reviewed by me and I have also reviewed the radiology report.   EKG's independently viewed and interpreted by me.  Discussion below represents my analysis of pertinent findings related to patient's  condition, differential diagnosis, treatment plan and final disposition.      Additional sources:  - Discussed/ obtained information from independent historians: Son at bedside    - External (non-ED) record review: Patient was last admitted here in February 2022 for acute on chronic CHF.  She was seen by cardiology.  She was discharged on 2 L oxygen.  Echocardiogram done in August 2017 showed an EF of 37%.  There was global LV hypokinesis.  LV cavity was moderately dilated.  RV cavity was mildly dilated.  She last saw her cardiologist in June 2023 for follow-up for nonischemic cardiomyopathy, A-fib, CHF, and hypertension.    - Chronic or social conditions impacting care: N/A          Orders placed during this visit:  Orders Placed This Encounter   Procedures    XR Chest 1 View    Comprehensive Metabolic Panel    Single High Sensitivity Troponin T    BNP    CBC Auto Differential    High Sensitivity Troponin T    CBC (No Diff)    Basic Metabolic Panel    Uric Acid    Diet: Cardiac Diets; Healthy Heart (2-3 Na+); Texture: Regular Texture (IDDSI 7); Fluid Consistency: Thin (IDDSI 0)    Monitor Blood Pressure    Pulse Oximetry, Continuous    [facility] (on-call MD unless specified) Details    Inpatient Cardiology Consult    Inpatient Case Management  Consult    PT Consult: Eval & Treat Functional Mobility Below Baseline    ECG 12 Lead Dyspnea    SCANNED - TELEMETRY      Insert Peripheral IV    Initiate Observation Status    CBC & Differential         Additional orders considered but not ordered:  N/A        Differential diagnosis:    Differential diagnosis includes but is not limited to CHF, acute coronary syndrome, pulmonary embolism, pneumothorax, pneumonia, asthma/COPD, deconditioning, anemia, anxiety.         Independent interpretation of labs, radiology studies, and discussions with consultants:  ED Course as of 08/28/23 1432   Mon Aug 28, 2023   0930 Per ELENITA Li, patient's O2 sats are intermittently  dropping into the upper 80s but then quickly returned to the low 90s. [WH]   0950 proBNP(!): 2,924.0  Approximately 1700 eight months ago [WH]   0951 Creatinine(!): 1.59  1.02 eight months ago [WH]   0951 WBC: 9.21 [WH]   0951 Hemoglobin: 14.1 [WH]   0951 HS Troponin T(!): 18 [WH]   0951 Chest x-ray personally interpreted by me.  My personal trepidation is: There is cardiomegaly.  There is increased vascular congestion.  No pleural effusion [WH]   0952 Patient's work-up is consistent with CHF.  She will be given Lasix 80 mg IV.  She also has acute renal insufficiency.  Call will be placed to the hospitalist for admission. [WH]   1004 Case discussed with Dr. Snowden, hospitalist, and he agrees to admit the patient to a monitored bed.  Pertinent history, exam findings, test results, ED course, and diagnoses were discussed with him. [WH]   1034 EKG personally interpreted by me at 10:10 AM.  My personal interpretation is:          EKG time: 10:02 AM  Rhythm/Rate: Atrial fibrillation, rate 70  P waves and [place]: Irregular, varying  QRS, axis: Normal axis, anterior Q waves  ST and T waves: Normal    Interpreted Contemporaneously by me, independently viewed  EKG is not significantly changed compared to prior EKG done on 2/14/2022   [WH]   1052 Patient presented to the ED complaining of worsening shortness of breath and leg swelling.  She was found to be in CHF.  She was given IV Lasix.  She had a few brief episodes of hypoxia but for the most part, her O2 sat stayed in the mid 90s on room air.  Troponin was minimally elevated but she denied chest pain.  EKG did not have any new acute ischemic changes.  She will be admitted to the hospitalist. [WH]      ED Course User Index  [] Delano Salas MD               DIAGNOSIS  Final diagnoses:   Acute on chronic systolic congestive heart failure   Acute renal insufficiency         DISPOSITION  ADMISSION    Discussed treatment plan and reason for admission with pt/family and  admitting physician.  Pt/family voiced understanding of the plan for admission for further testing/treatment as needed.               Latest Documented Vital Signs:  As of 14:32 EDT  BP- 129/63 HR- 72 Temp- 97.5 °F (36.4 °C) (Oral) O2 sat- 94%              --    Please note that portions of this were completed with a voice recognition program.       Note Disclaimer: At [facility], we believe that sharing information builds trust and better relationships. You are receiving this note because you are receiving care at [facility] or recently visited. It is possible you will see health information before a provider has talked with you about it. This kind of information can be easy to misunderstand. To help you fully understand what it means for your health, we urge you to discuss this note with your provider.             Delano Salas MD  08/28/23 5705

## 2023-08-28 NOTE — PLAN OF CARE
Problem: Adult Inpatient Plan of Care  Goal: Plan of Care Review  Outcome: Ongoing, Progressing  Flowsheets (Taken 8/28/2023 1830)  Progress: improving  Plan of Care Reviewed With: patient  Outcome Evaluation: Pt admitted from er this afternoon. Cardiology consulted. IV lasix given in er. With good output. No c/o pain. Pt safety maintained

## 2023-08-28 NOTE — H&P
Patient Name:  Unique Talavera  YOB: 1945  MRN:  [id number]  Admit Date:  2023  Patient Care Team:  Elvis Diaz MD as PCP - General (Family Medicine)  Michael Sorensen MD PhD as Consulting Physician (Cardiology)      Subjective   History Present Illness     Chief Complaint   Patient presents with    Shortness of Breath           History of Present Illness  Ms. Talavera is a 78 y.o.  CHF, atrial fibrillation, cardiomyopathy, CAD, HTN, obesity that presents with shortness of breath and lower extremity swelling.  Her symptoms have began progressive the last few weeks.  She reports PND and orthopnea.  She is on Lasix twice daily and reports compliance.  She does not weigh herself daily so is unaware of weight gain.  Per cardiology notes patient is a DNR/DNI with conservative palliative goals but still continuing medical care.  Her last echocardiogram 2017 with EF 37%.    Review of Systems     Personal History     Past Medical History:   Diagnosis Date    Acute on chronic congestive heart failure     Afib     Arthritis     Atrial fibrillation status post cardioversion 04/15/2016    Cardiomyopathy     Chronic systolic congestive heart failure     Coronary artery disease     Daytime sleepiness     Depression     Dizziness     Hypertension     Leukocytosis     NICM (nonischemic cardiomyopathy)     Obesity     morbid    Permanent atrial fibrillation     Persistent atrial fibrillation     Vertigo      Past Surgical History:   Procedure Laterality Date    CARDIAC CATHETERIZATION N/A 2016    Procedure: Left Heart Cath;  Surgeon: Jostin Arce MD;  Location:  [facility];  Service:     CARDIAC CATHETERIZATION N/A 2016    Procedure: Right Heart Cath;  Surgeon: Jostin Arce MD;  Location:  [facility];  Service:      SECTION       SECTION      KNEE SURGERY      WRIST SURGERY       Family History   Problem Relation Age of  Onset    Heart disease Mother     Stroke Father      Social History     Tobacco Use    Smoking status: Former     Types: Cigarettes     Quit date:      Years since quittin.6    Smokeless tobacco: Never   Vaping Use    Vaping Use: Never used   Substance Use Topics    Alcohol use: Not Currently     Comment: caffeine use: 3 cups daily     Drug use: No     No current facility-administered medications on file prior to encounter.     Current Outpatient Medications on File Prior to Encounter   Medication Sig Dispense Refill    empagliflozin (Jardiance) 10 MG tablet tablet Take 1 tablet by mouth Daily. 30 tablet 3    ibuprofen (ADVIL,MOTRIN) 200 MG tablet Take 4 tablets by mouth Every Morning.      metoprolol succinate XL (TOPROL-XL) 100 MG 24 hr tablet Take 1 tablet by mouth 2 (Two) Times a Day. 180 tablet 3    Multiple Vitamins-Minerals (PRESERVISION AREDS 2 PO) Take 1 dose by mouth 2 (Two) Times a Day.      potassium chloride ER (K-TAB) 20 MEQ tablet controlled-release ER tablet TAKE 1 TABLET BY MOUTH TWICE A DAY (Patient taking differently: Take 1 tablet by mouth 2 (Two) Times a Day.) 60 tablet 0    rivaroxaban (Xarelto) 20 MG tablet Take 1 tablet by mouth Daily With Dinner. 28 tablet 0    torsemide (DEMADEX) 20 MG tablet TAKE TWO TABLETS BY MOUTH EVERY 12 HOURS (Patient taking differently: Take 2 tablets by mouth 2 (Two) Times a Day.) 120 tablet 0     No Known Allergies    Objective    Objective     Vital Signs  Temp:  [97.7 °F (36.5 °C)] 97.7 °F (36.5 °C)  Heart Rate:  [66-77] 77  Resp:  [22] 22  BP: (116)/(75) 116/75  SpO2:  [90 %-95 %] 90 %  on   ;   Device (Oxygen Therapy): room air  There is no height or weight on file to calculate BMI.    Physical Exam  Vitals and nursing note reviewed.   Constitutional:       Appearance: She is well-developed. She is obese. She is ill-appearing.   HENT:      Head: Normocephalic and atraumatic.   Eyes:      Pupils: Pupils are equal, round, and reactive to light.    Cardiovascular:      Rate and Rhythm: Rhythm irregular.      Heart sounds: Normal heart sounds.   Pulmonary:      Effort: Pulmonary effort is normal. No respiratory distress.      Breath sounds: Normal breath sounds. No wheezing.   Abdominal:      General: Bowel sounds are normal. There is no distension or abdominal bruit.      Palpations: Abdomen is soft. Abdomen is not rigid. There is no shifting dullness, fluid wave, mass or pulsatile mass.      Tenderness: There is no abdominal tenderness. There is no guarding.      Hernia: No hernia is present.   Musculoskeletal:         General: Normal range of motion.      Right lower leg: Edema present.      Left lower leg: Edema present.   Skin:     General: Skin is warm and dry.   Neurological:      Mental Status: She is alert and oriented to person, place, and time.   Psychiatric:         Behavior: Behavior normal.         Thought Content: Thought content normal.           Results Review:  I reviewed the patient's new clinical results.  I reviewed the patient's new imaging results and agree with the interpretation.  I reviewed the patient's other test results and agree with the interpretation  I personally viewed and interpreted the patient's EKG/Telemetry data  Discussed with ED provider.    Lab Results (last 24 hours)       Procedure Component Value Units Date/Time    CBC & Differential [[id number]]  (Abnormal) Collected: 08/28/23 0901    Specimen: Blood Updated: 08/28/23 0911    Narrative:      The following orders were created for panel order CBC & Differential.  Procedure                               Abnormality         Status                     ---------                               -----------         ------                     CBC Auto Differential[[id number]]        Abnormal            Final result                 Please view results for these tests on the individual orders.    Comprehensive Metabolic Panel [[id number]]  (Abnormal) Collected: 08/28/23 0901     Specimen: Blood Updated: 08/28/23 0929     Glucose 100 mg/dL      BUN 32 mg/dL      Creatinine 1.59 mg/dL      Sodium 141 mmol/L      Potassium 4.7 mmol/L      Comment: Slight hemolysis detected by analyzer. Results may be affected.        Chloride 104 mmol/L      CO2 24.5 mmol/L      Calcium 8.8 mg/dL      Total Protein 7.9 g/dL      Albumin 4.0 g/dL      ALT (SGPT) 11 U/L      AST (SGOT) 20 U/L      Comment: Slight hemolysis detected by analyzer. Results may be affected.        Alkaline Phosphatase 133 U/L      Total Bilirubin 0.3 mg/dL      Globulin 3.9 gm/dL      A/G Ratio 1.0 g/dL      BUN/Creatinine Ratio 20.1     Anion Gap 12.5 mmol/L      eGFR 32.9 mL/min/1.73     Narrative:      GFR Normal >60  Chronic Kidney Disease <60  Kidney Failure <15    The GFR formula is only valid for adults with stable renal function between ages 18 and 70.    Single High Sensitivity Troponin T [[id number]]  (Abnormal) Collected: 08/28/23 0901    Specimen: Blood Updated: 08/28/23 0929     HS Troponin T 18 ng/L     Narrative:      High Sensitive Troponin T Reference Range:  <10.0 ng/L- Negative Female for AMI  <15.0 ng/L- Negative Male for AMI  >=10 - Abnormal Female indicating possible myocardial injury.  >=15 - Abnormal Male indicating possible myocardial injury.   Clinicians would have to utilize clinical acumen, EKG, Troponin, and serial changes to determine if it is an Acute Myocardial Infarction or myocardial injury due to an underlying chronic condition.         BNP [[id number]]  (Abnormal) Collected: 08/28/23 0901    Specimen: Blood Updated: 08/28/23 0927     proBNP 2,924.0 pg/mL     Narrative:      Among patients with dyspnea, NT-proBNP is highly sensitive for the detection of acute congestive heart failure. In addition NT-proBNP of <300 pg/ml effectively rules out acute congestive heart failure with 99% negative predictive value.      CBC Auto Differential [[id number]]  (Abnormal) Collected: 08/28/23 0901    Specimen: Blood  Updated: 08/28/23 0911     WBC 9.21 10*3/mm3      RBC 4.73 10*6/mm3      Hemoglobin 14.1 g/dL      Hematocrit 43.2 %      MCV 91.3 fL      MCH 29.8 pg      MCHC 32.6 g/dL      RDW 13.0 %      RDW-SD 43.3 fl      MPV 10.2 fL      Platelets 227 10*3/mm3      Neutrophil % 68.4 %      Lymphocyte % 18.7 %      Monocyte % 8.7 %      Eosinophil % 3.3 %      Basophil % 0.7 %      Immature Grans % 0.2 %      Neutrophils, Absolute 6.31 10*3/mm3      Lymphocytes, Absolute 1.72 10*3/mm3      Monocytes, Absolute 0.80 10*3/mm3      Eosinophils, Absolute 0.30 10*3/mm3      Basophils, Absolute 0.06 10*3/mm3      Immature Grans, Absolute 0.02 10*3/mm3      nRBC 0.0 /100 WBC             Imaging Results (Last 24 Hours)       Procedure Component Value Units Date/Time    XR Chest 1 View [[id number]] Collected: 08/28/23 0925     Updated: 08/28/23 0931    Narrative:      XR CHEST 1 VW-     HISTORY: Female who is 78 years-old, short of breath     TECHNIQUE: Frontal view of the chest     COMPARISON: 2/14/2022     FINDINGS: The heart is enlarged with mild pulmonary vascular congestion.  Minimal likely atelectasis in the left lower lung. No pleural effusion,  or pneumothorax. No acute osseous process.       Impression:      As described.     This report was finalized on 8/28/2023 9:28 AM by Dr. Yoni Rizo M.D.               Results for orders placed during the hospital encounter of 08/04/17    Adult Transthoracic Echo Complete With Contrast    Interpretation Summary  · Left ventricular systolic function is moderately decreased. Calculated EF = 36.9%. Estimated EF was in agreement with the calculated EF. There is left ventricular global hypokinesis noted. The left ventricular cavity is moderately dilated. Left ventricular diastolic was unable to be assessed.  · Right ventricular cavity is mildly dilated. Mildly reduced right ventricular systolic function noted.  · Left atrial cavity size is moderately dilated.  · Estimated right  ventricular systolic pressure from tricuspid regurgitation is mildly elevated (35-45 mmHg). Calculated right ventricular systolic pressure from tricuspid regurgitation is 37 mmHg.      ECG 12 Lead Dyspnea   Preliminary Result   HEART RATE= 70  bpm   RR Interval= 857  ms   VA Interval=   ms   P Horizontal Axis=   deg   P Front Axis=   deg   QRSD Interval= 111  ms   QT Interval= 422  ms   QTcB= 456  ms   QRS Axis= 41  deg   T Wave Axis= 60  deg   - ABNORMAL ECG -   Atrial fibrillation   Low voltage, extremity and precordial leads   Consider anterior infarct   Electronically Signed By:    Date and Time of Study: 2023-08-28 10:02:02           Assessment/Plan     Active Hospital Problems    Diagnosis  POA    **Acute on chronic systolic CHF (congestive heart failure) [I50.23]  Yes    Pulmonary hypertension [I27.20]  Yes    Essential hypertension [I10]  Yes    NICM (nonischemic cardiomyopathy) [I42.8]  Yes    Morbid obesity [E66.01]  Yes    Persistent atrial fibrillation [I48.19]  Yes      Resolved Hospital Problems   No resolved problems to display.       Ms. Talavera is a 78 y.o.  admitted with acute on chronic CHF    Acute on chronic systolic CHF  Followed by Dr. Martines.  Last echocardiogram 2017 with EF 37%.  On torsemide, metoprolol, empagliflozin   BNP elevated 2924, CXR with vascular congestion. Troponin 18, repeat troponin. EKG without acute change  Given IV lasix 80mg in ER. Continue Lasix 40mg IV BID and consult cardiology. Strict IO and weights.     Permanent AF- HR controlled, on BB and Rivaroxaban   Pulmonary HTN    Morbid obesity - complicating all above.        VTE Prophylaxis - Xarelto   Code Status - DNR.       SADAF Zhang  [facility]  08/28/23  11:31 EDT

## 2023-08-28 NOTE — PROGRESS NOTES
Clinical Pharmacy Services: Medication History    Unique Talavera is a 78 y.o. female presenting to [facility] for   Chief Complaint   Patient presents with    Shortness of Breath       She  has a past medical history of Acute on chronic congestive heart failure, Afib, Arthritis, Atrial fibrillation status post cardioversion (04/15/2016), Cardiomyopathy, Chronic systolic congestive heart failure, Coronary artery disease, Daytime sleepiness, Depression, Dizziness, Hypertension, Leukocytosis, NICM (nonischemic cardiomyopathy), Obesity, Permanent atrial fibrillation, Persistent atrial fibrillation, and Vertigo.    Allergies as of 08/28/2023    (No Known Allergies)       Medication information was obtained from: Patient   Pharmacy and Phone Number:     Prior to Admission Medications       Prescriptions Last Dose Informant Patient Reported? Taking?    empagliflozin (Jardiance) 10 MG tablet tablet 8/27/2023 Self No Yes    Take 1 tablet by mouth Daily.    ibuprofen (ADVIL,MOTRIN) 200 MG tablet 8/28/2023 Self Yes Yes    Take 4 tablets by mouth Every Morning.    metoprolol succinate XL (TOPROL-XL) 100 MG 24 hr tablet 8/28/2023 Self No Yes    Take 1 tablet by mouth 2 (Two) Times a Day.    Multiple Vitamins-Minerals (PRESERVISION AREDS 2 PO) 8/27/2023 Self Yes Yes    Take 1 dose by mouth 2 (Two) Times a Day.    potassium chloride ER (K-TAB) 20 MEQ tablet controlled-release ER tablet 8/27/2023 Self No Yes    TAKE 1 TABLET BY MOUTH TWICE A DAY    Patient taking differently:  Take 1 tablet by mouth 2 (Two) Times a Day.    rivaroxaban (Xarelto) 20 MG tablet 8/27/2023 Self No Yes    Take 1 tablet by mouth Daily With Dinner.    torsemide (DEMADEX) 20 MG tablet 8/27/2023 Self No Yes    TAKE TWO TABLETS BY MOUTH EVERY 12 HOURS    Patient taking differently:  Take 2 tablets by mouth 2 (Two) Times a Day.              Medication notes:     This medication list is complete to the best of my knowledge as of 8/28/2023    Please  call if questions.    Bg Novak  Medication History Technician  [phone]    8/28/2023 10:31 EDT

## 2023-08-29 LAB
ANION GAP SERPL CALCULATED.3IONS-SCNC: 10.8 MMOL/L (ref 5–15)
BASOPHILS # BLD AUTO: 0.04 10*3/MM3 (ref 0–0.2)
BASOPHILS NFR BLD AUTO: 0.5 % (ref 0–1.5)
BUN SERPL-MCNC: 29 MG/DL (ref 8–23)
BUN/CREAT SERPL: 20.4 (ref 7–25)
CALCIUM SPEC-SCNC: 8.2 MG/DL (ref 8.6–10.5)
CHLORIDE SERPL-SCNC: 104 MMOL/L (ref 98–107)
CO2 SERPL-SCNC: 26.2 MMOL/L (ref 22–29)
CREAT SERPL-MCNC: 1.42 MG/DL (ref 0.57–1)
DEPRECATED RDW RBC AUTO: 43.3 FL (ref 37–54)
EGFRCR SERPLBLD CKD-EPI 2021: 37.7 ML/MIN/1.73
EOSINOPHIL # BLD AUTO: 0.27 10*3/MM3 (ref 0–0.4)
EOSINOPHIL NFR BLD AUTO: 3.3 % (ref 0.3–6.2)
ERYTHROCYTE [DISTWIDTH] IN BLOOD BY AUTOMATED COUNT: 13 % (ref 12.3–15.4)
GLUCOSE SERPL-MCNC: 94 MG/DL (ref 65–99)
HCT VFR BLD AUTO: 37.8 % (ref 34–46.6)
HGB BLD-MCNC: 12.5 G/DL (ref 12–15.9)
IMM GRANULOCYTES # BLD AUTO: 0.03 10*3/MM3 (ref 0–0.05)
IMM GRANULOCYTES NFR BLD AUTO: 0.4 % (ref 0–0.5)
LYMPHOCYTES # BLD AUTO: 2.15 10*3/MM3 (ref 0.7–3.1)
LYMPHOCYTES NFR BLD AUTO: 26 % (ref 19.6–45.3)
MCH RBC QN AUTO: 30.3 PG (ref 26.6–33)
MCHC RBC AUTO-ENTMCNC: 33.1 G/DL (ref 31.5–35.7)
MCV RBC AUTO: 91.7 FL (ref 79–97)
MONOCYTES # BLD AUTO: 0.74 10*3/MM3 (ref 0.1–0.9)
MONOCYTES NFR BLD AUTO: 9 % (ref 5–12)
NEUTROPHILS NFR BLD AUTO: 5.03 10*3/MM3 (ref 1.7–7)
NEUTROPHILS NFR BLD AUTO: 60.8 % (ref 42.7–76)
NRBC BLD AUTO-RTO: 0 /100 WBC (ref 0–0.2)
PLATELET # BLD AUTO: 188 10*3/MM3 (ref 140–450)
PMV BLD AUTO: 9.9 FL (ref 6–12)
POTASSIUM SERPL-SCNC: 3.9 MMOL/L (ref 3.5–5.2)
RBC # BLD AUTO: 4.12 10*6/MM3 (ref 3.77–5.28)
SODIUM SERPL-SCNC: 141 MMOL/L (ref 136–145)
URATE SERPL-MCNC: 9.3 MG/DL (ref 2.4–5.7)
WBC NRBC COR # BLD: 8.26 10*3/MM3 (ref 3.4–10.8)

## 2023-08-29 PROCEDURE — 99214 OFFICE O/P EST MOD 30 MIN: CPT | Performed by: NURSE PRACTITIONER

## 2023-08-29 PROCEDURE — 85025 COMPLETE CBC W/AUTO DIFF WBC: CPT | Performed by: INTERNAL MEDICINE

## 2023-08-29 PROCEDURE — 25010000002 FUROSEMIDE PER 20 MG: Performed by: NURSE PRACTITIONER

## 2023-08-29 PROCEDURE — 80048 BASIC METABOLIC PNL TOTAL CA: CPT | Performed by: INTERNAL MEDICINE

## 2023-08-29 PROCEDURE — 96376 TX/PRO/DX INJ SAME DRUG ADON: CPT

## 2023-08-29 PROCEDURE — G0378 HOSPITAL OBSERVATION PER HR: HCPCS

## 2023-08-29 PROCEDURE — 84550 ASSAY OF BLOOD/URIC ACID: CPT | Performed by: NURSE PRACTITIONER

## 2023-08-29 PROCEDURE — 97161 PT EVAL LOW COMPLEX 20 MIN: CPT

## 2023-08-29 RX ORDER — TORSEMIDE 20 MG/1
60 TABLET ORAL
Status: DISCONTINUED | OUTPATIENT
Start: 2023-08-29 | End: 2023-08-30 | Stop reason: HOSPADM

## 2023-08-29 RX ADMIN — EMPAGLIFLOZIN 10 MG: 10 TABLET, FILM COATED ORAL at 10:07

## 2023-08-29 RX ADMIN — RIVAROXABAN 15 MG: 15 TABLET, FILM COATED ORAL at 17:32

## 2023-08-29 RX ADMIN — METOPROLOL SUCCINATE 100 MG: 100 TABLET, EXTENDED RELEASE ORAL at 20:53

## 2023-08-29 RX ADMIN — TORSEMIDE 60 MG: 20 TABLET ORAL at 17:32

## 2023-08-29 RX ADMIN — METOPROLOL SUCCINATE 100 MG: 100 TABLET, EXTENDED RELEASE ORAL at 10:07

## 2023-08-29 RX ADMIN — FUROSEMIDE 40 MG: 10 INJECTION, SOLUTION INTRAMUSCULAR; INTRAVENOUS at 10:07

## 2023-08-29 RX ADMIN — POTASSIUM CHLORIDE 20 MEQ: 750 TABLET, EXTENDED RELEASE ORAL at 10:07

## 2023-08-29 RX ADMIN — POTASSIUM CHLORIDE 20 MEQ: 750 TABLET, EXTENDED RELEASE ORAL at 20:53

## 2023-08-29 NOTE — PROGRESS NOTES
Name: Unique Talavera ADMIT: 2023   : 1945  PCP: Elvis Diaz MD    MRN: [id number] LOS: 0 days   AGE/SEX: 78 y.o. female  ROOM: [facility]     Subjective   Subjective   She is feeling better. Legs less edematous. No SOB CP fever chills.        Objective   Objective   Vital Signs  Temp:  [97.5 °F (36.4 °C)-98.8 °F (37.1 °C)] 98.2 °F (36.8 °C)  Heart Rate:  [72-86] 83  Resp:  [20] 20  BP: (104-129)/(53-78) 125/75  SpO2:  [91 %-94 %] 91 %  on   ;   Device (Oxygen Therapy): room air  Body mass index is 48.02 kg/m².  Physical Exam  Vitals reviewed.   Constitutional:       Appearance: She is well-developed. She is obese. She is not ill-appearing.   HENT:      Head: Normocephalic and atraumatic.      Mouth/Throat:      Mouth: Mucous membranes are moist.   Cardiovascular:      Rate and Rhythm: Normal rate and regular rhythm.   Pulmonary:      Effort: Pulmonary effort is normal. No respiratory distress.      Breath sounds: Normal breath sounds.   Abdominal:      General: Bowel sounds are normal. There is no distension.      Palpations: Abdomen is soft.      Tenderness: There is no abdominal tenderness.   Musculoskeletal:      Right lower leg: Edema present.      Left lower leg: Edema present.      Comments: Edema improved but difficult to  with severe obesity    Skin:     General: Skin is warm and dry.   Neurological:      General: No focal deficit present.      Mental Status: She is alert and oriented to person, place, and time.   Psychiatric:         Mood and Affect: Mood normal.         Behavior: Behavior normal.         Thought Content: Thought content normal.     Results Review     I reviewed the patient's new clinical results.  Results from last 7 days   Lab Units 23  0436 23  0901   WBC 10*3/mm3 8.26 9.21   HEMOGLOBIN g/dL 12.5 14.1   PLATELETS 10*3/mm3 188 227     Results from last 7 days   Lab Units 23  0436 23  0901   SODIUM mmol/L 141 141   POTASSIUM mmol/L 3.9 4.7    CHLORIDE mmol/L 104 104   CO2 mmol/L 26.2 24.5   BUN mg/dL 29* 32*   CREATININE mg/dL 1.42* 1.59*   GLUCOSE mg/dL 94 100*   EGFR mL/min/1.73 37.7* 32.9*     Results from last 7 days   Lab Units 08/28/23  0901   ALBUMIN g/dL 4.0   BILIRUBIN mg/dL 0.3   ALK PHOS U/L 133*   AST (SGOT) U/L 20   ALT (SGPT) U/L 11     Results from last 7 days   Lab Units 08/29/23  0436 08/28/23  0901   CALCIUM mg/dL 8.2* 8.8   ALBUMIN g/dL  --  4.0       No results found for: HGBA1C, POCGLU    XR Chest 1 View    Result Date: 8/28/2023  As described.  This report was finalized on 8/28/2023 9:28 AM by Dr. Yoni Rizo M.D.       I have personally reviewed all medications:  Scheduled Medications  empagliflozin, 10 mg, Oral, Daily  furosemide, 40 mg, Intravenous, Q12H  metoprolol succinate XL, 100 mg, Oral, BID  potassium chloride, 20 mEq, Oral, BID  rivaroxaban, 15 mg, Oral, Daily With Dinner    Infusions   Diet  Diet: Cardiac Diets; Healthy Heart (2-3 Na+); Texture: Regular Texture (IDDSI 7); Fluid Consistency: Thin (IDDSI 0)    I have personally reviewed:  [x]  Laboratory   [x]  Microbiology   [x]  Radiology   [x]  EKG/Telemetry  [x]  Cardiology/Vascular   []  Pathology    []  Records       Assessment/Plan     Active Hospital Problems    Diagnosis  POA    **Acute on chronic systolic CHF (congestive heart failure) [I50.23]  Yes    GAVIOTA (acute kidney injury) [N17.9]  Yes    Pulmonary hypertension [I27.20]  Yes    Essential hypertension [I10]  Yes    NICM (nonischemic cardiomyopathy) [I42.8]  Yes    Morbid obesity [E66.01]  Yes    Persistent atrial fibrillation [I48.19]  Yes      Resolved Hospital Problems   No resolved problems to display.       78 y.o. female admitted with Acute on chronic systolic CHF (congestive heart failure).    Acute on chronic CHF   IV lasix 80mg IV in ER and lasix 40mg IV continued q 12 hrs.   UOP 2500cc  Weight 298 not 288 but will ask RN to repeat   Appreciate     GAVIOTA   Secondary to volume overload. Crt  baseline 1.0 compared to 1.59 on admission. Trending down with diuresis. Monitor.     Permanent AF- HR controlled, on BB and Rivaroxaban   Pulmonary HTN  Morbid obesity - complicating all above.         VTE Prophylaxis - Xarelto    DNR  Discussed with patient and nursing staff.  Anticipate discharge home tomorrow..      SADAF Zhang  [facility]  08/29/23  10:52 EDT

## 2023-08-29 NOTE — PLAN OF CARE
"Goal Outcome Evaluation:  Plan of Care Reviewed With: patient           Outcome Evaluation: Patient is a 78 y.o female who presented to [facility] with SOA and leg swelling. Patient AOx4 sitting EOB with nsg. Patient lives at home alone and reports she uses a cane and rwx for mobility. Patient reports she has 2 sons that check in on her. Patient reports no history of falls. Patient appeared agitated by PT presence in room stating \"you are not going to tell me if I can go home or not.\" Education provided on purpose of PT evaluation for safe d/c recommendations. Patient performed STS from EOB Mod(I). Patient ambulated 30ft in room with rwx and SV. Gait steady with no overt LOB noted. Patient declines need for further acute care PT. PT will s/o at this time.      Anticipated Discharge Disposition (PT): home with assist  "

## 2023-08-29 NOTE — CONSULTS
Electrophysiology Hospital Consult            Patient Name: Unique Talavera  Age/Sex: 78 y.o. female  : 1945  MRN: [id number]    Date of Admission: 2023  Date of Encounter Visit: 23  Encounter Provider: SADAF Quintero  Referring Provider: Aidan Snowden MD  Place of Service: [facility] CARDIOLOGY  Patient Care Team:  Elvis Diaz MD as PCP - General (Family Medicine)  Michael Sorensen MD PhD as Consulting Physician (Cardiology)      Subjective:   EP Consultation for: Heart Failure,     Chief Complaint: Dyspnea, edema    History of Present Illness:  Unique Talavera is a 78 y.o. female well known to our group, follows with Dr. Martines.     Perm afib, NICM, HTN, chronic pain and morbid obesity.     Dec of 2022 was referred to [facility], last seen by them in March with plans for follow up in July but she cancelled the appointment.     She did see me in  and was stable at that time.     She has a history of some intermittent non compliance and also adjust her diuretics herself.     Many discussions with her regarding goals of care over the years-is a DNR/DNI and her goals of care are really just palliative and remaining out of the hospital. She has refused repeat echo or sleep evaluation.     Presented to ED yesterday with complaints of worsening dyspnea and LE edema. CXR with mild vascular congestion, proBNP 2924, creat 1.59.     She received 80 mg of IV furosemide yesterday morning and 40 mg IV last night--currently 40 mg IV q12 hours ordered.     This morning she is sitting up in bed eating breakfast, lungs are clear, no pitting edema of lower extremities.     She had documented output of 2500cc of urine, + x1---weight recorded initially as 298 lbs, repeat 288 lbs---?accuracy.     Past Medical History:  Past Medical History:   Diagnosis Date    Acute on chronic congestive heart failure     Afib     Arthritis     Atrial  fibrillation status post cardioversion 04/15/2016    Cardiomyopathy     Chronic systolic congestive heart failure     Coronary artery disease     Daytime sleepiness     Depression     Dizziness     Hypertension     Leukocytosis     NICM (nonischemic cardiomyopathy)     Obesity     morbid    Permanent atrial fibrillation     Persistent atrial fibrillation     Vertigo        Past Surgical History:   Procedure Laterality Date    CARDIAC CATHETERIZATION N/A 2016    Procedure: Left Heart Cath;  Surgeon: Jostin Arce MD;  Location:  [facility];  Service:     CARDIAC CATHETERIZATION N/A 2016    Procedure: Right Heart Cath;  Surgeon: Jostin Arce MD;  Location:  [facility];  Service:      SECTION       SECTION      KNEE SURGERY      WRIST SURGERY         Home Medications:   Medications Prior to Admission   Medication Sig Dispense Refill Last Dose    empagliflozin (Jardiance) 10 MG tablet tablet Take 1 tablet by mouth Daily. 30 tablet 3 2023    ibuprofen (ADVIL,MOTRIN) 200 MG tablet Take 4 tablets by mouth Every Morning.   2023    metoprolol succinate XL (TOPROL-XL) 100 MG 24 hr tablet Take 1 tablet by mouth 2 (Two) Times a Day. 180 tablet 3 2023    Multiple Vitamins-Minerals (PRESERVISION AREDS 2 PO) Take 1 dose by mouth 2 (Two) Times a Day.   2023    potassium chloride ER (K-TAB) 20 MEQ tablet controlled-release ER tablet TAKE 1 TABLET BY MOUTH TWICE A DAY (Patient taking differently: Take 1 tablet by mouth 2 (Two) Times a Day.) 60 tablet 0 2023    rivaroxaban (Xarelto) 20 MG tablet Take 1 tablet by mouth Daily With Dinner. 28 tablet 0 2023    torsemide (DEMADEX) 20 MG tablet TAKE TWO TABLETS BY MOUTH EVERY 12 HOURS (Patient taking differently: Take 2 tablets by mouth 2 (Two) Times a Day.) 120 tablet 0 2023       Allergies:  No Known Allergies    Past Social History:  Social History     Socioeconomic History     Marital status:    Tobacco Use    Smoking status: Former     Types: Cigarettes     Quit date:      Years since quittin.6    Smokeless tobacco: Never   Vaping Use    Vaping Use: Never used   Substance and Sexual Activity    Alcohol use: Not Currently     Comment: caffeine use: 3 cups daily     Drug use: No    Sexual activity: Defer       Past Family History:  Family History   Problem Relation Age of Onset    Heart disease Mother     Stroke Father        Review of Systems: All systems reviewed. Pertinent positives identified in HPI. All other systems are negative.     14 point ROS was performed and is negative except as outlined in HPI.     Objective:     Objective:  Vital Signs (last 24 hours)          0700   0659  0700   0752   Most Recent      Temp (°F) 97.5 -  98.8       98.2 (36.8) 2313    Heart Rate 66 -  86       79 2313    Resp 20 -  22       20 2313    /62 -  129/63       104/62 2313    SpO2 (%) 90 -  95       93 2313          Temp:  [97.5 °F (36.4 °C)-98.8 °F (37.1 °C)] 98.2 °F (36.8 °C)  Heart Rate:  [66-86] 79  Resp:  [20-22] 20  BP: (104-129)/(53-78) 104/62  Body mass index is 48.02 kg/m².        Physical Exam:     General Appearance: No acute distress, morbidly obese  Eyes: Conjunctiva and lids: No erythema, swelling, or discharge. Sclera non-icteric.   HENT: Atraumatic, normocephalic. External eyes, ears, and nose normal.   Respiratory: No signs of respiratory distress. Respiration rhythm and depth normal.   Clear to auscultation. No rales, crackles, rhonchi, or wheezing auscultated.   Cardiovascular:  Heart Rate and Rhythm: Irregularly, irregular Heart Sounds: S1 & S2  Lower Extremities: Difficult to assess due to morbid obesity but she has not pitting edema of her lower extremities.   Gastrointestinal:  Abdomen obese  Skin: Warm and dry.   Psychiatric: Patient alert and oriented to person, place, and time. Speech and behavior  appropriate. Normal mood and affect.    Labs:   Lab Review:     Results from last 7 days   Lab Units 23  0436 23  0901   SODIUM mmol/L 141 141   POTASSIUM mmol/L 3.9 4.7   CHLORIDE mmol/L 104 104   CO2 mmol/L 26.2 24.5   BUN mg/dL 29* 32*   CREATININE mg/dL 1.42* 1.59*   GLUCOSE mg/dL 94 100*   CALCIUM mg/dL 8.2* 8.8   AST (SGOT) U/L  --  20   ALT (SGPT) U/L  --  11     Results from last 7 days   Lab Units 23  1623 23  1408 23  0901   HSTROP T ng/L 13* 12* 18*     Results from last 7 days   Lab Units 23  0436   WBC 10*3/mm3 8.26   HEMOGLOBIN g/dL 12.5   HEMATOCRIT % 37.8   PLATELETS 10*3/mm3 188                     Results from last 7 days   Lab Units 23  0901   PROBNP pg/mL 2,924.0*     Imagin2023 CXR:  XR CHEST 1 VW-     HISTORY: Female who is 78 years-old, short of breath     TECHNIQUE: Frontal view of the chest     COMPARISON: 2022     FINDINGS: The heart is enlarged with mild pulmonary vascular congestion.  Minimal likely atelectasis in the left lower lung. No pleural effusion,  or pneumothorax. No acute osseous process.     IMPRESSION:  As described.     This report was finalized on 2023 9:28 AM by Dr. Yoni Rizo M.D.    2017 Echo:    Interpretation Summary    Left ventricular systolic function is moderately decreased. Calculated EF = 36.9%. Estimated EF was in agreement with the calculated EF. There is left ventricular global hypokinesis noted. The left ventricular cavity is moderately dilated. Left ventricular diastolic was unable to be assessed.  Right ventricular cavity is mildly dilated. Mildly reduced right ventricular systolic function noted.  Left atrial cavity size is moderately dilated.  Estimated right ventricular systolic pressure from tricuspid regurgitation is mildly elevated (35-45 mmHg). Calculated right ventricular systolic pressure from tricuspid regurgitation is 37 mmHg.       EKG:         I personally viewed and  "interpreted the patient's EKG/Telemetry tracings.    Assessment:       Acute on chronic systolic CHF (congestive heart failure)    Persistent atrial fibrillation    Morbid obesity    NICM (nonischemic cardiomyopathy)    Essential hypertension    Pulmonary hypertension    GAVIOTA (acute kidney injury)        Plan:     Acute on chronic HFrEF---she has responded well to IV diuretics---she is getting 3rd dose this am.     She is on room air, lungs are clear and no pitting edema of lower extremities this morning.     When I ask her what dose of oral diuretics she was taking at home she says 2 tablets twice a day--so I think 40 mg BID and she reports compliance with taking the empagliflozin.     When I mentioned transitioning her back to oral diuretics she says \"well if your going to do that then you can just send me home, I can take tablets at home.\"    Explained that I think one more dose of IV diuretics this morning, see how she does and consider transitioning to oral diuretics this afternoon and if stable tomorrow then yes probably dc home.     I ask her why she cancelled appt with [facility] in July--\"I did not feel well so I didn't go.\" She also says it is expensive, I will send a message to see if they have any additional assistance for that situation.     I did confirm with her that her goals of care have not changed, she does not want anything invasive done, DNR/DNI.     Communicated with RN.     Thank you for allowing me to participate in the care of Unique Talavera. Feel free to contact me directly with any further questions or concerns.    SADAF Quintero  [facility]  08/29/23  07:52 EDT    "

## 2023-08-29 NOTE — CASE MANAGEMENT/SOCIAL WORK
Discharge Planning Assessment  [facility]     Patient Name: Unique Talavera  MRN: [id number]  Today's Date: 8/29/2023    Admit Date: 8/28/2023    Plan: Home. Denies any needs. Family to transport.   Discharge Needs Assessment       Row Name 08/29/23 1626       Living Environment    People in Home alone    Current Living Arrangements home    Potentially Unsafe Housing Conditions none    Primary Care Provided by self    Provides Primary Care For no one, unable/limited ability to care for self    Family Caregiver if Needed child(renny), adult    Quality of Family Relationships supportive;involved    Able to Return to Prior Arrangements yes       Resource/Environmental Concerns    Resource/Environmental Concerns none    Transportation Concerns none       Transition Planning    Patient/Family Anticipates Transition to home    Patient/Family Anticipated Services at Transition none    Transportation Anticipated family or friend will provide       Discharge Needs Assessment    Readmission Within the Last 30 Days no previous admission in last 30 days    Equipment Currently Used at Home walker, rolling;grab bar;bath bench;cane, straight;scales    Concerns to be Addressed care coordination/care conferences;discharge planning    Anticipated Changes Related to Illness none    Equipment Needed After Discharge none    Provided Post Acute Provider List? Refused    Refused Provider List Comment Denies any needs    Current Discharge Risk chronically ill;lack of support system/caregiver;lives alone                   Discharge Plan       Row Name 08/29/23 1631       Plan    Plan Home. Denies any needs. Family to transport.    Patient/Family in Agreement with Plan yes    Plan Comments Met with pt. at bedside. Explained roll of . Face sheet and pharmacy verified. Pt lives alone in a single-story home.  There are no steps to enter home. Home DME includes a walker, grab bars, Bath bench, cane, and scales.  Pt is independent with  ADLs. Pt has never been to Rehab but has used HH in the past. Unable to recall name of HH agency. Pt's PCP is Elvis Diaz MD. Uses [facility] on [facility]. Pt drives herself to appointments. At discharge, family will transport. Pt denies any discharge needs. Explained that CCP would follow to assess for discharge needs.  Cosme Dozier RN-BC                  Continued Care and Services - Admitted Since 8/28/2023    Coordination has not been started for this encounter.       Expected Discharge Date and Time       Expected Discharge Date Expected Discharge Time    Aug 30, 2023            Demographic Summary       Row Name 08/29/23 1625       General Information    Admission Type observation    Arrived From emergency department    Required Notices Provided Observation Status Notice    Reason for Consult discharge planning;decision-making;care coordination/care conference    Preferred Language English                   Functional Status       Row Name 08/29/23 1625       Functional Status    Usual Activity Tolerance moderate    Current Activity Tolerance moderate       Functional Status, IADL    Medications assistive equipment    Meal Preparation assistive equipment    Housekeeping assistive equipment    Laundry assistive equipment    Shopping assistive equipment       Mental Status    General Appearance WDL WDL       Mental Status Summary    Recent Changes in Mental Status/Cognitive Functioning no changes       Employment/    Employment Status retired                      Cosme Dozier, RN

## 2023-08-29 NOTE — THERAPY EVALUATION
Patient Name: Unique Talavera  : 1945    MRN: [id number]                              Today's Date: 2023       Admit Date: 2023    Visit Dx:     ICD-10-CM ICD-9-CM   1. Acute on chronic systolic congestive heart failure  I50.23 428.23     428.0   2. Acute renal insufficiency  N28.9 593.9     Patient Active Problem List   Diagnosis    Persistent atrial fibrillation    Arthritis    Morbid obesity    NICM (nonischemic cardiomyopathy)    Vertigo    Daytime sleepiness    DNR (do not resuscitate)    Essential hypertension    Pulmonary hypertension    Sleep apnea-like behavior    Acute on chronic systolic CHF (congestive heart failure)    GAVIOTA (acute kidney injury)     Past Medical History:   Diagnosis Date    Acute on chronic congestive heart failure     Afib     Arthritis     Atrial fibrillation status post cardioversion 04/15/2016    Cardiomyopathy     Chronic systolic congestive heart failure     Coronary artery disease     Daytime sleepiness     Depression     Dizziness     Hypertension     Leukocytosis     NICM (nonischemic cardiomyopathy)     Obesity     morbid    Permanent atrial fibrillation     Persistent atrial fibrillation     Vertigo      Past Surgical History:   Procedure Laterality Date    CARDIAC CATHETERIZATION N/A 2016    Procedure: Left Heart Cath;  Surgeon: Jostin Arce MD;  [facility];  Service:     CARDIAC CATHETERIZATION N/A 2016    Procedure: Right Heart Cath;  Surgeon: Jostin Arce MD;  [facility];  Service:      SECTION       SECTION      KNEE SURGERY      WRIST SURGERY        General Information       Row Name 23 1112          Physical Therapy Time and Intention    Document Type discharge evaluation/summary  -SM     Mode of Treatment physical therapy  -       Row Name 23 1112          General Information    Patient Profile Reviewed yes  -SM     Prior Level of Function  independent:  -       Row Name 08/29/23 1112          Living Environment    People in Home alone  -[facility] Name 08/29/23 1112          Home Main Entrance    Number of Stairs, Main Entrance none  -[facility] Name 08/29/23 1112          Cognition    Orientation Status (Cognition) oriented x 4  -               User Key  (r) = Recorded By, (t) = Taken By, (c) = Cosigned By      Initials Name Provider Type     Noelle Wright PT Physical Therapist                   Mobility       [facility] Name 08/29/23 1112          Bed Mobility    Comment, (Bed Mobility) Patient sitting EOB  -[facility] Name 08/29/23 1112          Sit-Stand Transfer    Sit-Stand [place] (Transfers) modified independence  -[facility] Name 08/29/23 1112          Gait/Stairs (Locomotion)    [place] Level (Gait) supervision  -     Assistive Device (Gait) walker, front-wheeled  -     Distance in Feet (Gait) 30ft  -     Comment, (Gait/Stairs) Gait steady with no LOB noted.  -               User Key  (r) = Recorded By, (t) = Taken By, (c) = Cosigned By      Initials Name Provider Type     Noelle Wright PT Physical Therapist                   Obj/Interventions       Row Name 08/29/23 1113          Range of Motion Comprehensive    Comment, General Range of Motion Patient did not allow therapist to assess  -SM       Row Name 08/29/23 1113          Strength Comprehensive (MMT)    Comment, General Manual Muscle Testing (MMT) Assessment Patient did not allow therapist to assess  -               User Key  (r) = Recorded By, (t) = Taken By, (c) = Cosigned By      Initials Name Provider Type     Noelle Wright PT Physical Therapist                   Goals/Plan    No documentation.                  Clinical Impression       [facility] Name 08/29/23 1113          Pain    Pretreatment Pain Rating 0/10 - no pain  -     Posttreatment Pain Rating 0/10 - no pain  -SM       Row Name 08/29/23 1113          Plan of Care Review    Plan  "of Care Reviewed With patient  -SM     Outcome Evaluation Patient is a 78 y.o female who presented to [facility] with SOA and leg swelling. Patient AOx4 sitting EOB with nsg. Patient lives at home alone and reports she uses a cane and rwx for mobility. Patient reports she has 2 sons that check in on her. Patient reports no history of falls. Patient appeared agitated by PT presence in room stating \"you are not going to tell me if I can go home or not.\" Education provided on purpose of PT evaluation for safe d/c recommendations. Patient performed STS from EOB Mod(I). Patient ambulated 30ft in room with rwx and SV. Gait steady with no overt LOB noted. Patient declines need for further acute care PT. PT will s/o at this time.  -       Row Name 08/29/23 1113          Therapy Assessment/Plan (PT)    Criteria for Skilled Interventions Met (PT) no problems identified which require skilled intervention  -     Therapy Frequency (PT) evaluation only  -       Row Name 08/29/23 1113          Vital Signs    Pre Patient Position Sitting  -SM     Intra Patient Position Standing  -SM     Post Patient Position Sitting  -SM       Row Name 08/29/23 1113          Positioning and Restraints    Pre-Treatment Position in bed  -SM     Post Treatment Position bed  -SM     In Bed with nsg;sitting EOB  -SM               User Key  (r) = Recorded By, (t) = Taken By, (c) = Cosigned By      Initials Name Provider Type    Noelle Alonso, PT Physical Therapist                   Outcome Measures       Row Name 08/29/23 1117          How much help from another person do you currently need...    Turning from your back to your side while in flat bed without using bedrails? 4  -SM     Moving from lying on back to sitting on the side of a flat bed without bedrails? 4  -SM     Moving to and from a bed to a chair (including a wheelchair)? 4  -SM     Standing up from a chair using your arms (e.g., wheelchair, bedside chair)? 4  -SM     Climbing 3-5 " "steps with a railing? 3  -     To walk in hospital room? 4  -SM     AM-PAC 6 Clicks Score (PT) 23  -SM               User Key  (r) = Recorded By, (t) = Taken By, (c) = Cosigned By      Initials Name Provider Type     Noelle Wright, KAMLESH Physical Therapist                                 Physical Therapy Education       Title: PT OT SLP Therapies (In Progress)       Topic: Physical Therapy (In Progress)       Point: Mobility training (Done)       Learning Progress Summary             Patient Acceptance, E, VU by  at 8/29/2023 1118                         Point: Home exercise program (Not Started)       Learner Progress:  Not documented in this visit.              Point: Body mechanics (Not Started)       Learner Progress:  Not documented in this visit.              Point: Precautions (Not Started)       Learner Progress:  Not documented in this visit.                              User Key       Initials Effective Dates Name Provider Type Discipline     05/02/22 -  Noelle Wright PT Physical Therapist PT                  PT Recommendation and Plan     Plan of Care Reviewed With: patient  Outcome Evaluation: Patient is a 78 y.o female who presented to [facility] with SOA and leg swelling. Patient AOx4 sitting EOB with nsg. Patient lives at home alone and reports she uses a cane and rwx for mobility. Patient reports she has 2 sons that check in on her. Patient reports no history of falls. Patient appeared agitated by PT presence in room stating \"you are not going to tell me if I can go home or not.\" Education provided on purpose of PT evaluation for safe d/c recommendations. Patient performed STS from EOB Mod(I). Patient ambulated 30ft in room with rwx and SV. Gait steady with no overt LOB noted. Patient declines need for further acute care PT. PT will s/o at this time.     Time Calculation:         PT Charges       Row Name 08/29/23 1118             Time Calculation    Start Time 1007  -      Stop Time 1015  " -SM      Time Calculation (min) 8 min  -      PT Received On 08/29/23  -                User Key  (r) = Recorded By, (t) = Taken By, (c) = Cosigned By      Initials Name Provider Type    Noelle Alonso PT Physical Therapist                  Therapy Charges for Today       Code Description Service Date Service Provider Modifiers Qty    [id number] HC PT EVAL LOW COMPLEXITY 3 8/29/2023 Noelle Wright, KAMLESH GP 1            PT G-Codes  AM-PAC 6 Clicks Score (PT): 23  PT Discharge Summary  Anticipated Discharge Disposition (PT): home with assist    Noelle Wright PT  8/29/2023

## 2023-08-29 NOTE — PLAN OF CARE
Goal Outcome Evaluation:Patient given lasix , that what she wants. Her oxygen saturation drops when she sleep, so I asked her if she can wear some oxygen per NC at first she agree and later she removed it and said it burn her nose trill . No other complaints made, continue to monitor.

## 2023-08-29 NOTE — CASE MANAGEMENT/SOCIAL WORK
Continued Stay Note  [facility]     Patient Name: Unique Talavera  MRN: [id number]  Today's Date: 8/29/2023    Admit Date: 8/28/2023    Plan: Home. Denies any needs. Family to transport.   Discharge Plan       Row Name 08/29/23 1631       Plan    Plan Home. Denies any needs. Family to transport.    Patient/Family in Agreement with Plan yes    Plan Comments Met with pt. at bedside. Explained roll of . Face sheet and pharmacy verified. Pt lives alone in a single-story home.  There are no steps to enter home. Home DME includes a walker, grab bars, Bath bench, cane, and scales.  Pt is independent with ADLs. Pt has never been to Rehab but has used HH in the past. Unable to recall name of HH agency. Pt's PCP is Elvis Diaz MD. Uses [facility] on [facility]. Pt drives herself to appointments. At discharge, family will transport. Pt denies any discharge needs. Explained that CCP would follow to assess for discharge needs.  Cosme Dozier RN-BC                   Discharge Codes    No documentation.                 Expected Discharge Date and Time       Expected Discharge Date Expected Discharge Time    Aug 30, 2023               Cosme Dozier RN

## 2023-08-29 NOTE — PLAN OF CARE
Problem: Adult Inpatient Plan of Care  Goal: Plan of Care Review  Outcome: Ongoing, Progressing  Flowsheets (Taken 8/29/2023 1957)  Progress: improving  Plan of Care Reviewed With: patient  Outcome Evaluation: Pt vital stable. No c/o pain. Diuretic changed to po. Pt safety maintained

## 2023-08-30 VITALS
HEIGHT: 65 IN | TEMPERATURE: 97.7 F | SYSTOLIC BLOOD PRESSURE: 123 MMHG | DIASTOLIC BLOOD PRESSURE: 71 MMHG | BODY MASS INDEX: 48.1 KG/M2 | HEART RATE: 81 BPM | OXYGEN SATURATION: 97 % | RESPIRATION RATE: 17 BRPM | WEIGHT: 288.7 LBS

## 2023-08-30 PROBLEM — N17.9 AKI (ACUTE KIDNEY INJURY): Status: RESOLVED | Noted: 2023-08-28 | Resolved: 2023-08-30

## 2023-08-30 LAB
ANION GAP SERPL CALCULATED.3IONS-SCNC: 11 MMOL/L (ref 5–15)
BASOPHILS # BLD AUTO: 0.05 10*3/MM3 (ref 0–0.2)
BASOPHILS NFR BLD AUTO: 0.6 % (ref 0–1.5)
BUN SERPL-MCNC: 34 MG/DL (ref 8–23)
BUN/CREAT SERPL: 22.2 (ref 7–25)
CALCIUM SPEC-SCNC: 8.5 MG/DL (ref 8.6–10.5)
CHLORIDE SERPL-SCNC: 103 MMOL/L (ref 98–107)
CO2 SERPL-SCNC: 26 MMOL/L (ref 22–29)
CREAT SERPL-MCNC: 1.53 MG/DL (ref 0.57–1)
DEPRECATED RDW RBC AUTO: 41.5 FL (ref 37–54)
EGFRCR SERPLBLD CKD-EPI 2021: 34.5 ML/MIN/1.73
EOSINOPHIL # BLD AUTO: 0.28 10*3/MM3 (ref 0–0.4)
EOSINOPHIL NFR BLD AUTO: 3.5 % (ref 0.3–6.2)
ERYTHROCYTE [DISTWIDTH] IN BLOOD BY AUTOMATED COUNT: 12.8 % (ref 12.3–15.4)
GLUCOSE SERPL-MCNC: 92 MG/DL (ref 65–99)
HCT VFR BLD AUTO: 38.2 % (ref 34–46.6)
HGB BLD-MCNC: 12.7 G/DL (ref 12–15.9)
IMM GRANULOCYTES # BLD AUTO: 0.02 10*3/MM3 (ref 0–0.05)
IMM GRANULOCYTES NFR BLD AUTO: 0.3 % (ref 0–0.5)
LYMPHOCYTES # BLD AUTO: 1.94 10*3/MM3 (ref 0.7–3.1)
LYMPHOCYTES NFR BLD AUTO: 24.3 % (ref 19.6–45.3)
MCH RBC QN AUTO: 30.2 PG (ref 26.6–33)
MCHC RBC AUTO-ENTMCNC: 33.2 G/DL (ref 31.5–35.7)
MCV RBC AUTO: 90.7 FL (ref 79–97)
MONOCYTES # BLD AUTO: 0.83 10*3/MM3 (ref 0.1–0.9)
MONOCYTES NFR BLD AUTO: 10.4 % (ref 5–12)
NEUTROPHILS NFR BLD AUTO: 4.87 10*3/MM3 (ref 1.7–7)
NEUTROPHILS NFR BLD AUTO: 60.9 % (ref 42.7–76)
NRBC BLD AUTO-RTO: 0 /100 WBC (ref 0–0.2)
PLATELET # BLD AUTO: 190 10*3/MM3 (ref 140–450)
PMV BLD AUTO: 10.2 FL (ref 6–12)
POTASSIUM SERPL-SCNC: 4.1 MMOL/L (ref 3.5–5.2)
RBC # BLD AUTO: 4.21 10*6/MM3 (ref 3.77–5.28)
SODIUM SERPL-SCNC: 140 MMOL/L (ref 136–145)
WBC NRBC COR # BLD: 7.99 10*3/MM3 (ref 3.4–10.8)

## 2023-08-30 PROCEDURE — G0378 HOSPITAL OBSERVATION PER HR: HCPCS

## 2023-08-30 PROCEDURE — 85025 COMPLETE CBC W/AUTO DIFF WBC: CPT | Performed by: INTERNAL MEDICINE

## 2023-08-30 PROCEDURE — 80048 BASIC METABOLIC PNL TOTAL CA: CPT | Performed by: INTERNAL MEDICINE

## 2023-08-30 RX ADMIN — METOPROLOL SUCCINATE 100 MG: 100 TABLET, EXTENDED RELEASE ORAL at 08:47

## 2023-08-30 RX ADMIN — POTASSIUM CHLORIDE 20 MEQ: 750 TABLET, EXTENDED RELEASE ORAL at 08:47

## 2023-08-30 RX ADMIN — EMPAGLIFLOZIN 10 MG: 10 TABLET, FILM COATED ORAL at 08:47

## 2023-08-30 RX ADMIN — TORSEMIDE 60 MG: 20 TABLET ORAL at 08:48

## 2023-08-30 NOTE — CASE MANAGEMENT/SOCIAL WORK
Case Management Discharge Note      Final Note: Home. Denies any needs. Family transport    Provided Post Acute Provider List?: Refused  Refused Provider List Comment: Denies any needs    Selected Continued Care - Discharged on 8/30/2023 Admission date: 8/28/2023 - Discharge disposition: Home or Self Care      Destination    No services have been selected for the patient.                Durable Medical Equipment    No services have been selected for the patient.                Dialysis/Infusion    No services have been selected for the patient.                Home Medical Care    No services have been selected for the patient.                Therapy    No services have been selected for the patient.                Community Resources    No services have been selected for the patient.                Community & DME    No services have been selected for the patient.                    Transportation Services  Private: Car    Final Discharge Disposition Code: 01 - home or self-care

## 2023-08-30 NOTE — PROGRESS NOTES
Electrophysiology Follow-Up Note      Patient Name: Unique Talavera  Age/Sex: 78 y.o. female  : 1945  MRN: [id number]      Day of Service: 23       Chief Complaint/Follow-up: HFrEF, Perm afib    S: No complaints, she was up getting weighed      Temp:  [97.5 °F (36.4 °C)-98.1 °F (36.7 °C)] 97.7 °F (36.5 °C)  Heart Rate:  [72-89] 80  Resp:  [18-20] 18  BP: ()/(53-75) 98/69     PHYSICAL EXAM:    General Appearance: No acute distress, morbidly obese  Eyes: Conjunctiva and lids: No erythema, swelling, or discharge. Sclera non-icteric.   HENT: Atraumatic, normocephalic. External eyes, ears, and nose normal.   Respiratory: No signs of respiratory distress. Respiration rhythm and depth normal.   Clear to auscultation. No rales, crackles, rhonchi, or wheezing auscultated.   Cardiovascular:  Heart Rate and Rhythm: Irregularly, irregular. Heart Sounds: S1 and S2.  Lower Extremities: No edema noted.  Gastrointestinal:  Abdomen obese  Musculoskeletal: Normal movement of extremities  Skin: Warm and dry.   Psychiatric: Patient alert and oriented to person, place, and time. Speech and behavior appropriate. Normal mood and affect.       ECG/TELE:           Results from last 7 days   Lab Units 23  0535 23  0436 23  0901   SODIUM mmol/L 140 141 141   POTASSIUM mmol/L 4.1 3.9 4.7   CHLORIDE mmol/L 103 104 104   CO2 mmol/L 26.0 26.2 24.5   BUN mg/dL 34* 29* 32*   CREATININE mg/dL 1.53* 1.42* 1.59*   GLUCOSE mg/dL 92 94 100*   CALCIUM mg/dL 8.5* 8.2* 8.8     Results from last 7 days   Lab Units 23  0535 23  0436 23  0901   WBC 10*3/mm3 7.99 8.26 9.21   HEMOGLOBIN g/dL 12.7 12.5 14.1   HEMATOCRIT % 38.2 37.8 43.2   PLATELETS 10*3/mm3 190 188 227         Results from last 7 days   Lab Units 23  1623 23  1408 23  0901   HSTROP T ng/L 13* 12* 18*               Current Medications:   Scheduled Meds:empagliflozin, 10 mg, Oral, Daily  metoprolol succinate XL, 100  mg, Oral, BID  potassium chloride, 20 mEq, Oral, BID  rivaroxaban, 15 mg, Oral, Daily With Dinner  torsemide, 60 mg, Oral, BID            Acute on chronic systolic CHF (congestive heart failure)    Persistent atrial fibrillation    Morbid obesity    NICM (nonischemic cardiomyopathy)    Essential hypertension    Pulmonary hypertension    GAVIOTA (acute kidney injury)       Plan:    --Acute on chronic HFrEF last echo 2017 EF 37%, she has refused repeat echo, she responded fairly well to a few IV doses of diuretics, transitioned to a little bit higher dose or oral diuretics yesterday evening.     Creat 1.5 today---I think she is okay, lungs are clear and LE edema resolved, on room air.     Recorded weight this morning inaccurate, I was in there when she was being weight, she was 288 lbs.    --Perm afib, HR well controlled on metoprolol rivaroxaban for AC    --Morbid obesity, complicating all aspects of care    --DNR/DNI    Talked with her about the importance of follow up---I think she can go home on a little bit higher dose of torsemide for now--60 mg BID and follow up with [facility] early next week---I will send a message to them to schedule appt, I worry that she will cancel because she says it may be better to wait a few weeks. I think she needs a BMP at the least early next week--I will order that but again not sure she will comply.     Told her we could watch her another day to be sure volume status and labs were stable, she said no she would like to go home. Will discuss with primary team.     Nazanin Paige, SADAF  08/30/23  07:35 EDT

## 2023-08-30 NOTE — PLAN OF CARE
Goal Outcome Evaluation:  Plan of Care Reviewed With: patient        Progress: improving  Outcome Evaluation: Vitals stable. Patient refused night time o2 re: irritation in sinuses - CTU updated and spo2 monitoring on hold. Ambulated to bathroom with standby assistance. Purewick in place. No complaints of pain or SOA. Possible D/C home today

## 2023-08-30 NOTE — DISCHARGE INSTRUCTIONS
Stop taking ibuprofen when on xarelto. It greatly increases change of anemia and abnormal bleeding  FU with cardiology next week or as instructed.

## 2023-08-30 NOTE — DISCHARGE SUMMARY
Patient Name: Unique Talavera  : 1945  MRN: [id number]    Date of Admission: 2023  Date of Discharge:  2023  Primary Care Physician: Elvis Diaz MD      Chief Complaint:   Shortness of Breath      Discharge Diagnoses     Active Hospital Problems    Diagnosis  POA    **Acute on chronic systolic CHF (congestive heart failure) [I50.23]  Yes    Pulmonary hypertension [I27.20]  Yes    Essential hypertension [I10]  Yes    NICM (nonischemic cardiomyopathy) [I42.8]  Yes    Morbid obesity [E66.01]  Yes    Persistent atrial fibrillation [I48.19]  Yes      Resolved Hospital Problems    Diagnosis Date Resolved POA    GAVIOTA (acute kidney injury) [N17.9] 2023 Yes        Hospital Course     Ms. Talavera is a 78 y.o. female with a history of acute on chronic heart failure, permanent atrial fibrillation, severe morbid obesity, nonischemic cardiomyopathy, HTN, pulmonary hypertension, DNR/DNI that presents with acute on chronic systolic CHF with  volume overload.  She was initiated on IV diuretics increased from her home dose.  Cardiology was consulted for assistance with management.  She has heart failure with with preserved EF, last echocardiogram  with EF of 37%.  Patient refused repeat echocardiogram this admission.  Her renal function is elevated this admission compared to baseline over a year ago of 1.0 and has ranged 1.4-1.5 since admission which is possibly a new baseline. She has diuresed well on IV Lasix with improvement in lower extremity edema, stable on room air with lungs clear.  She will be discharged on an increased dose of torsemide 60 mg twice daily from her prior 40 mg twice daily. Patient has declined nephrology consult and wants to go home. Defer referral to nephrology to cardiologist based on outpatient labs. Her Xarelto has been renally dosed.  Cardiology is aware of renal function and plans to follow-up with repeat labs in the outpatient setting next week.  Her atrial  fibrillation and other chronic medical issues have remained stable this admission. She has been cleared by cardiology for discharge with close follow up in the office next week.            Day of Discharge     Subjective:   Doing well. Stable on rooom air. BLE edema improved. No CP SOB LUTS    Physical Exam:  Temp:  [97.5 °F (36.4 °C)-98.1 °F (36.7 °C)] 97.7 °F (36.5 °C)  Heart Rate:  [67-89] 81  Resp:  [17-20] 17  BP: ()/(44-71) 123/71  Body mass index is 48.04 kg/m².  Physical Exam  Vitals and nursing note reviewed.   Constitutional:       Appearance: Normal appearance. She is well-developed. She is obese.   HENT:      Head: Normocephalic and atraumatic.   Eyes:      Pupils: Pupils are equal, round, and reactive to light.   Cardiovascular:      Rate and Rhythm: Normal rate and regular rhythm.      Heart sounds: Normal heart sounds.   Pulmonary:      Effort: Pulmonary effort is normal.      Breath sounds: Normal breath sounds.   Abdominal:      General: Bowel sounds are normal. There is no distension or abdominal bruit.      Palpations: Abdomen is soft. Abdomen is not rigid. There is no shifting dullness, fluid wave or pulsatile mass.      Tenderness: There is no abdominal tenderness. There is no guarding.   Musculoskeletal:         General: Normal range of motion.      Right lower leg: No edema.      Left lower leg: No edema.   Skin:     General: Skin is warm and dry.   Neurological:      Mental Status: She is alert and oriented to person, place, and time.   Psychiatric:         Mood and Affect: Mood normal.         Behavior: Behavior normal.         Thought Content: Thought content normal.       Consultants     Consult Orders (all) (From admission, onward)       Start     Ordered    08/28/23 1306  Inpatient Case Management  Consult  Once        Provider:  (Not yet assigned)    08/28/23 1306    08/28/23 1126  Inpatient Cardiology Consult  Once        Specialty:  Cardiology  Provider:   Addison Sharma MD    08/28/23 1130    08/28/23 0953  [facility] (on-call MD unless specified) Details  Once        Specialty:  Hospitalist  Provider:  (Not yet assigned)    08/28/23 0952                  Procedures     * Surgery not found *    Imaging Results (All)       Procedure Component Value Units Date/Time    XR Chest 1 View [[id number]] Collected: 08/28/23 0925     Updated: 08/28/23 0931    Narrative:      XR CHEST 1 VW-     HISTORY: Female who is 78 years-old, short of breath     TECHNIQUE: Frontal view of the chest     COMPARISON: 2/14/2022     FINDINGS: The heart is enlarged with mild pulmonary vascular congestion.  Minimal likely atelectasis in the left lower lung. No pleural effusion,  or pneumothorax. No acute osseous process.       Impression:      As described.     This report was finalized on 8/28/2023 9:28 AM by Dr. Yoni Rizo M.D.               Results for orders placed during the hospital encounter of 08/04/17    Adult Transthoracic Echo Complete With Contrast    Interpretation Summary  · Left ventricular systolic function is moderately decreased. Calculated EF = 36.9%. Estimated EF was in agreement with the calculated EF. There is left ventricular global hypokinesis noted. The left ventricular cavity is moderately dilated. Left ventricular diastolic was unable to be assessed.  · Right ventricular cavity is mildly dilated. Mildly reduced right ventricular systolic function noted.  · Left atrial cavity size is moderately dilated.  · Estimated right ventricular systolic pressure from tricuspid regurgitation is mildly elevated (35-45 mmHg). Calculated right ventricular systolic pressure from tricuspid regurgitation is 37 mmHg.    Pertinent Labs     Results from last 7 days   Lab Units 08/30/23  0535 08/29/23  0436 08/28/23  0901   WBC 10*3/mm3 7.99 8.26 9.21   HEMOGLOBIN g/dL 12.7 12.5 14.1   PLATELETS 10*3/mm3 190 188 227     Results from last 7 days   Lab Units 08/30/23  0535 08/29/23 0436  08/28/23  0901   SODIUM mmol/L 140 141 141   POTASSIUM mmol/L 4.1 3.9 4.7   CHLORIDE mmol/L 103 104 104   CO2 mmol/L 26.0 26.2 24.5   BUN mg/dL 34* 29* 32*   CREATININE mg/dL 1.53* 1.42* 1.59*   GLUCOSE mg/dL 92 94 100*   EGFR mL/min/1.73 34.5* 37.7* 32.9*     Results from last 7 days   Lab Units 08/28/23  0901   ALBUMIN g/dL 4.0   BILIRUBIN mg/dL 0.3   ALK PHOS U/L 133*   AST (SGOT) U/L 20   ALT (SGPT) U/L 11     Results from last 7 days   Lab Units 08/30/23  0535 08/29/23  0436 08/28/23  0901   CALCIUM mg/dL 8.5* 8.2* 8.8   ALBUMIN g/dL  --   --  4.0       Results from last 7 days   Lab Units 08/28/23  1623 08/28/23  1408 08/28/23  0901   HSTROP T ng/L 13* 12* 18*   PROBNP pg/mL  --   --  2,924.0*     Results from last 7 days   Lab Units 08/29/23  0436   URIC ACID mg/dL 9.3*         Invalid input(s): LDLCALC          Test Results Pending at Discharge       Discharge Details        Discharge Medications        Changes to Medications        Instructions Start Date   rivaroxaban 20 MG tablet  Commonly known as: Xarelto  What changed: Another medication with the same name was added. Make sure you understand how and when to take each.   20 mg, Oral, Daily With Dinner      Xarelto 15 MG tablet  Generic drug: rivaroxaban  What changed: You were already taking a medication with the same name, and this prescription was added. Make sure you understand how and when to take each.   15 mg, Oral, Daily With Dinner      Torsemide 60 MG tablet  What changed:   medication strength  how much to take  when to take this   60 mg, Oral, 2 Times Daily             Continue These Medications        Instructions Start Date   empagliflozin 10 MG tablet tablet  Commonly known as: Jardiance   10 mg, Oral, Daily      metoprolol succinate  MG 24 hr tablet  Commonly known as: TOPROL-XL   100 mg, Oral, 2 Times Daily      potassium chloride ER 20 MEQ tablet controlled-release ER tablet  Commonly known as: K-TAB   TAKE 1 TABLET BY MOUTH TWICE  A DAY      PRESERVISION AREDS 2 PO   1 dose, Oral, 2 Times Daily             Stop These Medications      ibuprofen 200 MG tablet  Commonly known as: ADVIL,MOTRIN              No Known Allergies    Discharge Disposition:  Home or Self Care      Discharge Diet:  Diet Order   Procedures    Diet: Cardiac Diets; Healthy Heart (2-3 Na+); Texture: Regular Texture (IDDSI 7); Fluid Consistency: Thin (IDDSI 0)       Discharge Activity:       CODE STATUS:    Code Status and Medical Interventions:   Ordered at: 08/29/23 1100     Medical Intervention Limits:    NO intubation (DNI)    NO cardioversion    NO artificial nutrition    NO dialysis     Code Status (Patient has no pulse and is not breathing):    No CPR (Do Not Attempt to Resuscitate)     Medical Interventions (Patient has pulse or is breathing):    Limited Support       Future Appointments   Date Time Provider Department Center   1/17/2024  9:00 AM Nazanin Piage APRN [facility]     Additional Instructions for the Follow-ups that You Need to Schedule       Basic Metabolic Panel    Sep 05, 2023 (Approximate)      Release to patient: Routine Release               Follow-up Information       Karla Silverio APRN Follow up on 9/5/2023.    Specialties: Cardiology, Nurse Practitioner  Why: Tuesday if possible in the [facility] Failure Clinic  Contact information:  [address] [facility] [address]  [facility] [id number] [phone]               Elvis Diaz MD .    Specialty: Family Medicine  Contact information:  [address]  [facility] [address] [phone]                             Additional Instructions for the Follow-ups that You Need to Schedule       Basic Metabolic Panel    Sep 05, 2023 (Approximate)      Release to patient: Routine Release            Time Spent on Discharge:  Greater than 55 minutes      SADAF Zhang  [facility]  08/30/23  10:34 EDT

## 2023-08-31 ENCOUNTER — READMISSION MANAGEMENT (OUTPATIENT)
Dept: CALL CENTER | Facility: HOSPITAL | Age: 78
End: 2023-08-31
Payer: MEDICARE

## 2023-08-31 ENCOUNTER — TELEPHONE (OUTPATIENT)
Dept: CARDIOLOGY | Facility: HOSPITAL | Age: 78
End: 2023-08-31
Payer: MEDICARE

## 2023-08-31 NOTE — TELEPHONE ENCOUNTER
Called patient this morning to schedule hospital F/U W/ Dr. Sorensen, per message from Nazanin Paige.   Spoke with patient. She stated she will need to call back after talking with her son, as he brings her to her appointments.    Thank you,  Rica LEAL [facility]

## 2023-08-31 NOTE — OUTREACH NOTE
Prep Survey      Flowsheet Row Responses   [facility] facility patient discharged from? Only   Is LACE score < 7 ? No   Eligibility Readm Mgmt   Discharge diagnosis Acute on chronic systolic CHF (congestive heart failure)   Does the patient have one of the following disease processes/diagnoses(primary or secondary)? CHF   Medication alerts for this patient xarelto, torsemide   Prep survey completed? Yes            Christina GEORGE - Registered Nurse

## 2023-08-31 NOTE — TELEPHONE ENCOUNTER
Patient returned call. Appointment scheduled to see Dr. Sorensen on Sept 13, 2023 at 9:30 am.    Thank you,  Rica LEAL C

## 2023-09-05 ENCOUNTER — READMISSION MANAGEMENT (OUTPATIENT)
Dept: CALL CENTER | Facility: HOSPITAL | Age: 78
End: 2023-09-05
Payer: MEDICARE

## 2023-09-05 NOTE — OUTREACH NOTE
CHF Week 1 Survey      Flowsheet Row Responses   [facility] patient discharged from? [facility]   Does the patient have one of the following disease processes/diagnoses(primary or secondary)? CHF   CHF Week 1 attempt successful? Yes   Call start time 1055   Call end time 1057   Discharge diagnosis Acute on chronic systolic CHF (congestive heart failure)   Medication alerts for this patient xarelto, torsemide   Meds reviewed with patient/caregiver? Yes   Does the patient have all medications ordered at discharge? N/A   Is the patient taking all medications as directed (includes completed medication regime)? Yes   Does the patient have a primary care provider?  Yes   Comments regarding PCP Patient states she has all appointments scheduled.   Has the patient kept scheduled appointments due by today? N/A   Has home health visited the patient within 72 hours of discharge? N/A   Pulse Ox monitoring None   Psychosocial issues? No   Did the patient receive a copy of their discharge instructions? Yes   Nursing interventions Reviewed instructions with patient   What is the patient's perception of their health status since discharge? Improving   Nursing interventions Nurse provided patient education   Is the patient able to teach back signs and symptoms of worsening condition? (i.e. weight gain, shortness of air, etc.) Yes   If the patient is a current smoker, are they able to teach back resources for cessation? Not a smoker   Is the patient/caregiver able to teach back the hierarchy of who to call/visit for symptoms/problems? PCP, Specialist, Home health nurse, Urgent Care, ED, 911 Yes   Is the patient able to teach back Heart Failure Zones? Yes   CHF Nursing Interventions Education provided on various zones   CHF Zone this Call Green Zone   Green Zone Patient reports doing well, No new or worsening shortness of breath, Physical activity level is normal for you, No new swelling -  feet, ankles and legs look normal for  you, Weight check stable, No chest pain   Green Zone Interventions Daily weight check, Meds as directed, Follow up visits planned, Low sodium diet    CHF Week 1 call completed? Yes   Call end time [phone]            Mandy CORTEZ - Licensed Nurse

## 2023-09-13 ENCOUNTER — HOSPITAL ENCOUNTER (OUTPATIENT)
Dept: CARDIOLOGY | Facility: HOSPITAL | Age: 78
Discharge: HOME | End: 2023-09-13
Payer: MEDICARE

## 2023-09-13 ENCOUNTER — TELEPHONE (OUTPATIENT)
Dept: CARDIOLOGY | Facility: HOSPITAL | Age: 78
End: 2023-09-13
Payer: MEDICARE

## 2023-09-13 ENCOUNTER — LAB (OUTPATIENT)
Dept: LAB | Facility: HOSPITAL | Age: 78
End: 2023-09-13
Payer: MEDICARE

## 2023-09-13 VITALS
WEIGHT: 289.4 LBS | DIASTOLIC BLOOD PRESSURE: 53 MMHG | HEART RATE: 75 BPM | BODY MASS INDEX: 48.22 KG/M2 | OXYGEN SATURATION: 96 % | SYSTOLIC BLOOD PRESSURE: 110 MMHG | HEIGHT: 65 IN

## 2023-09-13 DIAGNOSIS — Z66 DNR (DO NOT RESUSCITATE): ICD-10-CM

## 2023-09-13 DIAGNOSIS — Z91.89 AT RISK FOR SLEEP APNEA: ICD-10-CM

## 2023-09-13 DIAGNOSIS — I27.20 PULMONARY HYPERTENSION: Primary | ICD-10-CM

## 2023-09-13 DIAGNOSIS — I50.22 CHRONIC HFREF (HEART FAILURE WITH REDUCED EJECTION FRACTION): ICD-10-CM

## 2023-09-13 LAB
ANION GAP SERPL CALCULATED.3IONS-SCNC: 11 MMOL/L (ref 5–15)
BUN SERPL-MCNC: 34 MG/DL (ref 8–23)
BUN/CREAT SERPL: 23.3 (ref 7–25)
CALCIUM SPEC-SCNC: 9.3 MG/DL (ref 8.6–10.5)
CHLORIDE SERPL-SCNC: 103 MMOL/L (ref 98–107)
CO2 SERPL-SCNC: 27 MMOL/L (ref 22–29)
CREAT SERPL-MCNC: 1.46 MG/DL (ref 0.57–1)
EGFRCR SERPLBLD CKD-EPI 2021: 36.5 ML/MIN/1.73
GLUCOSE SERPL-MCNC: 86 MG/DL (ref 65–99)
NT-PROBNP SERPL-MCNC: 3415 PG/ML (ref 0–1800)
POTASSIUM SERPL-SCNC: 3.9 MMOL/L (ref 3.5–5.2)
SODIUM SERPL-SCNC: 141 MMOL/L (ref 136–145)

## 2023-09-13 PROCEDURE — 83880 ASSAY OF NATRIURETIC PEPTIDE: CPT | Performed by: INTERNAL MEDICINE

## 2023-09-13 PROCEDURE — 80048 BASIC METABOLIC PNL TOTAL CA: CPT | Performed by: INTERNAL MEDICINE

## 2023-09-13 PROCEDURE — 36415 COLL VENOUS BLD VENIPUNCTURE: CPT | Performed by: INTERNAL MEDICINE

## 2023-09-13 PROCEDURE — G0463 HOSPITAL OUTPT CLINIC VISIT: HCPCS

## 2023-09-13 RX ORDER — POTASSIUM CHLORIDE 1500 MG/1
20 TABLET, EXTENDED RELEASE ORAL 2 TIMES DAILY
COMMUNITY
End: 2023-09-13 | Stop reason: SDUPTHER

## 2023-09-13 RX ORDER — POTASSIUM CHLORIDE 1500 MG/1
20 TABLET, EXTENDED RELEASE ORAL 2 TIMES DAILY
Qty: 60 TABLET | Refills: 0 | Status: SHIPPED | OUTPATIENT
Start: 2023-09-13

## 2023-09-13 RX ORDER — TORSEMIDE 20 MG/1
60 TABLET ORAL 2 TIMES DAILY
Qty: 180 TABLET | Refills: 0 | Status: SHIPPED | OUTPATIENT
Start: 2023-09-13

## 2023-09-13 NOTE — TELEPHONE ENCOUNTER
Patient called today after her appointment. She reports the pharmacy does not have her new increased dose of Torsemide that was adjusted at today's appointment. She is also needing her Potassium that was supposed to be sent in as well.    Thank you,  Rica LEAL [facility]

## 2023-09-13 NOTE — PROGRESS NOTES
Heart Failure & Pulmonary Arterial Hypertension Clinic  [facility]  Michael Sorensen M.D., Ph.D., [facility]       Lloyd Martines MD  [address] [address]    Thank you for asking me to see Unique Talavera for congestive heart failure.    History of Present Illness  This is a 78 y.o. female with:    1. PH and NICM    Unique Talavera is a 78 y.o. female who presents for today for CHF.  The patient is typically seen by Elvis Diaz MD.  Patient's primary cardiologist is Dr. Martines.     The patient presents for a longstanding NICM.  She was diagnosed around 2016 with severe left ventricular failure.  LVEF was around 15%.  Patient coronary angiography revealed no evidence of epicardial disease.  Her LVEF has improved to the 30s.  This has been complicated by pulmonary hypertension and right ventricular dysfunction.  She is currently prescribed Toprol-XL dosed at 100 mg 1 tablet p.o. twice daily and torsemide 40 mg every 12 hours.    The patient return to clinic today.  She has been lost to follow-up.  Unfortunately, she has had an admission for ADHF.  She underwent IV diuresis.  During that admission she refused to update her 2D TTE.  She is on Jardiance 10 mg, Toprol- mg 1 tablet p.o. twice daily, and torsemide dosed at 60 mg 1 tablet by mouth p.o. twice daily.  She reports stable exercise intolerance        Review of Systems - Review of Systems   Cardiovascular:  Positive for dyspnea on exertion and leg swelling. Negative for chest pain, claudication, cyanosis, irregular heartbeat, near-syncope, orthopnea, palpitations, paroxysmal nocturnal dyspnea and syncope.   Respiratory:  Positive for cough, shortness of breath and snoring. Negative for hemoptysis, sleep disturbances due to breathing, sputum production and wheezing.    All other systems reviewed and are negative.     All other systems were reviewed and were negative.    Patient Active Problem List   Diagnosis     Persistent atrial fibrillation    Arthritis    Morbid obesity    NICM (nonischemic cardiomyopathy)    Vertigo    Daytime sleepiness    DNR (do not resuscitate)    Essential hypertension    Pulmonary hypertension    Sleep apnea-like behavior    Acute on chronic systolic CHF (congestive heart failure)       family history includes Heart disease in her mother; Stroke in her father.     reports that she quit smoking about 51 years ago. Her smoking use included cigarettes. She has never used smokeless tobacco. She reports that she does not currently use alcohol. She reports that she does not use drugs.    No Known Allergies      Current Outpatient Medications:     empagliflozin (Jardiance) 10 MG tablet tablet, Take 1 tablet by mouth Daily., Disp: 30 tablet, Rfl: 3    metoprolol succinate XL (TOPROL-XL) 100 MG 24 hr tablet, Take 1 tablet by mouth 2 (Two) Times a Day., Disp: 180 tablet, Rfl: 3    Multiple Vitamins-Minerals (PRESERVISION AREDS 2 PO), Take 1 dose by mouth 2 (Two) Times a Day., Disp: , Rfl:     potassium chloride ER (K-TAB) 20 MEQ tablet controlled-release ER tablet, Take 1 tablet by mouth 2 (Two) Times a Day., Disp: , Rfl:     rivaroxaban (XARELTO) 15 MG tablet, Take 1 tablet by mouth Daily With Dinner. Indications: Atrial Fibrillation, Disp: 42 tablet, Rfl: 0    rivaroxaban (Xarelto) 20 MG tablet, Take 1 tablet by mouth Daily With Dinner., Disp: 28 tablet, Rfl: 0    torsemide 60 MG tablet, Take 60 mg by mouth 2 (Two) Times a Day., Disp: , Rfl:       Physical Exam:  I have reviewed the patient's current vital signs as documented in the patient's EMR.   Vitals:    09/13/23 0947   BP: 110/53   Pulse: 75   SpO2: 96%     Body mass index is 48.16 kg/m².       09/13/23 0947   Weight: 131 kg (289 lb 6.4 oz)        Physical Exam  Vitals reviewed.   Constitutional:       General: She is not in acute distress.     Appearance: She is obese. She is not ill-appearing, toxic-appearing or diaphoretic.      Interventions:  She is not intubated.  HENT:      Head: Normocephalic and atraumatic.   Neck:      Vascular: Hepatojugular reflux present. No JVD.      Comments: JVP >10 cm of water with mild HJR.  Cardiovascular:      Rate and Rhythm: Normal rate and regular rhythm.      Heart sounds: Normal heart sounds, S1 normal and S2 normal. No murmur heard.  No systolic murmur is present.   No diastolic murmur is present.     No friction rub. No gallop. No S3 or S4 sounds.   Pulmonary:      Effort: Pulmonary effort is normal. No tachypnea, bradypnea, accessory muscle usage, prolonged expiration, respiratory distress or retractions. She is not intubated.      Breath sounds: Normal breath sounds and air entry. No stridor, decreased air movement or transmitted upper airway sounds. No decreased breath sounds, wheezing, rhonchi or rales.   Skin:     General: Skin is warm and dry.   Neurological:      General: No focal deficit present.      Mental Status: She is alert. Mental status is at baseline.   Psychiatric:         Mood and Affect: Mood normal.         Speech: Speech normal.         Behavior: Behavior normal. Behavior is cooperative.         Thought Content: Thought content normal.         Cognition and Memory: Cognition and memory normal.         Judgment: Judgment normal.         DATA REVIEWED:     ---------------------------------------------------  TTE/TOMASA:  Results for orders placed during the hospital encounter of 08/04/17    Adult Transthoracic Echo Complete With Contrast    Interpretation Summary  · Left ventricular systolic function is moderately decreased. Calculated EF = 36.9%. Estimated EF was in agreement with the calculated EF. There is left ventricular global hypokinesis noted. The left ventricular cavity is moderately dilated. Left ventricular diastolic was unable to be assessed.  · Right ventricular cavity is mildly dilated. Mildly reduced right ventricular systolic function noted.  · Left atrial cavity size is moderately  dilated.  · Estimated right ventricular systolic pressure from tricuspid regurgitation is mildly elevated (35-45 mmHg). Calculated right ventricular systolic pressure from tricuspid regurgitation is 37 mmHg.      My interpretation of the TTE is below.     LVEF is 37%.  LV cavity is dilated.  RV cavity is dilated with reduced systolic function.    -----------------------------------------------------      Laboratory evaluations:    Lab Results   Component Value Date    GLUCOSE 92 08/30/2023    BUN 34 (H) 08/30/2023    CREATININE 1.53 (H) 08/30/2023    EGFRIFNONA 56 (L) 02/19/2022    BCR 22.2 08/30/2023    K 4.1 08/30/2023    CO2 26.0 08/30/2023    CALCIUM 8.5 (L) 08/30/2023    ALBUMIN 4.0 08/28/2023    AST 20 08/28/2023    ALT 11 08/28/2023     Lab Results   Component Value Date    WBC 7.99 08/30/2023    HGB 12.7 08/30/2023    HCT 38.2 08/30/2023    MCV 90.7 08/30/2023     08/30/2023     Lab Results   Component Value Date    CHOL 105 07/21/2016    TRIG 63 07/21/2016    HDL 42 07/21/2016    LDL 50 07/21/2016     Lab Results   Component Value Date    TSH 5.520 (H) 04/11/2016     Lab Results   Component Value Date    TROPONINT 13 (H) 08/28/2023     Lab Results   Component Value Date    HGBA1C 5.70 (H) 12/28/2022     No results found for: DDIMER  Lab Results   Component Value Date    ALT 11 08/28/2023     Lab Results   Component Value Date    HGBA1C 5.70 (H) 12/28/2022    HGBA1C 5.80 (H) 07/21/2016     Lab Results   Component Value Date    CREATININE 1.53 (H) 08/30/2023     No results found for: IRON, TIBC, FERRITIN  Lab Results   Component Value Date    INR 1.12 (H) 04/11/2016    PROTIME 14.2 04/11/2016     Assessment & Plan       PAH RISK ASSESSMENT:        1. Pulmonary hypertension    2. Chronic HFrEF (heart failure with reduced ejection fraction)    3. At risk for sleep apnea    4. DNR (do not resuscitate)        1.  She presents today with likely WHO-group-2/3 secondary pulmonary hypertension.  This is likely  from her  NICM, restrictive lung physiology from obesity, and likely JASON.  Unfortunately, she has developed right ventricular involvement.  Her last 2D TTE in 2017 is the last echocardiogram that we have on file.  She has refused to update her echocardiogram.  She has refused to see sleep medicine.  For now, I have encouraged her to continue optimization of her HFrEF regimen.     2.  NYHA stage C; functional class III.  Today, she remains hypervolemic, but perfused.  Her main goal is palliative therapy and to avoid hospital admissions.  She will continue on Toprol-XL, torsemide, and Jardiance.  She does not want to update her 2D TTE.  She is not interested in the addition of other medications.  She also was opposed to checking repeat labs.  I discussed with her again the need to update her 2D TTE to evaluate her biventricular function.  I have recommended that we consider outpatient palliative consult. She declined. I asked her to get labs today. She is agreeable to this. I asked her to RTC in one week, but she declined. She has asked for a 4 month follow-up.     3.  Encouraged outpatient sleep referral.  She declined.    4.  Noted today.        ORDERS PLACED TODAY:  Orders Placed This Encounter   Procedures    Basic Metabolic Panel    proBNP          MEDS ORDERED TODAY:    No orders of the defined types were placed in this encounter.         Return in about 4 months (around 1/13/2024).          This document has been electronically signed by Michael Sorensen MD PhD on September 13, 2023 10:09 EDT      Michael Sorensen M.D., Ph.D., [facility] Heart Failure and PAH [facility]  System Medical Director for HF and PAH

## 2023-09-13 NOTE — TELEPHONE ENCOUNTER
----- Message from Michael Sorensen MD PhD sent at 9/13/2023 11:36 AM EDT -----  Kidney function has improved.

## 2023-09-13 NOTE — LETTER
September 13, 2023       No Recipients    Patient: Unique Talavera   YOB: 1945   Date of Visit: 9/13/2023     Dear Elvis Diaz MD:       Thank you for referring Unique Talavera to me for evaluation. Below are the relevant portions of my assessment and plan of care.    If you have questions, please do not hesitate to call me. I look forward to following Unique along with you.         Sincerely,        Michael Sorensen MD PhD        CC:   No Recipients    Michael Sorensen MD PhD  09/13/23 1009  Signed  Heart Failure & Pulmonary Arterial Hypertension Clinic  [facility]  Michael Sorensen M.D., Ph.D., [facility]       Lloyd Martines MD  [address] [id number]    Thank you for asking me to see Unique Talavera for congestive heart failure.    History of Present Illness  This is a 78 y.o. female with:    1. PH and NICM    Unique Talavera is a 78 y.o. female who presents for today for CHF.  The patient is typically seen by Elvis Diaz MD.  Patient's primary cardiologist is Dr. Martines.     The patient presents for a longstanding NICM.  She was diagnosed around 2016 with severe left ventricular failure.  LVEF was around 15%.  Patient coronary angiography revealed no evidence of epicardial disease.  Her LVEF has improved to the 30s.  This has been complicated by pulmonary hypertension and right ventricular dysfunction.  She is currently prescribed Toprol-XL dosed at 100 mg 1 tablet p.o. twice daily and torsemide 40 mg every 12 hours.    The patient return to clinic today.  She has been lost to follow-up.  Unfortunately, she has had an admission for ADHF.  She underwent IV diuresis.  During that admission she refused to update her 2D TTE.  She is on Jardiance 10 mg, Toprol- mg 1 tablet p.o. twice daily, and torsemide dosed at 60 mg 1 tablet by mouth p.o. twice daily.  She reports stable exercise intolerance        Review of Systems - Review of Systems    Cardiovascular:  Positive for dyspnea on exertion and leg swelling. Negative for chest pain, claudication, cyanosis, irregular heartbeat, near-syncope, orthopnea, palpitations, paroxysmal nocturnal dyspnea and syncope.   Respiratory:  Positive for cough, shortness of breath and snoring. Negative for hemoptysis, sleep disturbances due to breathing, sputum production and wheezing.    All other systems reviewed and are negative.     All other systems were reviewed and were negative.    Patient Active Problem List   Diagnosis   • Persistent atrial fibrillation   • Arthritis   • Morbid obesity   • NICM (nonischemic cardiomyopathy)   • Vertigo   • Daytime sleepiness   • DNR (do not resuscitate)   • Essential hypertension   • Pulmonary hypertension   • Sleep apnea-like behavior   • Acute on chronic systolic CHF (congestive heart failure)       family history includes Heart disease in her mother; Stroke in her father.     reports that she quit smoking about 51 years ago. Her smoking use included cigarettes. She has never used smokeless tobacco. She reports that she does not currently use alcohol. She reports that she does not use drugs.    No Known Allergies      Current Outpatient Medications:   •  empagliflozin (Jardiance) 10 MG tablet tablet, Take 1 tablet by mouth Daily., Disp: 30 tablet, Rfl: 3  •  metoprolol succinate XL (TOPROL-XL) 100 MG 24 hr tablet, Take 1 tablet by mouth 2 (Two) Times a Day., Disp: 180 tablet, Rfl: 3  •  Multiple Vitamins-Minerals (PRESERVISION AREDS 2 PO), Take 1 dose by mouth 2 (Two) Times a Day., Disp: , Rfl:   •  potassium chloride ER (K-TAB) 20 MEQ tablet controlled-release ER tablet, Take 1 tablet by mouth 2 (Two) Times a Day., Disp: , Rfl:   •  rivaroxaban (XARELTO) 15 MG tablet, Take 1 tablet by mouth Daily With Dinner. Indications: Atrial Fibrillation, Disp: 42 tablet, Rfl: 0  •  rivaroxaban (Xarelto) 20 MG tablet, Take 1 tablet by mouth Daily With Dinner., Disp: 28 tablet, Rfl: 0  •   torsemide 60 MG tablet, Take 60 mg by mouth 2 (Two) Times a Day., Disp: , Rfl:       Physical Exam:  I have reviewed the patient's current vital signs as documented in the patient's EMR.   Vitals:    09/13/23 0947   BP: 110/53   Pulse: 75   SpO2: 96%     Body mass index is 48.16 kg/m².       09/13/23  0947   Weight: 131 kg (289 lb 6.4 oz)        Physical Exam  Vitals reviewed.   Constitutional:       General: She is not in acute distress.     Appearance: She is obese. She is not ill-appearing, toxic-appearing or diaphoretic.      Interventions: She is not intubated.  HENT:      Head: Normocephalic and atraumatic.   Neck:      Vascular: Hepatojugular reflux present. No JVD.      Comments: JVP >10 cm of water with mild HJR.  Cardiovascular:      Rate and Rhythm: Normal rate and regular rhythm.      Heart sounds: Normal heart sounds, S1 normal and S2 normal. No murmur heard.  No systolic murmur is present.   No diastolic murmur is present.     No friction rub. No gallop. No S3 or S4 sounds.   Pulmonary:      Effort: Pulmonary effort is normal. No tachypnea, bradypnea, accessory muscle usage, prolonged expiration, respiratory distress or retractions. She is not intubated.      Breath sounds: Normal breath sounds and air entry. No stridor, decreased air movement or transmitted upper airway sounds. No decreased breath sounds, wheezing, rhonchi or rales.   Skin:     General: Skin is warm and dry.   Neurological:      General: No focal deficit present.      Mental Status: She is alert. Mental status is at baseline.   Psychiatric:         Mood and Affect: Mood normal.         Speech: Speech normal.         Behavior: Behavior normal. Behavior is cooperative.         Thought Content: Thought content normal.         Cognition and Memory: Cognition and memory normal.         Judgment: Judgment normal.         DATA REVIEWED:     ---------------------------------------------------  TTE/TOMASA:  Results for orders placed during the  hospital encounter of 08/04/17    Adult Transthoracic Echo Complete With Contrast    Interpretation Summary  · Left ventricular systolic function is moderately decreased. Calculated EF = 36.9%. Estimated EF was in agreement with the calculated EF. There is left ventricular global hypokinesis noted. The left ventricular cavity is moderately dilated. Left ventricular diastolic was unable to be assessed.  · Right ventricular cavity is mildly dilated. Mildly reduced right ventricular systolic function noted.  · Left atrial cavity size is moderately dilated.  · Estimated right ventricular systolic pressure from tricuspid regurgitation is mildly elevated (35-45 mmHg). Calculated right ventricular systolic pressure from tricuspid regurgitation is 37 mmHg.      My interpretation of the TTE is below.     LVEF is 37%.  LV cavity is dilated.  RV cavity is dilated with reduced systolic function.    -----------------------------------------------------      Laboratory evaluations:    Lab Results   Component Value Date    GLUCOSE 92 08/30/2023    BUN 34 (H) 08/30/2023    CREATININE 1.53 (H) 08/30/2023    EGFRIFNONA 56 (L) 02/19/2022    BCR 22.2 08/30/2023    K 4.1 08/30/2023    CO2 26.0 08/30/2023    CALCIUM 8.5 (L) 08/30/2023    ALBUMIN 4.0 08/28/2023    AST 20 08/28/2023    ALT 11 08/28/2023     Lab Results   Component Value Date    WBC 7.99 08/30/2023    HGB 12.7 08/30/2023    HCT 38.2 08/30/2023    MCV 90.7 08/30/2023     08/30/2023     Lab Results   Component Value Date    CHOL 105 07/21/2016    TRIG 63 07/21/2016    HDL 42 07/21/2016    LDL 50 07/21/2016     Lab Results   Component Value Date    TSH 5.520 (H) 04/11/2016     Lab Results   Component Value Date    TROPONINT 13 (H) 08/28/2023     Lab Results   Component Value Date    HGBA1C 5.70 (H) 12/28/2022     No results found for: DDIMER  Lab Results   Component Value Date    ALT 11 08/28/2023     Lab Results   Component Value Date    HGBA1C 5.70 (H) 12/28/2022     HGBA1C 5.80 (H) 07/21/2016     Lab Results   Component Value Date    CREATININE 1.53 (H) 08/30/2023     No results found for: IRON, TIBC, FERRITIN  Lab Results   Component Value Date    INR 1.12 (H) 04/11/2016    PROTIME 14.2 04/11/2016     Assessment & Plan       PAH RISK ASSESSMENT:        1. Pulmonary hypertension    2. Chronic HFrEF (heart failure with reduced ejection fraction)    3. At risk for sleep apnea    4. DNR (do not resuscitate)        1.  She presents today with likely WHO-group-2/3 secondary pulmonary hypertension.  This is likely from her  NICM, restrictive lung physiology from obesity, and likely JASON.  Unfortunately, she has developed right ventricular involvement.  Her last 2D TTE in 2017 is the last echocardiogram that we have on file.  She has refused to update her echocardiogram.  She has refused to see sleep medicine.  For now, I have encouraged her to continue optimization of her HFrEF regimen.     2.  NYHA stage C; functional class III.  Today, she remains hypervolemic, but perfused.  Her main goal is palliative therapy and to avoid hospital admissions.  She will continue on Toprol-XL, torsemide, and Jardiance.  She does not want to update her 2D TTE.  She is not interested in the addition of other medications.  She also was opposed to checking repeat labs.  I discussed with her again the need to update her 2D TTE to evaluate her biventricular function.  I have recommended that we consider outpatient palliative consult. She declined. I asked her to get labs today. She is agreeable to this. I asked her to RTC in one week, but she declined. She has asked for a 4 month follow-up.     3.  Encouraged outpatient sleep referral.  She declined.    4.  Noted today.        ORDERS PLACED TODAY:  Orders Placed This Encounter   Procedures   • Basic Metabolic Panel   • proBNP          MEDS ORDERED TODAY:    No orders of the defined types were placed in this encounter.         Return in about 4 months  (around 1/13/2024).          This document has been electronically signed by Michael Sorensen MD PhD on September 13, 2023 10:09 EDT      Michael Sorensen M.D., Ph.D., [facility] Failure [facility] PAH [facility]  System Medical Director for HF and PAH

## 2023-09-13 NOTE — ADDENDUM NOTE
Encounter addended by: Ailyn Acosta MA on: 9/13/2023 4:38 PM   Actions taken: Charge Capture section accepted

## 2023-10-04 ENCOUNTER — TELEPHONE (OUTPATIENT)
Age: 78
End: 2023-10-04
Payer: MEDICARE

## 2023-10-04 NOTE — TELEPHONE ENCOUNTER
Left voicemail informing patient of new dose for r-ban, and samples here for pickup on the 4th floor.

## 2023-10-04 NOTE — TELEPHONE ENCOUNTER
Estimated Creatinine Clearance: 42.7 mL/min (A) (by C-G formula based on SCr of 1.46 mg/dL (H)).      Yes rivaroxaban 15 mg daily

## 2023-10-04 NOTE — TELEPHONE ENCOUNTER
Patient calling for samples of r-ban.    She used to be on 20mg, but she mentioned when she was in the hospital last her dose was lowered to 15mg.    Is this going to be her new dose for good?    Just want to make sure we give her correct dose in samples.

## 2023-10-10 RX ORDER — TORSEMIDE 20 MG/1
60 TABLET ORAL 2 TIMES DAILY
Qty: 180 TABLET | Refills: 0 | Status: SHIPPED | OUTPATIENT
Start: 2023-10-10

## 2023-10-10 RX ORDER — POTASSIUM CHLORIDE 1500 MG/1
20 TABLET, EXTENDED RELEASE ORAL 2 TIMES DAILY
Qty: 60 TABLET | Refills: 0 | Status: SHIPPED | OUTPATIENT
Start: 2023-10-10

## 2023-11-02 DIAGNOSIS — E66.01 MORBID OBESITY, UNSPECIFIED OBESITY TYPE: ICD-10-CM

## 2023-11-02 DIAGNOSIS — I50.41 ACUTE COMBINED SYSTOLIC AND DIASTOLIC HEART FAILURE: ICD-10-CM

## 2023-11-02 RX ORDER — METOPROLOL SUCCINATE 100 MG/1
100 TABLET, EXTENDED RELEASE ORAL 2 TIMES DAILY
Qty: 180 TABLET | Refills: 3 | Status: SHIPPED | OUTPATIENT
Start: 2023-11-02

## 2023-11-07 RX ORDER — POTASSIUM CHLORIDE 1500 MG/1
20 TABLET, EXTENDED RELEASE ORAL 2 TIMES DAILY
Qty: 60 TABLET | Refills: 0 | Status: SHIPPED | OUTPATIENT
Start: 2023-11-07

## 2023-11-07 RX ORDER — TORSEMIDE 20 MG/1
60 TABLET ORAL 2 TIMES DAILY
Qty: 180 TABLET | Refills: 0 | Status: SHIPPED | OUTPATIENT
Start: 2023-11-07

## 2023-12-04 RX ORDER — POTASSIUM CHLORIDE 1500 MG/1
20 TABLET, EXTENDED RELEASE ORAL 2 TIMES DAILY
Qty: 60 TABLET | Refills: 0 | Status: SHIPPED | OUTPATIENT
Start: 2023-12-04

## 2023-12-04 RX ORDER — TORSEMIDE 20 MG/1
60 TABLET ORAL 2 TIMES DAILY
Qty: 180 TABLET | Refills: 0 | Status: SHIPPED | OUTPATIENT
Start: 2023-12-04

## 2023-12-28 ENCOUNTER — TELEPHONE (OUTPATIENT)
Dept: CARDIOLOGY | Facility: CLINIC | Age: 78
End: 2023-12-28
Payer: MEDICARE

## 2023-12-28 NOTE — TELEPHONE ENCOUNTER
Caller: Unique Talavera    Relationship to patient: Self    Best call back number: [phone]    Patient is needing: PT IS WONDERING IF HER VISIT WITH [facility] NEXT MONTH CAN BE OVER THE PHONE/ TELEHEALTH AS SHE IS HAVING ISSUES WITH TRANSPORTATION. PLEASE FOLLOW UP AND ADVISE.

## 2023-12-29 NOTE — TELEPHONE ENCOUNTER
Please call patient to let her know that we do not do telehealth.    Let her know we can get her rescheduled for a better date/time for her.    Thanks!  Regina

## 2024-01-08 RX ORDER — POTASSIUM CHLORIDE 1500 MG/1
20 TABLET, EXTENDED RELEASE ORAL 2 TIMES DAILY
Qty: 60 TABLET | Refills: 0 | Status: SHIPPED | OUTPATIENT
Start: 2024-01-08

## 2024-01-08 RX ORDER — TORSEMIDE 20 MG/1
60 TABLET ORAL 2 TIMES DAILY
Qty: 180 TABLET | Refills: 0 | Status: SHIPPED | OUTPATIENT
Start: 2024-01-08

## 2024-02-06 RX ORDER — TORSEMIDE 20 MG/1
60 TABLET ORAL 2 TIMES DAILY
Qty: 180 TABLET | Refills: 0 | Status: SHIPPED | OUTPATIENT
Start: 2024-02-06

## 2024-02-06 RX ORDER — POTASSIUM CHLORIDE 1500 MG/1
20 TABLET, EXTENDED RELEASE ORAL 2 TIMES DAILY
Qty: 60 TABLET | Refills: 0 | Status: SHIPPED | OUTPATIENT
Start: 2024-02-06

## 2024-03-11 NOTE — OUTREACH NOTE
Prep Survey      Responses   [facility] facility patient discharged from? [place]   Is LACE score < 7 ? No   Emergency Room discharge w/ pulse ox? No   Eligibility Readm Mgmt   Discharge diagnosis Acute on chronic congestive heart failure    Does the patient have one of the following disease processes/diagnoses(primary or secondary)? CHF   Does the patient have Home health ordered? Yes   Is there a DME ordered? Yes   What DME was ordered? [facility]   Prep survey completed? Yes          Rema Verma RN         no

## 2024-04-09 RX ORDER — POTASSIUM CHLORIDE 1500 MG/1
20 TABLET, EXTENDED RELEASE ORAL 2 TIMES DAILY
Qty: 60 TABLET | Refills: 0 | Status: SHIPPED | OUTPATIENT
Start: 2024-04-09

## 2024-04-09 RX ORDER — TORSEMIDE 20 MG/1
60 TABLET ORAL 2 TIMES DAILY
Qty: 180 TABLET | Refills: 0 | Status: SHIPPED | OUTPATIENT
Start: 2024-04-09

## 2024-04-23 ENCOUNTER — HOSPITAL ENCOUNTER (OUTPATIENT)
Dept: CARDIOLOGY | Facility: HOSPITAL | Age: 79
Discharge: HOME | End: 2024-04-23
Payer: MEDICARE

## 2024-04-23 ENCOUNTER — TELEPHONE (OUTPATIENT)
Dept: CARDIOLOGY | Facility: HOSPITAL | Age: 79
End: 2024-04-23

## 2024-04-23 ENCOUNTER — LAB (OUTPATIENT)
Dept: LAB | Facility: HOSPITAL | Age: 79
End: 2024-04-23
Payer: MEDICARE

## 2024-04-23 ENCOUNTER — TELEPHONE (OUTPATIENT)
Dept: CARDIOLOGY | Facility: CLINIC | Age: 79
End: 2024-04-23

## 2024-04-23 VITALS
OXYGEN SATURATION: 94 % | HEART RATE: 72 BPM | HEIGHT: 65 IN | WEIGHT: 293 LBS | DIASTOLIC BLOOD PRESSURE: 48 MMHG | SYSTOLIC BLOOD PRESSURE: 105 MMHG | BODY MASS INDEX: 48.82 KG/M2

## 2024-04-23 DIAGNOSIS — I27.20 PULMONARY HYPERTENSION: Primary | ICD-10-CM

## 2024-04-23 DIAGNOSIS — Z66 DNR (DO NOT RESUSCITATE): ICD-10-CM

## 2024-04-23 DIAGNOSIS — I50.20 HFREF (HEART FAILURE WITH REDUCED EJECTION FRACTION): ICD-10-CM

## 2024-04-23 LAB
ALBUMIN SERPL-MCNC: 4 G/DL (ref 3.5–5.2)
ANION GAP SERPL CALCULATED.3IONS-SCNC: 11.3 MMOL/L (ref 5–15)
BUN SERPL-MCNC: 27 MG/DL (ref 8–23)
BUN/CREAT SERPL: 17.5 (ref 7–25)
CALCIUM SPEC-SCNC: 9.1 MG/DL (ref 8.6–10.5)
CHLORIDE SERPL-SCNC: 103 MMOL/L (ref 98–107)
CO2 SERPL-SCNC: 26.7 MMOL/L (ref 22–29)
CREAT SERPL-MCNC: 1.54 MG/DL (ref 0.57–1)
DEPRECATED RDW RBC AUTO: 44.8 FL (ref 37–54)
EGFRCR SERPLBLD CKD-EPI 2021: 34.2 ML/MIN/1.73
ERYTHROCYTE [DISTWIDTH] IN BLOOD BY AUTOMATED COUNT: 13.2 % (ref 12.3–15.4)
GLUCOSE SERPL-MCNC: 101 MG/DL (ref 65–99)
HCT VFR BLD AUTO: 43.2 % (ref 34–46.6)
HGB BLD-MCNC: 13.7 G/DL (ref 12–15.9)
MCH RBC QN AUTO: 29.3 PG (ref 26.6–33)
MCHC RBC AUTO-ENTMCNC: 31.7 G/DL (ref 31.5–35.7)
MCV RBC AUTO: 92.3 FL (ref 79–97)
NT-PROBNP SERPL-MCNC: 2283 PG/ML (ref 0–1800)
PHOSPHATE SERPL-MCNC: 3.2 MG/DL (ref 2.5–4.5)
PLATELET # BLD AUTO: 198 10*3/MM3 (ref 140–450)
PMV BLD AUTO: 10.6 FL (ref 6–12)
POTASSIUM SERPL-SCNC: 3.8 MMOL/L (ref 3.5–5.2)
RBC # BLD AUTO: 4.68 10*6/MM3 (ref 3.77–5.28)
SODIUM SERPL-SCNC: 141 MMOL/L (ref 136–145)
WBC NRBC COR # BLD AUTO: 7.3 10*3/MM3 (ref 3.4–10.8)

## 2024-04-23 PROCEDURE — 36415 COLL VENOUS BLD VENIPUNCTURE: CPT | Performed by: INTERNAL MEDICINE

## 2024-04-23 PROCEDURE — 94726 PLETHYSMOGRAPHY LUNG VOLUMES: CPT | Performed by: INTERNAL MEDICINE

## 2024-04-23 PROCEDURE — 99214 OFFICE O/P EST MOD 30 MIN: CPT | Performed by: INTERNAL MEDICINE

## 2024-04-23 PROCEDURE — 85027 COMPLETE CBC AUTOMATED: CPT | Performed by: INTERNAL MEDICINE

## 2024-04-23 PROCEDURE — G0463 HOSPITAL OUTPT CLINIC VISIT: HCPCS

## 2024-04-23 PROCEDURE — 83880 ASSAY OF NATRIURETIC PEPTIDE: CPT | Performed by: INTERNAL MEDICINE

## 2024-04-23 PROCEDURE — 80069 RENAL FUNCTION PANEL: CPT | Performed by: INTERNAL MEDICINE

## 2024-04-23 NOTE — LETTER
April 23, 2024       No Recipients    Patient: Unique Talavera   YOB: 1945   Date of Visit: 4/23/2024     Dear Elvis Diaz MD:       Thank you for referring Unique Talavera to me for evaluation. Below are the relevant portions of my assessment and plan of care.    If you have questions, please do not hesitate to call me. I look forward to following Unique along with you.         Sincerely,        Michael Sorensen MD PhD        CC:   No Recipients    Michael Sorensen MD PhD  04/23/24 0920  Signed  Heart Failure & Pulmonary Arterial Hypertension Clinic  [facility]  Michael Sorensen M.D., Ph.D., [facility]       Lloyd Martines MD  [address]  [address]  [place] [address]    Thank you for asking me to see Unique Talavera for congestive heart failure.    History of Present Illness  This is a 78 y.o. female with:    1. PH and NICM    Unique Talavera is a 78 y.o. female who presents for today for CHF.  The patient is typically seen by Elvis Diaz MD.  Patient's primary cardiologist is Dr. Martines.     The [facility] in follow-up for her longstanding NICM.  She was diagnosed in 2016 with severe LV dysfunction.  LVEF was around 15%.  Invasive coronary angiography revealed no epicardial disease.  She did undergo some GDMT.  LVEF improved to the 30s.  This has been complicated by pulmonary hypertension and right ventricular dysfunction.  When I last saw her last fall she was coming off and index admission for ADHF.  Her clinical course has been complicated by minimal adherence to GDMT, reluctance for GDMT, and a desire to not come to the clinic very often.  Because of this, she is only on Toprol XL and torsemide.  She was agreeable to start a SGLT2 inhibitor I will last year.  She is adherent to these medications and denies adverse effects.  Today, she reports clinical stability.  No emergency department visits or inpatient admissions related to heart  failure.  She reports stable exercise intolerance and denies orthopnea, PND, ascites, and early abdominal satiety. She remains with LE edema. She has gained about 7 lbs since her last visit, though she thinks this is related to increased food. She is endorsing more fatigue.     Review of Systems - Review of Systems   Cardiovascular:  Positive for dyspnea on exertion and leg swelling.   Respiratory:  Positive for cough, shortness of breath and snoring.    All other systems reviewed and are negative.       All other systems were reviewed and were negative.    Patient Active Problem List   Diagnosis   • Persistent atrial fibrillation   • Arthritis   • Morbid obesity   • NICM (nonischemic cardiomyopathy)   • Vertigo   • Daytime sleepiness   • DNR (do not resuscitate)   • Essential hypertension   • Pulmonary hypertension   • Sleep apnea-like behavior   • Acute on chronic systolic CHF (congestive heart failure)       family history includes Heart disease in her mother; Stroke in her father.     reports that she quit smoking about 52 years ago. Her smoking use included cigarettes. She has never used smokeless tobacco. She reports that she does not currently use alcohol. She reports that she does not use drugs.    No Known Allergies      Current Outpatient Medications:   •  empagliflozin (Jardiance) 10 MG tablet tablet, Take 1 tablet by mouth Daily., Disp: 30 tablet, Rfl: 3  •  metoprolol succinate XL (TOPROL-XL) 100 MG 24 hr tablet, TAKE ONE TABLET BY MOUTH TWICE A DAY, Disp: 180 tablet, Rfl: 3  •  Multiple Vitamins-Minerals (PRESERVISION AREDS 2 PO), Take 1 dose by mouth 2 (Two) Times a Day., Disp: , Rfl:   •  potassium chloride ER (K-TAB) 20 MEQ tablet controlled-release ER tablet, TAKE 1 TABLET BY MOUTH TWICE A DAY, Disp: 60 tablet, Rfl: 0  •  rivaroxaban (XARELTO) 15 MG tablet, Take 1 tablet by mouth Daily With Dinner. Indications: Atrial Fibrillation, Disp: 42 tablet, Rfl: 0  •  torsemide (DEMADEX) 20 MG tablet, Take  3 tablets by mouth 2 (Two) Times a Day., Disp: 180 tablet, Rfl: 0      Physical Exam:  I have reviewed the patient's current vital signs as documented in the patient's EMR.   Vitals:    04/23/24 0900   BP: 105/48   Pulse: 72   SpO2: 94%       Body mass index is 49.26 kg/m².       04/23/24  0900   Weight: 134 kg (296 lb)          Vitals reviewed.   Constitutional:       General: Not in acute distress.     Appearance: Normal and healthy appearance. Well-developed, well-groomed and not in distress. Morbidly obese. Not ill-appearing, toxic-appearing or diaphoretic.      Interventions: Not intubated.  Neck:      Vascular: No JVR. JVD elevated with 9 cm of water.   Pulmonary:      Effort: Pulmonary effort is normal. No tachypnea, bradypnea, accessory muscle usage, prolonged expiration, respiratory distress or retractions. Not intubated.      Breath sounds: Normal breath sounds and air entry. No stridor, decreased air movement or transmitted upper airway sounds. No decreased breath sounds. No wheezing. No rhonchi. No rales.   Cardiovascular:      PMI at left midclavicular line. Normal rate. Regular rhythm. S1 with normal intensity. S2 with normal intensity.       Murmurs: There is no murmur.      No gallop.  No click. No rub.   Neurological:      General: No focal deficit present.      Mental Status: Alert and oriented to person, place and time.   Psychiatric:         Behavior: Behavior is cooperative.           DATA REVIEWED:     ---------------------------------------------------  TTE/TOMASA:  Results for orders placed during the hospital encounter of 08/04/17    Adult Transthoracic Echo Complete With Contrast    Interpretation Summary  · Left ventricular systolic function is moderately decreased. Calculated EF = 36.9%. Estimated EF was in agreement with the calculated EF. There is left ventricular global hypokinesis noted. The left ventricular cavity is moderately dilated. Left ventricular diastolic was unable to be  "assessed.  · Right ventricular cavity is mildly dilated. Mildly reduced right ventricular systolic function noted.  · Left atrial cavity size is moderately dilated.  · Estimated right ventricular systolic pressure from tricuspid regurgitation is mildly elevated (35-45 mmHg). Calculated right ventricular systolic pressure from tricuspid regurgitation is 37 mmHg.      My interpretation of the TTE is below.     LVEF is 37%.  LV cavity is dilated.  Pulmonary hypertension.  RV cavity is dilated with reduced systolic function.    -----------------------------------------------------      Laboratory evaluations:    Lab Results   Component Value Date    GLUCOSE 86 09/13/2023    BUN 34 (H) 09/13/2023    CREATININE 1.46 (H) 09/13/2023    EGFRIFNONA 56 (L) 02/19/2022    BCR 23.3 09/13/2023    K 3.9 09/13/2023    CO2 27.0 09/13/2023    CALCIUM 9.3 09/13/2023    ALBUMIN 4.0 08/28/2023    AST 20 08/28/2023    ALT 11 08/28/2023     Lab Results   Component Value Date    WBC 7.99 08/30/2023    HGB 12.7 08/30/2023    HCT 38.2 08/30/2023    MCV 90.7 08/30/2023     08/30/2023     Lab Results   Component Value Date    CHOL 105 07/21/2016    TRIG 63 07/21/2016    HDL 42 07/21/2016    LDL 50 07/21/2016     Lab Results   Component Value Date    TSH 5.520 (H) 04/11/2016     Lab Results   Component Value Date    TROPONINT 13 (H) 08/28/2023     Lab Results   Component Value Date    HGBA1C 5.70 (H) 12/28/2022     No results found for: \"DDIMER\"  Lab Results   Component Value Date    ALT 11 08/28/2023     Lab Results   Component Value Date    HGBA1C 5.70 (H) 12/28/2022    HGBA1C 5.80 (H) 07/21/2016     Lab Results   Component Value Date    CREATININE 1.46 (H) 09/13/2023     No results found for: \"IRON\", \"TIBC\", \"FERRITIN\"  Lab Results   Component Value Date    INR 1.12 (H) 04/11/2016    PROTIME 14.2 04/11/2016     Assessment & Plan       PAH RISK ASSESSMENT:    ReDs Vest    Performed by: Michael Sorensen MD PhD  Authorized by: " Michael Sorensen MD PhD    ReDS value:  31      1. Pulmonary hypertension    2. HFrEF (heart failure with reduced ejection fraction)    3. DNR (do not resuscitate)      1.  She has WHO-group-2/3 secondary pulmonary hypertension.  This is likely from her NICM, restrictive lung physiology from obesity, and almost certainly untreated JASON.  Unfortunately, she has developed right ventricular involvement.  Her last 2D TTE is from 2017.  She refuses to update her echocardiogram.  She refuses to see sleep medicine for a sleep study.  For now, because of these limitations, I have encouraged her to continue optimization of her HFrEF regimen, as below.    2.  NYHA stage C; functional class III.  Today, she remains hypervolemic at her baseline, but perfused.  Her ongoing intent is palliative therapy and to avoid emergency department visits and HFH.    Today, she has reiterated that she is not interested in updating her 2D TTE.  She is not interested in the addition of other medications.  She is not interested in out patient palliative consultation.  I did inform her today that she will require at least bi-annual labs in order to continue refills on her medications, so she is due for labs today.    - Toprol-XL  - Jardiance with sick precautions  - Torsemide with potassium supplementation  - Chem-7 and proBNP.  Will call with results.    3. DNR/DNI. Prefers palliative approach to care.       Return in about 6 months (around 10/23/2024).          This document has been electronically signed by Michael Sorensen MD PhD on April 23, 2024 09:19 EDT      Michael Sorensen M.D., Ph.D., [facility]  System Medical Director for HF and PAH

## 2024-04-23 NOTE — PROGRESS NOTES
Heart Failure & Pulmonary Arterial Hypertension Clinic  [facility]  Michael Sorensen M.D., Ph.D., [facility]       Lloyd Martines MD  [address]  [address]  [place] [address]    Thank you for asking me to see Unique Talavera for congestive heart failure.    History of Present Illness  This is a 78 y.o. female with:    1. PH and NICM    Unique Talavera is a 78 y.o. female who presents for today for CHF.  The patient is typically seen by Elvis Diaz MD.  Patient's primary cardiologist is Dr. Martines.     The patient [facility] today in follow-up for her longstanding NICM.  She was diagnosed in 2016 with severe LV dysfunction.  LVEF was around 15%.  Invasive coronary angiography revealed no epicardial disease.  She did undergo some GDMT.  LVEF improved to the 30s.  This has been complicated by pulmonary hypertension and right ventricular dysfunction.  When I last saw her last fall she was coming off and index admission for ADHF.  Her clinical course has been complicated by minimal adherence to GDMT, reluctance for GDMT, and a desire to not come to the clinic very often.  Because of this, she is only on Toprol XL and torsemide.  She was agreeable to start a SGLT2 inhibitor I will last year.  She is adherent to these medications and denies adverse effects.  Today, she reports clinical stability.  No emergency department visits or inpatient admissions related to heart failure.  She reports stable exercise intolerance and denies orthopnea, PND, ascites, and early abdominal satiety. She remains with LE edema. She has gained about 7 lbs since her last visit, though she thinks this is related to increased food. She is endorsing more fatigue.     Review of Systems - Review of Systems   Cardiovascular:  Positive for dyspnea on exertion and leg swelling.   Respiratory:  Positive for cough, shortness of breath and snoring.    All other systems reviewed and are negative.       All other systems  were reviewed and were negative.    Patient Active Problem List   Diagnosis    Persistent atrial fibrillation    Arthritis    Morbid obesity    NICM (nonischemic cardiomyopathy)    Vertigo    Daytime sleepiness    DNR (do not resuscitate)    Essential hypertension    Pulmonary hypertension    Sleep apnea-like behavior    Acute on chronic systolic CHF (congestive heart failure)       family history includes Heart disease in her mother; Stroke in her father.     reports that she quit smoking about 52 years ago. Her smoking use included cigarettes. She has never used smokeless tobacco. She reports that she does not currently use alcohol. She reports that she does not use drugs.    No Known Allergies      Current Outpatient Medications:     empagliflozin (Jardiance) 10 MG tablet tablet, Take 1 tablet by mouth Daily., Disp: 30 tablet, Rfl: 3    metoprolol succinate XL (TOPROL-XL) 100 MG 24 hr tablet, TAKE ONE TABLET BY MOUTH TWICE A DAY, Disp: 180 tablet, Rfl: 3    Multiple Vitamins-Minerals (PRESERVISION AREDS 2 PO), Take 1 dose by mouth 2 (Two) Times a Day., Disp: , Rfl:     potassium chloride ER (K-TAB) 20 MEQ tablet controlled-release ER tablet, TAKE 1 TABLET BY MOUTH TWICE A DAY, Disp: 60 tablet, Rfl: 0    rivaroxaban (XARELTO) 15 MG tablet, Take 1 tablet by mouth Daily With Dinner. Indications: Atrial Fibrillation, Disp: 42 tablet, Rfl: 0    torsemide (DEMADEX) 20 MG tablet, Take 3 tablets by mouth 2 (Two) Times a Day., Disp: 180 tablet, Rfl: 0      Physical Exam:  I have reviewed the patient's current vital signs as documented in the patient's EMR.   Vitals:    04/23/24 0900   BP: 105/48   Pulse: 72   SpO2: 94%       Body mass index is 49.26 kg/m².       04/23/24  0900   Weight: 134 kg (296 lb)          Vitals reviewed.   Constitutional:       General: Not in acute distress.     Appearance: Normal and healthy appearance. Well-developed, well-groomed and not in distress. Morbidly obese. Not ill-appearing,  toxic-appearing or diaphoretic.      Interventions: Not intubated.  Neck:      Vascular: No JVR. JVD normal with 6 cm of water.   Pulmonary:      Effort: Pulmonary effort is normal. No tachypnea, bradypnea, accessory muscle usage, prolonged expiration, respiratory distress or retractions. Not intubated.      Breath sounds: Normal breath sounds and air entry. No stridor, decreased air movement or transmitted upper airway sounds. No decreased breath sounds. No wheezing. No rhonchi. No rales.   Cardiovascular:      PMI at left midclavicular line. Normal rate. Irregularly irregular rhythm. S1 with normal intensity. S2 with normal intensity.       Murmurs: There is no murmur.      No gallop.  No click. No rub.   Neurological:      General: No focal deficit present.      Mental Status: Alert and oriented to person, place and time.   Psychiatric:         Behavior: Behavior is cooperative.             DATA REVIEWED:     ---------------------------------------------------  TTE/TOMASA:  Results for orders placed during the hospital encounter of 08/04/17    Adult Transthoracic Echo Complete With Contrast    Interpretation Summary  · Left ventricular systolic function is moderately decreased. Calculated EF = 36.9%. Estimated EF was in agreement with the calculated EF. There is left ventricular global hypokinesis noted. The left ventricular cavity is moderately dilated. Left ventricular diastolic was unable to be assessed.  · Right ventricular cavity is mildly dilated. Mildly reduced right ventricular systolic function noted.  · Left atrial cavity size is moderately dilated.  · Estimated right ventricular systolic pressure from tricuspid regurgitation is mildly elevated (35-45 mmHg). Calculated right ventricular systolic pressure from tricuspid regurgitation is 37 mmHg.      My interpretation of the TTE is below.     LVEF is 37%.  LV cavity is dilated.  Pulmonary hypertension.  RV cavity is dilated with reduced systolic  "function.    -----------------------------------------------------      Laboratory evaluations:    Lab Results   Component Value Date    GLUCOSE 86 09/13/2023    BUN 34 (H) 09/13/2023    CREATININE 1.46 (H) 09/13/2023    EGFRIFNONA 56 (L) 02/19/2022    BCR 23.3 09/13/2023    K 3.9 09/13/2023    CO2 27.0 09/13/2023    CALCIUM 9.3 09/13/2023    ALBUMIN 4.0 08/28/2023    AST 20 08/28/2023    ALT 11 08/28/2023     Lab Results   Component Value Date    WBC 7.99 08/30/2023    HGB 12.7 08/30/2023    HCT 38.2 08/30/2023    MCV 90.7 08/30/2023     08/30/2023     Lab Results   Component Value Date    CHOL 105 07/21/2016    TRIG 63 07/21/2016    HDL 42 07/21/2016    LDL 50 07/21/2016     Lab Results   Component Value Date    TSH 5.520 (H) 04/11/2016     Lab Results   Component Value Date    TROPONINT 13 (H) 08/28/2023     Lab Results   Component Value Date    HGBA1C 5.70 (H) 12/28/2022     No results found for: \"DDIMER\"  Lab Results   Component Value Date    ALT 11 08/28/2023     Lab Results   Component Value Date    HGBA1C 5.70 (H) 12/28/2022    HGBA1C 5.80 (H) 07/21/2016     Lab Results   Component Value Date    CREATININE 1.46 (H) 09/13/2023     No results found for: \"IRON\", \"TIBC\", \"FERRITIN\"  Lab Results   Component Value Date    INR 1.12 (H) 04/11/2016    PROTIME 14.2 04/11/2016     Assessment & Plan       PAH RISK ASSESSMENT:    ReDs Vest    Performed by: Michael Sorensen MD PhD  Authorized by: Michael Sorensen MD PhD    ReDS value:  31      1. Pulmonary hypertension    2. HFrEF (heart failure with reduced ejection fraction)    3. DNR (do not resuscitate)      1.  She has WHO-group-2/3 secondary pulmonary hypertension.  This is likely from her NICM, restrictive lung physiology from obesity, and almost certainly untreated JASON.  Unfortunately, she has developed right ventricular involvement.  Her last 2D TTE is from 2017.  She refuses to update her echocardiogram.  She refuses to see sleep medicine " for a sleep study.  For now, because of these limitations, I have encouraged her to continue optimization of her HFrEF regimen, as below.    2.  NYHA stage C; functional class III.  Today, she is even anemic and perfused.  Her ongoing intent is palliative therapy and to avoid emergency department visits and HFH.    Today, she has reiterated that she is not interested in updating her 2D TTE.  She is not interested in the addition of other medications.  She is not interested in out patient palliative consultation.  I did inform her today that she will require at least bi-annual labs in order to continue refills on her medications, so she is due for labs today.    - Toprol-XL  - Jardiance with sick precautions  - Torsemide with potassium supplementation  - Chem-7 and proBNP.  Will call with results.    3. DNR/DNI. Prefers palliative approach to care.       Return in about 6 months (around 10/23/2024).          This document has been electronically signed by Michael Sorensen MD PhD on April 23, 2024 09:19 EDT      Michael Sorensen M.D., Ph.D., [facility]  System Medical Director for HF and PAH

## 2024-04-23 NOTE — ADDENDUM NOTE
Encounter addended by: Michael Sorensen MD PhD on: 4/23/2024 9:22 AM   Actions taken: SmartForm saved, Clinical Note Signed

## 2024-04-23 NOTE — ADDENDUM NOTE
Encounter addended by: Ailyn Acosta MA on: 4/23/2024 10:59 AM   Actions taken: Charge Capture section accepted

## 2024-04-23 NOTE — TELEPHONE ENCOUNTER
Spoke to patient. Labs are stable. I have recommended she take potassium as scheduled with her diuretics..

## 2024-05-06 RX ORDER — POTASSIUM CHLORIDE 1500 MG/1
20 TABLET, EXTENDED RELEASE ORAL 2 TIMES DAILY
Qty: 60 TABLET | Refills: 1 | Status: SHIPPED | OUTPATIENT
Start: 2024-05-06

## 2024-05-06 RX ORDER — TORSEMIDE 20 MG/1
60 TABLET ORAL 2 TIMES DAILY
Qty: 180 TABLET | Refills: 1 | Status: SHIPPED | OUTPATIENT
Start: 2024-05-06

## 2024-06-04 ENCOUNTER — OFFICE VISIT (OUTPATIENT)
Age: 79
End: 2024-06-04
Payer: MEDICARE

## 2024-06-04 VITALS
DIASTOLIC BLOOD PRESSURE: 84 MMHG | HEART RATE: 74 BPM | HEIGHT: 65 IN | WEIGHT: 293 LBS | BODY MASS INDEX: 48.82 KG/M2 | SYSTOLIC BLOOD PRESSURE: 134 MMHG

## 2024-06-04 DIAGNOSIS — I42.8 NICM (NONISCHEMIC CARDIOMYOPATHY): ICD-10-CM

## 2024-06-04 DIAGNOSIS — I50.20 HFREF (HEART FAILURE WITH REDUCED EJECTION FRACTION): ICD-10-CM

## 2024-06-04 DIAGNOSIS — I10 ESSENTIAL HYPERTENSION: ICD-10-CM

## 2024-06-04 DIAGNOSIS — I48.21 PERMANENT ATRIAL FIBRILLATION: Primary | ICD-10-CM

## 2024-06-04 PROBLEM — I50.23 ACUTE ON CHRONIC SYSTOLIC CHF (CONGESTIVE HEART FAILURE): Status: RESOLVED | Noted: 2023-08-28 | Resolved: 2024-06-04

## 2024-06-04 PROCEDURE — 1159F MED LIST DOCD IN RCRD: CPT | Performed by: PHYSICIAN ASSISTANT

## 2024-06-04 PROCEDURE — 93000 ELECTROCARDIOGRAM COMPLETE: CPT | Performed by: PHYSICIAN ASSISTANT

## 2024-06-04 PROCEDURE — 99214 OFFICE O/P EST MOD 30 MIN: CPT | Performed by: PHYSICIAN ASSISTANT

## 2024-06-04 PROCEDURE — 3075F SYST BP GE 130 - 139MM HG: CPT | Performed by: PHYSICIAN ASSISTANT

## 2024-06-04 PROCEDURE — 3079F DIAST BP 80-89 MM HG: CPT | Performed by: PHYSICIAN ASSISTANT

## 2024-06-04 PROCEDURE — 1160F RVW MEDS BY RX/DR IN RCRD: CPT | Performed by: PHYSICIAN ASSISTANT

## 2024-06-04 NOTE — PATIENT INSTRUCTIONS
Keep Blood pressure log with goal of -140 and DBP  60-90, contact office if multiple readings out of range    Limit fluid intake to less than 1800 ml per day    Keep daily weight log. If over 3 lbs weight gain in 1 day or 5 lbs weight gain in 1 week contact office for medication recommendations.

## 2024-06-04 NOTE — PROGRESS NOTES
"      ELECTROPHYSIOLOGY   Date of Office Visit: 2024  Patient Name: Unqiue Talavera  : 1945  Encounter Provider: Marty Amor PA-C  Electrophysiologist: Dr. Martines  CHIEF COMPLAINT / REASON FOR OFFICE VISIT     persistent AFIB and NICM  1 year  follow up    HISTORY OF PRESENT ILLNESS     This is a 78 y.o. year old female who presents to [facility] CARDIOLOGY for a for a 1 year follow up.     She has permanent atrial fibrillation.    She has history of nonischemic cardiomyopathy diagnosed in 2016.  At that time she was started on guideline medical therapy and on repeat echo in 2017 her ejection fraction improved to 37%.    She follows with Dr. Sorensen at the heart failure clinic and was last seen on 2024.  She follows with them for pulmonary hypertension that is thought to be due to NICM versus untreated sleep apnea.  Her last echo was from .     She has gained 5 lbs since her [facility] follow up.     Today the patient reports she has been doing okay.  She does complain of poor sleep and will sometimes have symptoms that I think are orthopnea.  She denies any palpitations, recent dizziness, chest pain or worsening lower extremity edema.    She has been compliant in taking all of her medications    She still wishes to pursue noninvasive care and is preferring a palliative approach.    Wheel chair bound when out of the house.  At home she uses her cane and walker.    On rivaroxaban 15 for anticoagulation.    PMHx: Persistent atrial fibrillation, nonischemic cardiomyopathy, severe obesity, pulmonary hypertension, hypertension    PHYSICAL EXAMINATION     Vital Signs:  /84   Pulse 74   Ht 165.1 cm (65\")   Wt (!) 137 kg (301 lb)   BMI 50.09 kg/m²   Estimated body mass index is 50.09 kg/m² as calculated from the following:    Height as of this encounter: 165.1 cm (65\").    Weight as of this encounter: 137 kg (301 lb).             Physical Exam  Constitutional:       " Appearance: Normal appearance.   HENT:      Head: Normocephalic and atraumatic.   Cardiovascular:      Rate and Rhythm: Bradycardia present. Rhythm irregular.      Heart sounds: No murmur heard.  Pulmonary:      Effort: Pulmonary effort is normal.      Breath sounds: Normal breath sounds.   Musculoskeletal:      Right lower leg: No edema.      Left lower leg: No edema.   Skin:     General: Skin is warm and dry.   Neurological:      General: No focal deficit present.      Mental Status: She is alert and oriented to person, place, and time.          Cardiac Testing/Results     Cardiac Testing:   - Echo 8/2017: EF 36.9% with global hypokinesis. Moderately dilated LA. Mildly reduced RV function. Moderately dilated LA. RVSP 37 mmHG.   -Stress Test    Result Review :  The following data was reviewed by: Marty Amor PA-C on 06/04/2024:       Lab Results   Component Value Date     04/23/2024     09/13/2023    K 3.8 04/23/2024    K 3.9 09/13/2023     04/23/2024     09/13/2023    CO2 26.7 04/23/2024    CO2 27.0 09/13/2023    BUN 27 (H) 04/23/2024    BUN 34 (H) 09/13/2023    CREATININE 1.54 (H) 04/23/2024    CREATININE 1.46 (H) 09/13/2023    EGFRIFNONA 56 (L) 02/19/2022    EGFRIFNONA 62 02/18/2022    GLUCOSE 101 (H) 04/23/2024    GLUCOSE 86 09/13/2023    CALCIUM 9.1 04/23/2024    CALCIUM 9.3 09/13/2023    ALBUMIN 4.0 04/23/2024    ALBUMIN 4.0 08/28/2023    AST 20 08/28/2023    AST 23 12/28/2022    ALT 11 08/28/2023    ALT 17 12/28/2022     Lab Results   Component Value Date    WBC 7.30 04/23/2024    WBC 7.99 08/30/2023    HGB 13.7 04/23/2024    HGB 12.7 08/30/2023    HCT 43.2 04/23/2024    HCT 38.2 08/30/2023    MCV 92.3 04/23/2024    MCV 90.7 08/30/2023     04/23/2024     08/30/2023     Lab Results   Component Value Date    PROBNP 2,283.0 (H) 04/23/2024    PROBNP 3,415.0 (H) 09/13/2023     Lab Results   Component Value Date    TROPONINT 13 (H) 08/28/2023     Lab Results    Component Value Date    TSH 5.520 (H) 04/11/2016                 ECG 12 Lead    Date/Time: 6/4/2024 9:37 AM  Performed by: Marty Borja PA-C    Authorized by: Marty Borja PA-C  Comparison: compared with previous ECG from 8/23/2023  Rhythm: atrial fibrillation  Rate: normal  BPM: 74  T inversion: III  QRS axis: normal  Comments: QTc 419                ASSESSMENT & PLAN       Diagnoses and all orders for this visit:    1. Permanent atrial fibrillation (Primary)  Rate controlled atrial fibrillation noted on EKG today.  She is asymptomatic  Continue Toprol 100 mg daily  [id number] score of 4, continue rivaroxaban 15 mg daily.  Denies bleeding complications  2. NICM (nonischemic cardiomyopathy)  Longstanding history of nonischemic cardiomyopathy starting back in 2017  She has been following with the heart failure clinic for management  Reviewed symptoms of volume overload with her today in detail  It is previously been recommended that she complete a new echocardiogram to reevaluate her ejection fraction and I did think about considering this today however she has decided to pursue a palliative approach to her care with only medications  3. HFrEF (heart failure with reduced ejection fraction)  She has gained 5 pounds since her last visit.  I did not discern any significant volume overload on exam but did recommend that she increase her torsemide 1 extra tablet for the next 3 days.  Continue SGLT2 inhibitor, torsemide, Toprol  Not on ACE or ARB due to patient preference  4. Essential hypertension  Blood pressures been well-controlled.  Continue current medications      Follow Up:  Return in about 6 months (around 12/4/2024) for Dr. Martines- Routine.  Patient was given instructions and counseling regarding her condition or for health maintenance advice. Please contact office if worsening symptoms or proceed to ER when appropriate.      Marty Borja PA-C  06/04/24  09:51 EDT    MEDICATIONS          Discharge Medications            Accurate as of June 4, 2024  9:51 AM. If you have any questions, ask your nurse or doctor.                Continue These Medications        Instructions Start Date   empagliflozin 10 MG tablet tablet  Commonly known as: Jardiance   10 mg, Oral, Daily      metoprolol succinate  MG 24 hr tablet  Commonly known as: TOPROL-XL   100 mg, Oral, 2 Times Daily      potassium chloride ER 20 MEQ tablet controlled-release ER tablet  Commonly known as: K-TAB   20 mEq, Oral, 2 Times Daily      PRESERVISION AREDS 2 PO   1 dose, Oral, 2 Times Daily      torsemide 20 MG tablet  Commonly known as: DEMADEX   60 mg, Oral, 2 Times Daily      Xarelto 15 MG tablet  Generic drug: rivaroxaban   15 mg, Oral, Daily With Dinner                   **Phoebe Disclaimer: This note was dictated using an electronic transcription. The electronic translation of spoken language may permit erroneous, or at times, nonsensical words or phrases to be inadvertently transcribed. Although I have reviewed the note for such errors, some may still exist.

## 2024-07-09 RX ORDER — POTASSIUM CHLORIDE 1500 MG/1
20 TABLET, EXTENDED RELEASE ORAL 2 TIMES DAILY
Qty: 60 TABLET | Refills: 1 | Status: SHIPPED | OUTPATIENT
Start: 2024-07-09

## 2024-07-10 ENCOUNTER — TELEPHONE (OUTPATIENT)
Dept: CARDIOLOGY | Facility: CLINIC | Age: 79
End: 2024-07-10
Payer: MEDICARE

## 2024-07-10 NOTE — TELEPHONE ENCOUNTER
Caller: Unique Talavera    Relationship: Self    Best call back number: [phone]    Who are you requesting to speak with (clinical staff, provider,  specific staff member): CLINICAL    What was the call regarding: PT SAID SHE RECEIVED A FAX FROM US TO HER PHONE NUMBER AND IT KEEPS BEEPNG. SHE THINKS WE FAXED IT TO THE WRONG PLACE. PT WOULD LIKE A CALL BACK

## 2024-07-10 NOTE — TELEPHONE ENCOUNTER
Caller: Unique Talavera    Relationship: Self    Best call back number: [phone]      PATIENT IS REQUESTING MOST RECENT LABS TO BE SENT TO HER PCP    FAX:[phone]

## 2024-08-12 RX ORDER — TORSEMIDE 20 MG/1
60 TABLET ORAL 2 TIMES DAILY
Qty: 180 TABLET | Refills: 1 | Status: SHIPPED | OUTPATIENT
Start: 2024-08-12

## 2024-09-11 RX ORDER — POTASSIUM CHLORIDE 1500 MG/1
20 TABLET, EXTENDED RELEASE ORAL 2 TIMES DAILY
Qty: 60 TABLET | Refills: 1 | Status: SHIPPED | OUTPATIENT
Start: 2024-09-11

## 2024-10-10 ENCOUNTER — TELEPHONE (OUTPATIENT)
Dept: CARDIOLOGY | Facility: HOSPITAL | Age: 79
End: 2024-10-10
Payer: MEDICARE

## 2024-10-10 NOTE — TELEPHONE ENCOUNTER
Patient called today & left a voicemail. She reports that she recently went to the ER. She was diagnosed with Pneumonia. Her D/C paperwork also stated a GAVIOTA. This visit & lab work is in her Care Everywhere.  She wanted to let Dr Sorensen know & see if she needs to do anything further.    She also states that she received paperwork in the mail regarding her PAP for Jardiance for re-enrollment. She is asking if she needs to do this or if the HF Clinic will take care of it.    Call back is [phone]    Thanks,  [facility]

## 2024-10-11 NOTE — TELEPHONE ENCOUNTER
Reviewed labs and spoke to patient. Renal function is stable compared to where it was in April 2024. Discussed she can follow-up with Dr. Sorensen as scheduled for November. Advised her to follow-up with PCP regarding pneumonia. Also asked patient to fill out her portion of Jardiance re-enrollment paperwork and to bring that to her appointment. We will complete the rest and fax it. No further questions at this time.    Niru Vyas, PharmD, BCACP  [facility]  Phone: [phone]

## 2024-10-15 ENCOUNTER — TELEPHONE (OUTPATIENT)
Dept: CARDIOLOGY | Facility: HOSPITAL | Age: 79
End: 2024-10-15
Payer: MEDICARE

## 2024-10-15 NOTE — TELEPHONE ENCOUNTER
Spoke with patient she states she is feeling better and would wait until she sees Dr Sorensen at end of month.

## 2024-10-15 NOTE — TELEPHONE ENCOUNTER
----- Message from Michael Sorensen sent at 10/11/2024 10:45 AM EDT -----  Yes, she needs to follow-up with her PCP  ----- Message -----  From: Ailyn Acosta MA  Sent: 10/10/2024   3:02 PM EDT  To: Michael Sorensen MD PhD    Patient called today & left a voicemail. She reports that she recently went to the ER. She was diagnosed with Pneumonia. Her D/C paperwork also stated a GAVIOTA. This visit & lab work is in her Care Everywhere.  She wanted to let Dr Sorensen know & see if she needs to do anything further.

## 2024-10-25 DIAGNOSIS — E66.01 MORBID OBESITY, UNSPECIFIED OBESITY TYPE: ICD-10-CM

## 2024-10-25 DIAGNOSIS — I50.41 ACUTE COMBINED SYSTOLIC AND DIASTOLIC HEART FAILURE: ICD-10-CM

## 2024-10-25 RX ORDER — METOPROLOL SUCCINATE 100 MG/1
100 TABLET, EXTENDED RELEASE ORAL 2 TIMES DAILY
Qty: 180 TABLET | Refills: 3 | Status: SHIPPED | OUTPATIENT
Start: 2024-10-25

## 2024-10-31 ENCOUNTER — TELEPHONE (OUTPATIENT)
Dept: CARDIOLOGY | Facility: HOSPITAL | Age: 79
End: 2024-10-31
Payer: MEDICARE

## 2024-10-31 NOTE — TELEPHONE ENCOUNTER
Patient called today. She states she has not received her Jardiance in the mail from [facility] PAP.  I called [facility] for patient & was able to request a refill. Patient has been out of Jardiance for 3 weeks now.   [facility] states they will expedite shipment today & patient should receive package tomorrow.  I called patient back & let her know to expect shipment tomorrow & to call me back if she does not receive it.    Patient verbalized understanding. No questions at this time.    Thanks,  Rica LEAL [facility]

## 2024-11-04 RX ORDER — TORSEMIDE 20 MG/1
60 TABLET ORAL 2 TIMES DAILY
Qty: 180 TABLET | Refills: 1 | Status: SHIPPED | OUTPATIENT
Start: 2024-11-04

## 2024-11-20 RX ORDER — POTASSIUM CHLORIDE 1500 MG/1
20 TABLET, EXTENDED RELEASE ORAL 2 TIMES DAILY
Qty: 60 TABLET | Refills: 0 | Status: SHIPPED | OUTPATIENT
Start: 2024-11-20

## 2024-12-30 RX ORDER — POTASSIUM CHLORIDE 1500 MG/1
20 TABLET, EXTENDED RELEASE ORAL 2 TIMES DAILY
Qty: 60 TABLET | Refills: 2 | Status: SHIPPED | OUTPATIENT
Start: 2024-12-30

## 2024-12-31 ENCOUNTER — HOSPITAL ENCOUNTER (OUTPATIENT)
Dept: CARDIOLOGY | Facility: HOSPITAL | Age: 79
Discharge: HOME | End: 2024-12-31
Payer: MEDICARE

## 2024-12-31 VITALS
DIASTOLIC BLOOD PRESSURE: 66 MMHG | SYSTOLIC BLOOD PRESSURE: 115 MMHG | HEART RATE: 82 BPM | HEIGHT: 65 IN | BODY MASS INDEX: 44.25 KG/M2 | OXYGEN SATURATION: 95 % | WEIGHT: 265.6 LBS

## 2024-12-31 DIAGNOSIS — I27.20 PULMONARY HYPERTENSION: Primary | ICD-10-CM

## 2024-12-31 DIAGNOSIS — R05.9 COUGH, UNSPECIFIED TYPE: ICD-10-CM

## 2024-12-31 DIAGNOSIS — I50.20 HFREF (HEART FAILURE WITH REDUCED EJECTION FRACTION): ICD-10-CM

## 2024-12-31 NOTE — LETTER
December 31, 2024     Lloyd Martines MD  [address]  [address]  [place] [id number]    Patient: Unique Talvaera   YOB: 1945   Date of Visit: 12/31/2024     Dear Lloyd Martines MD:       Thank you for referring Unique Talavera to me for evaluation. Below are the relevant portions of my assessment and plan of care.    If you have questions, please do not hesitate to call me. I look forward to following Unique along with you.         Sincerely,        SADAF Andino        CC: MD Jony Bosch, SADAF Clinton  12/31/24 1009  Signed    [facility]      History of Present Illness    1. Pulmonary hypertension  2. NICM/HFrEF    Subjective     Unique Talaverais a 79 y.o. female who presents today for Pulmonary pretension and nonischemic cardiomyopathy.--LVEF 36%  Diagnosed in 2016 with severe left ventricular failure-LVEF around 15 % at that time.  LVEF did improved to the 30s.  Heart failure complicated by pulmonary hypertension and right ventricular dysfunction.  Also complicated by minimal adherence to GDMT and reluctance for GDMT.  Last 2D TTE in 2017 LVEF 36.9%.  Left ventricular cavity moderately dilated.  Right ventricular cavity mildly dilated, mildly reduced right ventricular systolic function.    Today she reports stable weights. Was on wegovy and initially lost 30 lbs. She states since weight has been stable, no additional weight loss, but has not been gaining weight.   She does have discoloration of her left lower leg that she is concerned about.   She is also reporting a cough and generally not feeling well--was on an abx--last dose 2 weeks ago  She was in the ER in October--CXR showed infiltrate vs atelectasis was put on abx from ER--had f/u with Dr Diaz and was placed on abx again.  She has f/u with Dr Diaz in 2 days      Review of Systems - Review of Systems   Constitutional: Positive for malaise/fatigue. Negative for weight gain  and weight loss.   Cardiovascular:  Positive for dyspnea on exertion. Negative for chest pain, leg swelling, orthopnea, paroxysmal nocturnal dyspnea and syncope.   Respiratory:  Positive for cough and shortness of breath.    Gastrointestinal:  Negative for bloating.   Neurological:  Negative for dizziness.          Patient Active Problem List   Diagnosis   • Permanent atrial fibrillation   • Arthritis   • Morbid obesity   • NICM (nonischemic cardiomyopathy)   • Vertigo   • Daytime sleepiness   • DNR (do not resuscitate)   • Essential hypertension   • Pulmonary hypertension   • Sleep apnea-like behavior   • HFrEF (heart failure with reduced ejection fraction)     family history includes Heart disease in her mother; Stroke in her father.   reports that she quit smoking about 53 years ago. Her smoking use included cigarettes. She has never used smokeless tobacco. She reports that she does not currently use alcohol. She reports that she does not use drugs.  No Known Allergies  Physical Activity: Not on file          Current Outpatient Medications:   •  empagliflozin (Jardiance) 10 MG tablet tablet, Take 1 tablet by mouth Daily., Disp: 30 tablet, Rfl: 3  •  metoprolol succinate XL (TOPROL-XL) 100 MG 24 hr tablet, TAKE 1 TABLET BY MOUTH TWICE A DAY, Disp: 180 tablet, Rfl: 3  •  Multiple Vitamins-Minerals (PRESERVISION AREDS 2 PO), Take 1 dose by mouth 2 (Two) Times a Day., Disp: , Rfl:   •  potassium chloride ER (K-TAB) 20 MEQ tablet controlled-release ER tablet, TAKE 1 TABLET BY MOUTH 2 TIMES A DAY, Disp: 60 tablet, Rfl: 2  •  rivaroxaban (XARELTO) 15 MG tablet, Take 1 tablet by mouth Daily With Dinner. Indications: Atrial Fibrillation, Disp: 42 tablet, Rfl: 0  •  torsemide (DEMADEX) 20 MG tablet, TAKE 3 TABLETS BY MOUTH TWICE A DAY, Disp: 180 tablet, Rfl: 1    Objective  Vital Sign Review:   Vitals:    12/31/24 0857   BP: 115/66   Pulse: 82   SpO2: 95%     Body mass index is 44.2 kg/m².      12/31/24 0857   Weight: 120  kg (265 lb 9.6 oz)     Physical Exam:  Constitutional:       Appearance: Normal appearance. Not in distress. Frail. Chronically ill-appearing.   Neck:      Vascular: No carotid bruit or JVD. JVD normal.   Pulmonary:      Effort: Pulmonary effort is normal.      Breath sounds: Normal breath sounds.   Cardiovascular:      PMI at left midclavicular line. Normal rate. Regular rhythm.   Pulses:     Intact distal pulses.   Edema:     Peripheral edema present.     Pretibial: bilateral non-pitting edema of the pretibial area.     Ankle: bilateral non-pitting edema of the ankle.  Abdominal:      Palpations: Abdomen is soft.      Tenderness: There is no abdominal tenderness.   Musculoskeletal:        Legs:  Skin:     General: Skin is warm and dry.   Neurological:      General: No focal deficit present.      Mental Status: Alert and oriented to person, place and time.   Psychiatric:         Behavior: Behavior is cooperative.          DATA REVIEWED:   EKG:      ---------------------------------------------------  ECHO:    Results for orders placed during the hospital encounter of 08/04/17    Adult Transthoracic Echo Complete With Contrast    Interpretation Summary  · Left ventricular systolic function is moderately decreased. Calculated EF = 36.9%. Estimated EF was in agreement with the calculated EF. There is left ventricular global hypokinesis noted. The left ventricular cavity is moderately dilated. Left ventricular diastolic was unable to be assessed.  · Right ventricular cavity is mildly dilated. Mildly reduced right ventricular systolic function noted.  · Left atrial cavity size is moderately dilated.  · Estimated right ventricular systolic pressure from tricuspid regurgitation is mildly elevated (35-45 mmHg). Calculated right ventricular systolic pressure from tricuspid regurgitation is 37 mmHg.          -----------------------------------------------------  RHC/LHC    Results for orders placed during the hospital  "encounter of 04/11/16    Cardiac catheterization    Narrative  This study has been resulted via dictation. Please see dictated result for more details.      ---------------------------------------------------------------------------    CT:   No radiology results for the last 30 days.        --------------------------------------------------------------------------------------------------------------    Laboratory evaluations:  Renal Function: CrCl cannot be calculated (Patient's most recent lab result is older than the maximum 30 days allowed.).    Lab Results   Component Value Date    GLUCOSE 101 (H) 04/23/2024    BUN 27 (H) 04/23/2024    CREATININE 1.54 (H) 04/23/2024    EGFRIFNONA 56 (L) 02/19/2022    BCR 17.5 04/23/2024    K 3.8 04/23/2024    CO2 26.7 04/23/2024    CALCIUM 9.1 04/23/2024    ALBUMIN 4.0 04/23/2024    AST 20 08/28/2023    ALT 11 08/28/2023     Lab Results   Component Value Date    WBC 7.30 04/23/2024    HGB 13.7 04/23/2024    HCT 43.2 04/23/2024    MCV 92.3 04/23/2024     04/23/2024     Lab Results   Component Value Date    HGBA1C 5.70 (H) 12/28/2022     Lab Results   Component Value Date    HGBA1C 5.70 (H) 12/28/2022    HGBA1C 5.80 (H) 07/21/2016     Lab Results   Component Value Date    CREATININE 1.54 (H) 04/23/2024     No results found for: \"IRON\", \"TIBC\", \"FERRITIN\"    Result Review:  I have personally reviewed the results from the time of this admission to 12/31/2024 09:54 EST and agree with these findings:  [x]  Laboratory list / accordion  []  Microbiology  [x]  Radiology  [x]  EKG/Telemetry   [x]  Cardiology/Vascular   []  Pathology  [x]  Old records  []  Other:                 ReDS VOLUMETRIC ASSESSMENT:  UTO      Assessment & Plan     1. Pulmonary hypertension    2. HFrEF (heart failure with reduced ejection fraction)    3. Cough, unspecified type      Pulmonary hypertension-WHO-group-2/3.  Suspected from nonischemic cardiomyopathy and restrictive lung disease related to obesity. "  Patient has been on Wegovy and did initially have weight loss although stopped Wegovy recently-last week patient encouraged to speak with primary care about restarting.  She is not interested in updating 2D TTE  Declined sleep medicine to evaluate JASON.  We will continue GDMT for heart failure    HFrEF--LVEF 36%--she appears euvolemic today.   Labs reviewed from 10/3/2024 creatinine 1.59, GFR 33, sodium 138, potassium 4.6    HR is not at goal, on max dose of metoprolol  She supplied proof of income for patient assistance program for Jardiance today.   We will continue torsemide--she is generally taking 40mg BID, but will occasionally increase to 60mg BID.   We will continue GDMT as listed below--she is not interested in adjusting medications     3. Cough--She was in the ER in October--CXR showed infiltrate vs atelectasis was put on abx from ER--had f/u with Dr Diaz and was placed on abx.   Recommend f/u with PCP to ensure clearing after the 2 courses of abx.      NYHA stage C FC-III     Clinical status was assessed and has remained stable.  Treatment intent: curative   ReDS reading was not obtained today.  ReDS result: UTO       Today, Patient appears euvolemic. and with perfusion. The patient's hemodynamics are currently acceptable. HR is: normal and is not at goal. BP/MAP was reviewed and there is not room for medication up-titration.  The patient's clinical course has been impacted by CKD. LVEF: 36.     GDMT Assessment:       GDMT changes recommended today: No changes to medication therapy.      BB:   continue Metoprolol Succinate  100mg bid  Monitor heart rate.  Please call the [facility] for HR<50, dizziness, lightheadedness, syncope    A/A/A:     /66--I do not think BP would tolerate addition at this time, as well as patient is not interested in adjusting medications. She also previously was on lisinopril and it caused dizziness.   Occasional monitoring of Chem-7 is recommended; call for the  development of a new cough or S/Sx angioedema    SGLT2-I:  continue Empagliflozin (Jardiance) 10 mg daily  Call for S/Sx genital mycotic infections  Do not take when inadequate oral intake, NPO, GI illness    MRA:  The patient is FC-NYHA Class III and MRA is indicated.   Previously on spironolactone--but caused dizziness and was discontinued      Diuretic Regimen:  -DIURETIC:   toresemide 60 mg two times daily  -Fluid restriction:   -requested  -2000 ml  -Patient has been asked to weigh daily and was provided with a printed diuretic strategy.  -Sodium restriction:   -1,500 mg Na restriction was discussed.      Labs/Diagnostics/Referrals:    Labs -reviewed from 10/2024       Lifestyle Advice:   - call office if I gain more than 2 pounds in one day or 5 pounds in one week   - keep legs up while sitting   - use salt in moderation   - watch for swelling in feet, ankles and legs every day   - weigh myself daily   -call for concerning s/sx   -- activity or exercise based on tolerance encouraged     CODE STATUS: FULL              Return in about 6 months (around 6/30/2025).            Thank you for allowing me to participate in the care of your patient,       Tamela Borges, APRN  12/31/24  09:05 EST      **Phoebe Disclaimer:**  Much of this encounter note is an electronic transcription/translation of spoken language to printed text. The electronic translation of spoken language may permit erroneous, or at times, nonsensical words or phrases to be inadvertently transcribed. Although I have reviewed the note for such errors, some may still exist.    The information in this note was reviewed and updated during the visit to be as accurate as possible. As many patients have chronic medical problems, many of their individual problems and ongoing plans do not change significantly from visit to visit.  This information is correct and is consistent with my treatment plan as of today's visit.

## 2024-12-31 NOTE — PROGRESS NOTES
[facility]      History of Present Illness    1. Pulmonary hypertension  2. NICM/HFrEF    Subjective      Unique Bowen a 79 y.o. female who presents today for Pulmonary pretension and nonischemic cardiomyopathy.--LVEF 36%  Diagnosed in 2016 with severe left ventricular failure-LVEF around 15 % at that time.  LVEF did improved to the 30s.  Heart failure complicated by pulmonary hypertension and right ventricular dysfunction.  Also complicated by minimal adherence to GDMT and reluctance for GDMT.  Last 2D TTE in 2017 LVEF 36.9%.  Left ventricular cavity moderately dilated.  Right ventricular cavity mildly dilated, mildly reduced right ventricular systolic function.    Today she reports stable weights. Was on wegovy and initially lost 30 lbs. She states since weight has been stable, no additional weight loss, but has not been gaining weight.   She does have discoloration of her left lower leg that she is concerned about.   She is also reporting a cough and generally not feeling well--was on an abx--last dose 2 weeks ago  She was in the ER in October--CXR showed infiltrate vs atelectasis was put on abx from ER--had f/u with Dr Diaz and was placed on abx again.  She has f/u with Dr Diaz in 2 days      Review of Systems - Review of Systems   Constitutional: Positive for malaise/fatigue. Negative for weight gain and weight loss.   Cardiovascular:  Positive for dyspnea on exertion. Negative for chest pain, leg swelling, orthopnea, paroxysmal nocturnal dyspnea and syncope.   Respiratory:  Positive for cough and shortness of breath.    Gastrointestinal:  Negative for bloating.   Neurological:  Negative for dizziness.          Patient Active Problem List   Diagnosis    Permanent atrial fibrillation    Arthritis    Morbid obesity    NICM (nonischemic cardiomyopathy)    Vertigo    Daytime sleepiness    DNR (do not resuscitate)    Essential hypertension    Pulmonary hypertension     Sleep apnea-like behavior    HFrEF (heart failure with reduced ejection fraction)     family history includes Heart disease in her mother; Stroke in her father.   reports that she quit smoking about 53 years ago. Her smoking use included cigarettes. She has never used smokeless tobacco. She reports that she does not currently use alcohol. She reports that she does not use drugs.  No Known Allergies  Physical Activity: Not on file          Current Outpatient Medications:     empagliflozin (Jardiance) 10 MG tablet tablet, Take 1 tablet by mouth Daily., Disp: 30 tablet, Rfl: 3    metoprolol succinate XL (TOPROL-XL) 100 MG 24 hr tablet, TAKE 1 TABLET BY MOUTH TWICE A DAY, Disp: 180 tablet, Rfl: 3    Multiple Vitamins-Minerals (PRESERVISION AREDS 2 PO), Take 1 dose by mouth 2 (Two) Times a Day., Disp: , Rfl:     potassium chloride ER (K-TAB) 20 MEQ tablet controlled-release ER tablet, TAKE 1 TABLET BY MOUTH 2 TIMES A DAY, Disp: 60 tablet, Rfl: 2    rivaroxaban (XARELTO) 15 MG tablet, Take 1 tablet by mouth Daily With Dinner. Indications: Atrial Fibrillation, Disp: 42 tablet, Rfl: 0    torsemide (DEMADEX) 20 MG tablet, TAKE 3 TABLETS BY MOUTH TWICE A DAY, Disp: 180 tablet, Rfl: 1    Objective   Vital Sign Review:   Vitals:    12/31/24 0857   BP: 115/66   Pulse: 82   SpO2: 95%     Body mass index is 44.2 kg/m².      12/31/24  0857   Weight: 120 kg (265 lb 9.6 oz)     Physical Exam:  Constitutional:       Appearance: Normal appearance. Not in distress. Frail. Chronically ill-appearing.   Neck:      Vascular: No carotid bruit or JVD. JVD normal.   Pulmonary:      Effort: Pulmonary effort is normal.      Breath sounds: Normal breath sounds.   Cardiovascular:      PMI at left midclavicular line. Normal rate. Regular rhythm.   Pulses:     Intact distal pulses.   Edema:     Peripheral edema present.     Pretibial: bilateral non-pitting edema of the pretibial area.     Ankle: bilateral non-pitting edema of the ankle.  Abdominal:       Palpations: Abdomen is soft.      Tenderness: There is no abdominal tenderness.   Musculoskeletal:        Legs:  Skin:     General: Skin is warm and dry.   Neurological:      General: No focal deficit present.      Mental Status: Alert and oriented to person, place and time.   Psychiatric:         Behavior: Behavior is cooperative.          DATA REVIEWED:   EKG:      ---------------------------------------------------  ECHO:    Results for orders placed during the hospital encounter of 08/04/17    Adult Transthoracic Echo Complete With Contrast    Interpretation Summary  · Left ventricular systolic function is moderately decreased. Calculated EF = 36.9%. Estimated EF was in agreement with the calculated EF. There is left ventricular global hypokinesis noted. The left ventricular cavity is moderately dilated. Left ventricular diastolic was unable to be assessed.  · Right ventricular cavity is mildly dilated. Mildly reduced right ventricular systolic function noted.  · Left atrial cavity size is moderately dilated.  · Estimated right ventricular systolic pressure from tricuspid regurgitation is mildly elevated (35-45 mmHg). Calculated right ventricular systolic pressure from tricuspid regurgitation is 37 mmHg.          -----------------------------------------------------  RHC/LHC    Results for orders placed during the hospital encounter of 04/11/16    Cardiac catheterization    Narrative  This study has been resulted via dictation. Please see dictated result for more details.      ---------------------------------------------------------------------------    CT:   No radiology results for the last 30 days.        --------------------------------------------------------------------------------------------------------------    Laboratory evaluations:  Renal Function: CrCl cannot be calculated (Patient's most recent lab result is older than the maximum 30 days allowed.).    Lab Results   Component Value Date     "GLUCOSE 101 (H) 04/23/2024    BUN 27 (H) 04/23/2024    CREATININE 1.54 (H) 04/23/2024    EGFRIFNONA 56 (L) 02/19/2022    BCR 17.5 04/23/2024    K 3.8 04/23/2024    CO2 26.7 04/23/2024    CALCIUM 9.1 04/23/2024    ALBUMIN 4.0 04/23/2024    AST 20 08/28/2023    ALT 11 08/28/2023     Lab Results   Component Value Date    WBC 7.30 04/23/2024    HGB 13.7 04/23/2024    HCT 43.2 04/23/2024    MCV 92.3 04/23/2024     04/23/2024     Lab Results   Component Value Date    HGBA1C 5.70 (H) 12/28/2022     Lab Results   Component Value Date    HGBA1C 5.70 (H) 12/28/2022    HGBA1C 5.80 (H) 07/21/2016     Lab Results   Component Value Date    CREATININE 1.54 (H) 04/23/2024     No results found for: \"IRON\", \"TIBC\", \"FERRITIN\"    Result Review:  I have personally reviewed the results from the time of this admission to 12/31/2024 09:54 EST and agree with these findings:  [x]  Laboratory list / accordion  []  Microbiology  [x]  Radiology  [x]  EKG/Telemetry   [x]  Cardiology/Vascular   []  Pathology  [x]  Old records  []  Other:                 ReDS VOLUMETRIC ASSESSMENT:  UTO      Assessment & Plan      1. Pulmonary hypertension    2. HFrEF (heart failure with reduced ejection fraction)    3. Cough, unspecified type      Pulmonary hypertension-WHO-group-2/3.  Suspected from nonischemic cardiomyopathy and restrictive lung disease related to obesity.  Patient has been on Wegovy and did initially have weight loss although stopped Wegovy recently-last week patient encouraged to speak with primary care about restarting.  She is not interested in updating 2D TTE  Declined sleep medicine to evaluate JASON.  We will continue GDMT for heart failure    HFrEF--LVEF 36%--she appears euvolemic today.   Labs reviewed from 10/3/2024 creatinine 1.59, GFR 33, sodium 138, potassium 4.6    HR is not at goal, on max dose of metoprolol  She supplied proof of income for patient assistance program for Jardiance today.   We will continue " torsemide--she is generally taking 40mg BID, but will occasionally increase to 60mg BID.   We will continue GDMT as listed below--she is not interested in adjusting medications     3. Cough--She was in the ER in October--CXR showed infiltrate vs atelectasis was put on abx from ER--had f/u with Dr Diaz and was placed on abx.   Recommend f/u with PCP to ensure clearing after the 2 courses of abx.      NYHA stage C FC-III     Clinical status was assessed and has remained stable.  Treatment intent: curative   ReDS reading was not obtained today.  ReDS result: UTO       Today, Patient appears euvolemic. and with perfusion. The patient's hemodynamics are currently acceptable. HR is: normal and is not at goal. BP/MAP was reviewed and there is not room for medication up-titration.  The patient's clinical course has been impacted by CKD. LVEF: 36.     GDMT Assessment:       GDMT changes recommended today: No changes to medication therapy.      BB:   continue Metoprolol Succinate  100mg bid  Monitor heart rate.  Please call the [facility] for HR<50, dizziness, lightheadedness, syncope    A/A/A:     /66--I do not think BP would tolerate addition at this time, as well as patient is not interested in adjusting medications. She also previously was on lisinopril and it caused dizziness.   Occasional monitoring of Chem-7 is recommended; call for the development of a new cough or S/Sx angioedema    SGLT2-I:  continue Empagliflozin (Jardiance) 10 mg daily  Call for S/Sx genital mycotic infections  Do not take when inadequate oral intake, NPO, GI illness    MRA:  The patient is FC-NYHA Class III and MRA is indicated.   Previously on spironolactone--but caused dizziness and was discontinued      Diuretic Regimen:  -DIURETIC:   toresemide 60 mg two times daily  -Fluid restriction:   -requested  -2000 ml  -Patient has been asked to weigh daily and was provided with a printed diuretic strategy.  -Sodium restriction:   -1,500 mg Na  restriction was discussed.      Labs/Diagnostics/Referrals:    Labs -reviewed from 10/2024       Lifestyle Advice:   - call office if I gain more than 2 pounds in one day or 5 pounds in one week   - keep legs up while sitting   - use salt in moderation   - watch for swelling in feet, ankles and legs every day   - weigh myself daily   -call for concerning s/sx   -- activity or exercise based on tolerance encouraged     CODE STATUS: FULL              Return in about 6 months (around 6/30/2025).            Thank you for allowing me to participate in the care of your patient,       Tamela Borges, APRN  12/31/24  09:05 EST      **Pheobe Disclaimer:**  Much of this encounter note is an electronic transcription/translation of spoken language to printed text. The electronic translation of spoken language may permit erroneous, or at times, nonsensical words or phrases to be inadvertently transcribed. Although I have reviewed the note for such errors, some may still exist.    The information in this note was reviewed and updated during the visit to be as accurate as possible. As many patients have chronic medical problems, many of their individual problems and ongoing plans do not change significantly from visit to visit.  This information is correct and is consistent with my treatment plan as of today's visit.

## 2024-12-31 NOTE — PATIENT INSTRUCTIONS
Directions for when to call the clinic reviewed with the patient to include weight gain of 2 to 3 pounds in 24 hours, weight gain of 5 to 10 pounds within 7 days; worsening shortness of breath; worsening lower extremity edema or abdominal distention.    Continue current medications.     F/u with dr Campos

## 2025-01-08 RX ORDER — TORSEMIDE 20 MG/1
60 TABLET ORAL 2 TIMES DAILY
Qty: 180 TABLET | Refills: 1 | Status: SHIPPED | OUTPATIENT
Start: 2025-01-08

## 2025-02-12 ENCOUNTER — TELEPHONE (OUTPATIENT)
Dept: CARDIOLOGY | Facility: HOSPITAL | Age: 80
End: 2025-02-12
Payer: MEDICARE

## 2025-02-12 NOTE — TELEPHONE ENCOUNTER
I called CAROLIN Arvizu today regarding patient's enrollment into the patient assistance program for her Jardiance medication. Patient's application had not been processed up to this point, called to get an update.   Patient's application was finished processing when I called today. Patient is Approved through December 31, 2025.  They will ship out her order of Jardiance 10 mg today, she will receive it in 7 to 10 business days.    I called patient to make her aware. Patient verbalized understanding. No questions at this time.    Thanks,   Rica LEAL [facility]

## 2025-03-05 ENCOUNTER — OFFICE VISIT (OUTPATIENT)
Age: 80
End: 2025-03-05
Payer: MEDICARE

## 2025-03-05 VITALS
DIASTOLIC BLOOD PRESSURE: 66 MMHG | HEIGHT: 65 IN | SYSTOLIC BLOOD PRESSURE: 124 MMHG | BODY MASS INDEX: 44.2 KG/M2 | HEART RATE: 60 BPM

## 2025-03-05 DIAGNOSIS — I48.21 PERMANENT ATRIAL FIBRILLATION: Primary | ICD-10-CM

## 2025-03-05 DIAGNOSIS — I10 ESSENTIAL HYPERTENSION: ICD-10-CM

## 2025-03-05 DIAGNOSIS — I42.8 NICM (NONISCHEMIC CARDIOMYOPATHY): ICD-10-CM

## 2025-03-05 DIAGNOSIS — R40.0 DAYTIME SLEEPINESS: ICD-10-CM

## 2025-03-05 DIAGNOSIS — I50.20 HFREF (HEART FAILURE WITH REDUCED EJECTION FRACTION): ICD-10-CM

## 2025-03-05 NOTE — PROGRESS NOTES
ELECTROPHYSIOLOGY   Date of Office Visit: 2025  Patient Name: Unique Talavera  : 1945  Encounter Provider: Marty Amor PA-C  Electrophysiologist: Dr. Martines  CHIEF COMPLAINT / REASON FOR OFFICE VISIT     permanent AFIB, NICM, and HFrEF  8 month follow up    HISTORY OF PRESENT ILLNESS     This is a 79 y.o. year old female who presents to [facility] for a for a 6 month follow up.     She has permanent atrial fibrillation. She is reate controlled with toprol.      She has history of nonischemic cardiomyopathy diagnosed in .  At that time she was started on guideline medical therapy and on repeat echo in  her ejection fraction improved to 37%.    She follows up with the heart failure clinic and last saw them in December for her pulmonary hypertension. Her pulmonary HTN is thought to be due to NICM versus untreated sleep apnea.     Today the patient reports she has been doing fairly well but her main complaint is insomnia.  Over the last month or 2 she has not been able to sleep at night and is just always awake.  When she sits up she maybe will fall asleep for 30 or 40 minutes but as soon as she lays back down in her lazy boy she can go to sleep.    We talked today that I am suspicious that probably she is a little volume overloaded and potentially her undiagnosed sleep apnea is keeping her awake.  She mentions she would not wear oxygen during the daytime because it would be cumbersome with her going to the bathroom with her diuretics.    She does not check blood pressure at home, oxygen at home or weights.    She has not felt any racing heartbeats, chest pain or pressure.    Rivaroxaban 15 mg daily    PMHx: Nonischemic cardiopathy, hypertension, permanent atrial fibrillation, heart failure with reduced EF, arthritis, pulmonary hypertension group 2/3    PHYSICAL EXAMINATION     Vital Signs:  /66 (BP Location: Right arm, Patient Position: Sitting)    "Pulse 60   Ht 165.1 cm (65\")   BMI 44.20 kg/m²   Estimated body mass index is 44.2 kg/m² as calculated from the following:    Height as of this encounter: 165.1 cm (65\").    Weight as of 12/31/24: 120 kg (265 lb 9.6 oz).             Physical Exam  Constitutional:       Appearance: Normal appearance.   HENT:      Head: Normocephalic and atraumatic.   Cardiovascular:      Rate and Rhythm: Normal rate. Rhythm irregular.      Heart sounds: No murmur heard.  Pulmonary:      Effort: Pulmonary effort is normal.      Breath sounds: Normal breath sounds.   Musculoskeletal:      Right lower leg: No edema.      Left lower leg: No edema.   Skin:     General: Skin is warm and dry.   Neurological:      General: No focal deficit present.      Mental Status: She is alert and oriented to person, place, and time.          Cardiac Testing/Results     Cardiac Testing:   - Echo 8/2017: EF 36.9% with global hypokinesis. Moderately dilated LA. Mildly reduced RV function. Moderately dilated LA. RVSP 37 mmHG.     Result Review :  The following data was reviewed by: Marty Amor PA-C on 03/05/2025:       Lab Results   Component Value Date     04/23/2024     09/13/2023    K 3.8 04/23/2024    K 3.9 09/13/2023     04/23/2024     09/13/2023    CO2 26.7 04/23/2024    CO2 27.0 09/13/2023    BUN 27 (H) 04/23/2024    BUN 34 (H) 09/13/2023    CREATININE 1.54 (H) 04/23/2024    CREATININE 1.46 (H) 09/13/2023    EGFRIFNONA 56 (L) 02/19/2022    EGFRIFNONA 62 02/18/2022    GLUCOSE 101 (H) 04/23/2024    GLUCOSE 86 09/13/2023    CALCIUM 9.1 04/23/2024    CALCIUM 9.3 09/13/2023    ALBUMIN 4.0 04/23/2024    ALBUMIN 4.0 08/28/2023    AST 20 08/28/2023    AST 23 12/28/2022    ALT 11 08/28/2023    ALT 17 12/28/2022     Lab Results   Component Value Date    WBC 7.30 04/23/2024    WBC 7.99 08/30/2023    HGB 13.7 04/23/2024    HGB 12.7 08/30/2023    HCT 43.2 04/23/2024    HCT 38.2 08/30/2023    MCV 92.3 04/23/2024    MCV 90.7 " 08/30/2023     04/23/2024     08/30/2023     Lab Results   Component Value Date    PROBNP 2,283.0 (H) 04/23/2024    PROBNP 3,415.0 (H) 09/13/2023     (H) 10/03/2024     Lab Results   Component Value Date    TROPONINT 13 (H) 08/28/2023     Lab Results   Component Value Date    TSH 5.520 (H) 04/11/2016                 ECG 12 Lead    Date/Time: 3/5/2025 9:03 AM  Performed by: Marty Borja PA-C    Authorized by: Marty Borja PA-C  Comparison: compared with previous ECG from 6/4/2024  Rhythm: atrial fibrillation  Rate: normal  BPM: 68  T Waves: T waves normal  QRS axis: left  Comments: Qtc 425                ASSESSMENT & PLAN       Diagnoses and all orders for this visit:    1. Permanent atrial fibrillation (Primary)  She is in rate controlled atrial fibrillation today with heart rate in the 60s.  She does not track or monitor heart rates at all at home and did ask if this may be why she is staying up at night.  I do not think so as she has been in permanent A-fib for many years and at least when she is seen in the office after exerting herself she does not have any episodes of RVR.  We did talk about that if she did feel some palpitations or higher heart rates we can absolutely get a monitor to check this out.  Continue rivaroxaban 15 mg daily, denies bleeding complications  Continue metoprolol succinate 100 mg twice daily for rate control  2. NICM (nonischemic cardiomyopathy)  She follows intermittently with the heart failure clinic.  Her last echo was back in 2017 with an EF of 36.9%.  She has declined further echoes at this point  She is on medical therapy in the form of Jardiance and metoprolol.  She will take torsemide 40 mg daily with an additional dose as needed  Did have a long conversation today that I think some of her insomnia is related to volume overload.  She has difficulty falling asleep when she lays back at night.  Her lungs are clear today but I am sure that  this fluctuates day-to-day  3. Essential hypertension  Blood pressure today is controlled.  Has been controlled at other visits.  She does not check at home but will continue current meds  4. HFrEF (heart failure with reduced ejection fraction)  This has been longstanding and previous workup did not show any ischemic disease  Following with heart failure clinic for also group 2/3 pulmonary hypertension.  I do suspect that she has untreated sleep apnea that contributes to her insomnia.  She did express some interest maybe she would be willing to try oxygen again to use at night to help with her symptoms.  She is going to try taking a sleep aid for the next couple weeks and see how she feels.  At this point we will leave her to dictate care and when she is willing to maybe undergo a sleep study and using oxygen      Follow Up:  Return in about 6 months (around 9/5/2025) for Dr. Martines- Routine.  Patient was given instructions and counseling regarding her condition or for health maintenance advice. Please contact office if worsening symptoms or proceed to ER when appropriate.      Marty Amor PA-C  03/05/25  09:38 EST    MEDICATIONS         Discharge Medications            Accurate as of March 5, 2025  9:38 AM. If you have any questions, ask your nurse or doctor.                Continue These Medications        Instructions Start Date   empagliflozin 10 MG tablet tablet  Commonly known as: Jardiance   10 mg, Oral, Daily      metoprolol succinate  MG 24 hr tablet  Commonly known as: TOPROL-XL   100 mg, Oral, 2 Times Daily      potassium chloride ER 20 MEQ tablet controlled-release ER tablet  Commonly known as: K-TAB   20 mEq, Oral, 2 Times Daily      PRESERVISION AREDS 2 PO   1 dose, 2 Times Daily      torsemide 20 MG tablet  Commonly known as: DEMADEX   60 mg, Oral, 2 Times Daily      Xarelto 15 MG tablet  Generic drug: rivaroxaban   15 mg, Oral, Daily With Dinner                   **Phoebe Disclaimer:  This note was dictated using an electronic transcription. The electronic translation of spoken language may permit erroneous, or at times, nonsensical words or phrases to be inadvertently transcribed. Although I have reviewed the note for such errors, some may still exist.

## 2025-03-31 RX ORDER — POTASSIUM CHLORIDE 1500 MG/1
20 TABLET, EXTENDED RELEASE ORAL 2 TIMES DAILY
Qty: 60 TABLET | Refills: 2 | Status: SHIPPED | OUTPATIENT
Start: 2025-03-31

## 2025-05-02 RX ORDER — TORSEMIDE 20 MG/1
60 TABLET ORAL 2 TIMES DAILY
Qty: 180 TABLET | Refills: 1 | Status: SHIPPED | OUTPATIENT
Start: 2025-05-02

## 2025-07-01 ENCOUNTER — HOSPITAL ENCOUNTER (OUTPATIENT)
Dept: CARDIOLOGY | Facility: HOSPITAL | Age: 80
Discharge: HOME | End: 2025-07-01
Admitting: NURSE PRACTITIONER
Payer: MEDICARE

## 2025-07-01 ENCOUNTER — RESULTS FOLLOW-UP (OUTPATIENT)
Dept: CARDIOLOGY | Facility: HOSPITAL | Age: 80
End: 2025-07-01
Payer: MEDICARE

## 2025-07-01 VITALS
OXYGEN SATURATION: 96 % | HEIGHT: 65 IN | DIASTOLIC BLOOD PRESSURE: 70 MMHG | HEART RATE: 81 BPM | SYSTOLIC BLOOD PRESSURE: 100 MMHG | BODY MASS INDEX: 44.2 KG/M2

## 2025-07-01 DIAGNOSIS — I50.20 HFREF (HEART FAILURE WITH REDUCED EJECTION FRACTION): ICD-10-CM

## 2025-07-01 DIAGNOSIS — Z66 DNR (DO NOT RESUSCITATE): ICD-10-CM

## 2025-07-01 DIAGNOSIS — I27.20 PULMONARY HYPERTENSION: Primary | ICD-10-CM

## 2025-07-01 LAB
ANION GAP SERPL CALCULATED.3IONS-SCNC: 11.2 MMOL/L (ref 5–15)
BUN SERPL-MCNC: 34 MG/DL (ref 8–23)
BUN/CREAT SERPL: 21.5 (ref 7–25)
CALCIUM SPEC-SCNC: 8.6 MG/DL (ref 8.6–10.5)
CHLORIDE SERPL-SCNC: 105 MMOL/L (ref 98–107)
CO2 SERPL-SCNC: 23.8 MMOL/L (ref 22–29)
CREAT SERPL-MCNC: 1.58 MG/DL (ref 0.57–1)
EGFRCR SERPLBLD CKD-EPI 2021: 33 ML/MIN/1.73
GLUCOSE SERPL-MCNC: 79 MG/DL (ref 65–99)
POTASSIUM SERPL-SCNC: 4.3 MMOL/L (ref 3.5–5.2)
SODIUM SERPL-SCNC: 140 MMOL/L (ref 136–145)

## 2025-07-01 PROCEDURE — 94726 PLETHYSMOGRAPHY LUNG VOLUMES: CPT | Performed by: NURSE PRACTITIONER

## 2025-07-01 PROCEDURE — 80048 BASIC METABOLIC PNL TOTAL CA: CPT | Performed by: NURSE PRACTITIONER

## 2025-07-01 PROCEDURE — G0463 HOSPITAL OUTPT CLINIC VISIT: HCPCS

## 2025-07-01 RX ORDER — TORSEMIDE 20 MG/1
40 TABLET ORAL 2 TIMES DAILY
Start: 2025-07-01

## 2025-07-01 NOTE — ADDENDUM NOTE
Encounter addended by: Ailyn Acosta MA on: 7/1/2025 11:49 AM   Actions taken: Charge Capture section accepted

## 2025-07-01 NOTE — PATIENT INSTRUCTIONS
Directions for when to call the clinic reviewed with the patient to include weight gain of over 2 to 3 pounds in 24 hours, weight gain of over 5 pounds within 7 days; worsening shortness of breath; worsening lower extremity edema or abdominal distention.

## 2025-07-01 NOTE — PROGRESS NOTES
[facility]      Congestive Heart Failure  Pertinent negatives include no chest pain or shortness of breath.       1. Pulmonary hypertension  2. NICM/HFrEF    Subjective      Unique Bowen a 79 y.o. female who presents today for Pulmonary pretension and nonischemic cardiomyopathy.--LVEF 36%  Diagnosed in 2016 with severe left ventricular failure-LVEF around 15 % at that time.  LVEF did improved to the 30s.  Heart failure complicated by pulmonary hypertension and right ventricular dysfunction.  Also complicated by minimal adherence to GDMT and reluctance for GDMT.  Last 2D TTE in 2017 LVEF 36.9%.  Left ventricular cavity moderately dilated.  Right ventricular cavity mildly dilated, mildly reduced right ventricular systolic function.    Today she presents for follow-up.  Denies any new or worsening shortness of breath.  She reports stable weights although did not want to step on scale today.  She also did not want to discuss her weight at visit.  Denies any new or worsening leg edema.     Review of Systems - Review of Systems   Constitutional: Negative for weight gain and weight loss.   Cardiovascular:  Negative for chest pain, dyspnea on exertion, leg swelling, orthopnea, paroxysmal nocturnal dyspnea and syncope.   Respiratory:  Negative for cough and shortness of breath.    Gastrointestinal:  Negative for bloating.   Neurological:  Negative for dizziness.          Patient Active Problem List   Diagnosis    Permanent atrial fibrillation    Arthritis    Morbid obesity    NICM (nonischemic cardiomyopathy)    Vertigo    Daytime sleepiness    DNR (do not resuscitate)    Essential hypertension    Pulmonary hypertension    Sleep apnea-like behavior    HFrEF (heart failure with reduced ejection fraction)     family history includes Heart disease in her mother; Stroke in her father.   reports that she quit smoking about 53 years ago. Her smoking use included cigarettes. She has never  used smokeless tobacco. She reports that she does not currently use alcohol. She reports that she does not use drugs.  No Known Allergies  Physical Activity: Not on file          Current Outpatient Medications:     empagliflozin (Jardiance) 10 MG tablet tablet, Take 1 tablet by mouth Daily., Disp: 30 tablet, Rfl: 3    metoprolol succinate XL (TOPROL-XL) 100 MG 24 hr tablet, TAKE 1 TABLET BY MOUTH TWICE A DAY, Disp: 180 tablet, Rfl: 3    Multiple Vitamins-Minerals (PRESERVISION AREDS 2 PO), Take 1 dose by mouth 2 (Two) Times a Day., Disp: , Rfl:     potassium chloride ER (K-TAB) 20 MEQ tablet controlled-release ER tablet, TAKE 1 TABLET BY MOUTH 2 TIMES A DAY, Disp: 60 tablet, Rfl: 2    torsemide (DEMADEX) 20 MG tablet, Take 2 tablets by mouth 2 (Two) Times a Day. 3 tabs PRN, Disp: , Rfl:     rivaroxaban (XARELTO) 15 MG tablet, Take 1 tablet by mouth Daily With Dinner. Indications: Atrial Fibrillation, Disp: 42 tablet, Rfl: 0    Objective   Vital Sign Review:   Vitals:    07/01/25 0852   BP: 100/70   Pulse: 81   SpO2: 96%       Body mass index is 44.2 kg/m².  There were no vitals filed for this visit.    Physical Exam:  Constitutional:       Appearance: Normal appearance. Not in distress. Frail. Chronically ill-appearing.   Neck:      Vascular: No carotid bruit or JVD. JVD normal.   Pulmonary:      Effort: Pulmonary effort is normal.      Breath sounds: Normal breath sounds.   Cardiovascular:      PMI at left midclavicular line. Normal rate. Regular rhythm.   Pulses:     Intact distal pulses.   Edema:     Peripheral edema absent.   Abdominal:      Palpations: Abdomen is soft.      Tenderness: There is no abdominal tenderness.   Musculoskeletal:        Legs:  Skin:     General: Skin is warm and dry.   Neurological:      General: No focal deficit present.      Mental Status: Alert and oriented to person, place and time.   Psychiatric:         Behavior: Behavior is cooperative.          DATA REVIEWED:    EKG:      ---------------------------------------------------  ECHO:    Results for orders placed during the hospital encounter of 08/04/17    Adult Transthoracic Echo Complete With Contrast 08/04/2017  4:56 PM    Interpretation Summary  · Left ventricular systolic function is moderately decreased. Calculated EF = 36.9%. Estimated EF was in agreement with the calculated EF. There is left ventricular global hypokinesis noted. The left ventricular cavity is moderately dilated. Left ventricular diastolic was unable to be assessed.  · Right ventricular cavity is mildly dilated. Mildly reduced right ventricular systolic function noted.  · Left atrial cavity size is moderately dilated.  · Estimated right ventricular systolic pressure from tricuspid regurgitation is mildly elevated (35-45 mmHg). Calculated right ventricular systolic pressure from tricuspid regurgitation is 37 mmHg.          -----------------------------------------------------  RHC/LHC    Results for orders placed during the hospital encounter of 04/11/16    Cardiac catheterization    Narrative  This study has been resulted via dictation. Please see dictated result for more details.      ---------------------------------------------------------------------------    CT:   No radiology results for the last 30 days.        --------------------------------------------------------------------------------------------------------------    Laboratory evaluations:  Renal Function: CrCl cannot be calculated (Patient's most recent lab result is older than the maximum 30 days allowed.).    Lab Results   Component Value Date    GLUCOSE 101 (H) 04/23/2024    BUN 27 (H) 04/23/2024    CREATININE 1.54 (H) 04/23/2024    EGFRIFNONA 56 (L) 02/19/2022    BCR 17.5 04/23/2024    K 3.8 04/23/2024    CO2 26.7 04/23/2024    CALCIUM 9.1 04/23/2024    ALBUMIN 4.0 04/23/2024    AST 20 08/28/2023    ALT 11 08/28/2023     Lab Results   Component Value Date    WBC 7.30 04/23/2024     "HGB 13.7 04/23/2024    HCT 43.2 04/23/2024    MCV 92.3 04/23/2024     04/23/2024     Lab Results   Component Value Date    HGBA1C 5.70 (H) 12/28/2022     Lab Results   Component Value Date    HGBA1C 5.70 (H) 12/28/2022    HGBA1C 5.80 (H) 07/21/2016     Lab Results   Component Value Date    CREATININE 1.54 (H) 04/23/2024     No results found for: \"IRON\", \"TIBC\", \"FERRITIN\"    Result Review:  I have personally reviewed the results from the time of this admission to 7/1/2025 09:33 EDT and agree with these findings:  [x]  Laboratory list / accordion  []  Microbiology  [x]  Radiology  [x]  EKG/Telemetry   [x]  Cardiology/Vascular   []  Pathology  [x]  Old records  []  Other:                 ReDS VOLUMETRIC ASSESSMENT:  ReDs Vest    Performed by: Tamela Borges APRN  Authorized by: Tamela Borges APRN    ReDS value:  34  ReDS Value Description:  25-35 (green) = Optimal Volume Status          Assessment & Plan      1. Pulmonary hypertension    2. HFrEF (heart failure with reduced ejection fraction)    3. DNR (do not resuscitate)        Pulmonary hypertension-WHO-group-2/3.  Suspected from nonischemic cardiomyopathy and restrictive lung disease related to obesity.  Patient has been on Wegovy and did initially have weight loss although stopped Wegovy recently-last week patient encouraged to speak with primary care about restarting.  She is not interested in updating 2D TTE  Declined sleep medicine to evaluate JASON.  We will continue GDMT for heart failure    HFrEF--LVEF 36%--she appears euvolemic today.   Obesity complicates volume assessment, ReDs is normal, pt is obese and lower extremities are large, but no pitting edema  Labs reviewed from 10/3/2024 creatinine 1.59, GFR 33, sodium 138, potassium 4.6--will check BMP today  HR is not at goal, on max dose of metoprolol  Discussed with her at her next visit she will need to supply income proof to continue jardiance patient assistance  We will continue " torsemide--she is generally taking 40mg BID, but will occasionally increase to 60mg BID.   We will continue GDMT as listed below--she is not interested in adjusting medications     3. DNR/DNI--Advance Care Planning   ACP discussion was held with the patient during this visit. Patient has an advance directive in EMR which is still valid.        NYHA stage C FC-III     Clinical status was assessed and has remained stable.  Treatment intent: curative   ReDS reading was not obtained today.  ReDS result: 34%       Today, Patient appears euvolemic. and with perfusion. The patient's hemodynamics are currently acceptable. HR is: normal and is not at goal. BP/MAP was reviewed and there is not room for medication up-titration.  The patient's clinical course has been impacted by CKD. LVEF: 36.     GDMT Assessment:       GDMT changes recommended today: No changes to medication therapy.      BB:   continue Metoprolol Succinate 100mg bid  Monitor heart rate.  Please call the [facility] for HR<50, dizziness, lightheadedness, syncope    A/A/A:     /70--I do not think BP would tolerate addition at this time, as well as patient is not interested in adjusting medications. She also previously was on lisinopril and it caused dizziness.     SGLT2-I:  continue Empagliflozin (Jardiance) 10 mg daily  Call for S/Sx genital mycotic infections  Do not take when inadequate oral intake, NPO, GI illness    MRA:  The patient is FC-NYHA Class III and MRA is indicated.   Previously on spironolactone--but caused dizziness and was discontinued      Diuretic Regimen:  -DIURETIC:   toresemide 40 mg two times daily--can increase to 60mg as needed  -Fluid restriction:   -requested  -2000 ml  -Patient has been asked to weigh daily and was provided with a printed diuretic strategy.  -Sodium restriction:   -1,500 mg Na restriction was discussed.      Labs/Diagnostics/Referrals:    Labs -reviewed from 10/2024       Lifestyle Advice:   - call office if I gain  more than 2 pounds in one day or 5 pounds in one week   - keep legs up while sitting   - use salt in moderation   - watch for swelling in feet, ankles and legs every day   - weigh myself daily   -call for concerning s/sx   -- activity or exercise based on tolerance encouraged     CODE STATUS: FULL              Return in about 6 months (around 1/1/2026).            Thank you for allowing me to participate in the care of your patient,       Tamela Borges, APRN  07/01/25  09:05 EST      **Phoebe Disclaimer:**  Much of this encounter note is an electronic transcription/translation of spoken language to printed text. The electronic translation of spoken language may permit erroneous, or at times, nonsensical words or phrases to be inadvertently transcribed. Although I have reviewed the note for such errors, some may still exist.    The information in this note was reviewed and updated during the visit to be as accurate as possible. As many patients have chronic medical problems, many of their individual problems and ongoing plans do not change significantly from visit to visit.  This information is correct and is consistent with my treatment plan as of today's visit.

## 2025-07-01 NOTE — LETTER
July 1, 2025     Lloyd Martines MD  [address]  [address]  [place] [id number]    Patient: Unique Talavera   YOB: 1945   Date of Visit: 7/1/2025     Dear Lloyd Martines MD:       Thank you for referring Unique Talavera to me for evaluation. Below are the relevant portions of my assessment and plan of care.    If you have questions, please do not hesitate to call me. I look forward to following Unique along with you.         Sincerely,        SADAF Andino        CC: MD Jony Bosch, SADAF Clinton  07/01/25 0936  Signed    [facility]      Congestive Heart Failure  Pertinent negatives include no chest pain or shortness of breath.       1. Pulmonary hypertension  2. NICM/HFrEF    Subjective     Unique Talaverais a 79 y.o. female who presents today for Pulmonary pretension and nonischemic cardiomyopathy.--LVEF 36%  Diagnosed in 2016 with severe left ventricular failure-LVEF around 15 % at that time.  LVEF did improved to the 30s.  Heart failure complicated by pulmonary hypertension and right ventricular dysfunction.  Also complicated by minimal adherence to GDMT and reluctance for GDMT.  Last 2D TTE in 2017 LVEF 36.9%.  Left ventricular cavity moderately dilated.  Right ventricular cavity mildly dilated, mildly reduced right ventricular systolic function.    Today she presents for follow-up.  Denies any new or worsening shortness of breath.  She reports stable weights although did not want to step on scale today.  She also did not want to discuss her weight at visit.  Denies any new or worsening leg edema.     Review of Systems - Review of Systems   Constitutional: Negative for weight gain and weight loss.   Cardiovascular:  Negative for chest pain, dyspnea on exertion, leg swelling, orthopnea, paroxysmal nocturnal dyspnea and syncope.   Respiratory:  Negative for cough and shortness of breath.    Gastrointestinal:  Negative for bloating.    Neurological:  Negative for dizziness.          Patient Active Problem List   Diagnosis   • Permanent atrial fibrillation   • Arthritis   • Morbid obesity   • NICM (nonischemic cardiomyopathy)   • Vertigo   • Daytime sleepiness   • DNR (do not resuscitate)   • Essential hypertension   • Pulmonary hypertension   • Sleep apnea-like behavior   • HFrEF (heart failure with reduced ejection fraction)     family history includes Heart disease in her mother; Stroke in her father.   reports that she quit smoking about 53 years ago. Her smoking use included cigarettes. She has never used smokeless tobacco. She reports that she does not currently use alcohol. She reports that she does not use drugs.  No Known Allergies  Physical Activity: Not on file          Current Outpatient Medications:   •  empagliflozin (Jardiance) 10 MG tablet tablet, Take 1 tablet by mouth Daily., Disp: 30 tablet, Rfl: 3  •  metoprolol succinate XL (TOPROL-XL) 100 MG 24 hr tablet, TAKE 1 TABLET BY MOUTH TWICE A DAY, Disp: 180 tablet, Rfl: 3  •  Multiple Vitamins-Minerals (PRESERVISION AREDS 2 PO), Take 1 dose by mouth 2 (Two) Times a Day., Disp: , Rfl:   •  potassium chloride ER (K-TAB) 20 MEQ tablet controlled-release ER tablet, TAKE 1 TABLET BY MOUTH 2 TIMES A DAY, Disp: 60 tablet, Rfl: 2  •  torsemide (DEMADEX) 20 MG tablet, Take 2 tablets by mouth 2 (Two) Times a Day. 3 tabs PRN, Disp: , Rfl:   •  rivaroxaban (XARELTO) 15 MG tablet, Take 1 tablet by mouth Daily With Dinner. Indications: Atrial Fibrillation, Disp: 42 tablet, Rfl: 0    Objective  Vital Sign Review:   Vitals:    07/01/25 0852   BP: 100/70   Pulse: 81   SpO2: 96%       Body mass index is 44.2 kg/m².  There were no vitals filed for this visit.    Physical Exam:  Constitutional:       Appearance: Normal appearance. Not in distress. Frail. Chronically ill-appearing.   Neck:      Vascular: No carotid bruit or JVD. JVD normal.   Pulmonary:      Effort: Pulmonary effort is normal.       Breath sounds: Normal breath sounds.   Cardiovascular:      PMI at left midclavicular line. Normal rate. Regular rhythm.   Pulses:     Intact distal pulses.   Edema:     Peripheral edema absent.   Abdominal:      Palpations: Abdomen is soft.      Tenderness: There is no abdominal tenderness.   Musculoskeletal:        Legs:  Skin:     General: Skin is warm and dry.   Neurological:      General: No focal deficit present.      Mental Status: Alert and oriented to person, place and time.   Psychiatric:         Behavior: Behavior is cooperative.          DATA REVIEWED:   EKG:      ---------------------------------------------------  ECHO:    Results for orders placed during the hospital encounter of 08/04/17    Adult Transthoracic Echo Complete With Contrast 08/04/2017  4:56 PM    Interpretation Summary  · Left ventricular systolic function is moderately decreased. Calculated EF = 36.9%. Estimated EF was in agreement with the calculated EF. There is left ventricular global hypokinesis noted. The left ventricular cavity is moderately dilated. Left ventricular diastolic was unable to be assessed.  · Right ventricular cavity is mildly dilated. Mildly reduced right ventricular systolic function noted.  · Left atrial cavity size is moderately dilated.  · Estimated right ventricular systolic pressure from tricuspid regurgitation is mildly elevated (35-45 mmHg). Calculated right ventricular systolic pressure from tricuspid regurgitation is 37 mmHg.          -----------------------------------------------------  RHC/LHC    Results for orders placed during the hospital encounter of 04/11/16    Cardiac catheterization    Narrative  This study has been resulted via dictation. Please see dictated result for more details.      ---------------------------------------------------------------------------    CT:   No radiology results for the last 30  "days.        --------------------------------------------------------------------------------------------------------------    Laboratory evaluations:  Renal Function: CrCl cannot be calculated (Patient's most recent lab result is older than the maximum 30 days allowed.).    Lab Results   Component Value Date    GLUCOSE 101 (H) 04/23/2024    BUN 27 (H) 04/23/2024    CREATININE 1.54 (H) 04/23/2024    EGFRIFNONA 56 (L) 02/19/2022    BCR 17.5 04/23/2024    K 3.8 04/23/2024    CO2 26.7 04/23/2024    CALCIUM 9.1 04/23/2024    ALBUMIN 4.0 04/23/2024    AST 20 08/28/2023    ALT 11 08/28/2023     Lab Results   Component Value Date    WBC 7.30 04/23/2024    HGB 13.7 04/23/2024    HCT 43.2 04/23/2024    MCV 92.3 04/23/2024     04/23/2024     Lab Results   Component Value Date    HGBA1C 5.70 (H) 12/28/2022     Lab Results   Component Value Date    HGBA1C 5.70 (H) 12/28/2022    HGBA1C 5.80 (H) 07/21/2016     Lab Results   Component Value Date    CREATININE 1.54 (H) 04/23/2024     No results found for: \"IRON\", \"TIBC\", \"FERRITIN\"    Result Review:  I have personally reviewed the results from the time of this admission to 7/1/2025 09:33 EDT and agree with these findings:  [x]  Laboratory list / accordion  []  Microbiology  [x]  Radiology  [x]  EKG/Telemetry   [x]  Cardiology/Vascular   []  Pathology  [x]  Old records  []  Other:                 ReDS VOLUMETRIC ASSESSMENT:  ReDs Vest    Performed by: aTmela Borges APRN  Authorized by: Tamela Borges APRN    ReDS value:  34  ReDS Value Description:  25-35 (green) = Optimal Volume Status          Assessment & Plan     1. Pulmonary hypertension    2. HFrEF (heart failure with reduced ejection fraction)    3. DNR (do not resuscitate)        Pulmonary hypertension-WHO-group-2/3.  Suspected from nonischemic cardiomyopathy and restrictive lung disease related to obesity.  Patient has been on Wegovy and did initially have weight loss although stopped Wegovy " recently-last week patient encouraged to speak with primary care about restarting.  She is not interested in updating 2D TTE  Declined sleep medicine to evaluate JASON.  We will continue GDMT for heart failure    HFrEF--LVEF 36%--she appears euvolemic today.   Obesity complicates volume assessment, ReDs is normal, pt is obese and lower extremities are large, but no pitting edema  Labs reviewed from 10/3/2024 creatinine 1.59, GFR 33, sodium 138, potassium 4.6--will check BMP today  HR is not at goal, on max dose of metoprolol  Discussed with her at her next visit she will need to supply income proof to continue jardiance patient assistance  We will continue torsemide--she is generally taking 40mg BID, but will occasionally increase to 60mg BID.   We will continue GDMT as listed below--she is not interested in adjusting medications     3. DNR/DNI--Advance Care Planning  ACP discussion was held with the patient during this visit. Patient has an advance directive in EMR which is still valid.        NYHA stage C FC-III     Clinical status was assessed and has remained stable.  Treatment intent: curative   ReDS reading was not obtained today.  ReDS result: 34%       Today, Patient appears euvolemic. and with perfusion. The patient's hemodynamics are currently acceptable. HR is: normal and is not at goal. BP/MAP was reviewed and there is not room for medication up-titration.  The patient's clinical course has been impacted by CKD. LVEF: 36.     GDMT Assessment:       GDMT changes recommended today: No changes to medication therapy.      BB:   continue Metoprolol Succinate 100mg bid  Monitor heart rate.  Please call the [facility] for HR<50, dizziness, lightheadedness, syncope    A/A/A:     /70--I do not think BP would tolerate addition at this time, as well as patient is not interested in adjusting medications. She also previously was on lisinopril and it caused dizziness.     SGLT2-I:  continue Empagliflozin (Jardiance) 10  mg daily  Call for S/Sx genital mycotic infections  Do not take when inadequate oral intake, NPO, GI illness    MRA:  The patient is FC-NYHA Class III and MRA is indicated.   Previously on spironolactone--but caused dizziness and was discontinued      Diuretic Regimen:  -DIURETIC:   toresemide 40 mg two times daily--can increase to 60mg as needed  -Fluid restriction:   -requested  -2000 ml  -Patient has been asked to weigh daily and was provided with a printed diuretic strategy.  -Sodium restriction:   -1,500 mg Na restriction was discussed.      Labs/Diagnostics/Referrals:    Labs -reviewed from 10/2024       Lifestyle Advice:   - call office if I gain more than 2 pounds in one day or 5 pounds in one week   - keep legs up while sitting   - use salt in moderation   - watch for swelling in feet, ankles and legs every day   - weigh myself daily   -call for concerning s/sx   -- activity or exercise based on tolerance encouraged     CODE STATUS: FULL              Return in about 6 months (around 1/1/2026).            Thank you for allowing me to participate in the care of your patient,       Tamela Borges, APRN  07/01/25  09:05 EST      **Phoebe Disclaimer:**  Much of this encounter note is an electronic transcription/translation of spoken language to printed text. The electronic translation of spoken language may permit erroneous, or at times, nonsensical words or phrases to be inadvertently transcribed. Although I have reviewed the note for such errors, some may still exist.    The information in this note was reviewed and updated during the visit to be as accurate as possible. As many patients have chronic medical problems, many of their individual problems and ongoing plans do not change significantly from visit to visit.  This information is correct and is consistent with my treatment plan as of today's visit.

## 2025-07-02 NOTE — TELEPHONE ENCOUNTER
Called and informed patient of lab results per Magalie TALAVERA    Patient verbalized understanding

## 2025-07-08 RX ORDER — POTASSIUM CHLORIDE 1500 MG/1
20 TABLET, EXTENDED RELEASE ORAL 2 TIMES DAILY
Qty: 60 TABLET | Refills: 2 | Status: SHIPPED | OUTPATIENT
Start: 2025-07-08

## 2025-07-23 ENCOUNTER — TELEPHONE (OUTPATIENT)
Dept: CARDIOLOGY | Age: 80
End: 2025-07-23